# Patient Record
Sex: MALE | Race: WHITE | Employment: OTHER | URBAN - METROPOLITAN AREA
[De-identification: names, ages, dates, MRNs, and addresses within clinical notes are randomized per-mention and may not be internally consistent; named-entity substitution may affect disease eponyms.]

---

## 2017-01-12 ENCOUNTER — TRANSFERRED RECORDS (OUTPATIENT)
Dept: HEALTH INFORMATION MANAGEMENT | Facility: CLINIC | Age: 67
End: 2017-01-12

## 2017-01-18 DIAGNOSIS — Z01.818 PREOPERATIVE EXAMINATION: Primary | ICD-10-CM

## 2017-02-01 NOTE — PATIENT INSTRUCTIONS
Before Your Surgery  1. Hold ibuprofen, krill oil, aspirin until after surgery.  2. Take all your usual medications the day of surgery with a small sip of water except your metformin. Your metformin can be resumed in the evening after surgery.         Call your surgeon if there is any change in your health. This includes signs of a cold or flu (such as a sore throat, runny nose, cough, rash or fever).    Do not smoke, drink alcohol or take over the counter medicine (unless your surgeon or primary care doctor tells you to) for the 24 hours before and after surgery.    If you take prescribed drugs: Follow your doctor s orders about which medicines to take and which to stop until after surgery.    Eating and drinking prior to surgery: follow the instructions from your surgeon    Take a shower or bath the night before surgery. Use the soap your surgeon gave you to gently clean your skin. If you do not have soap from your surgeon, use your regular soap. Do not shave or scrub the surgery site.  Wear clean pajamas and have clean sheets on your bed.

## 2017-02-01 NOTE — PROGRESS NOTES
[facility]  [address]  [place] [address]  [phone]  Dept: [phone]    PRE-OP EVALUATION:  Today's date: 2017    Dino Daniels (: 1950) presents for pre-operative evaluation assessment as requested by Dr. Zimmerman.  He requires evaluation and anesthesia risk assessment prior to undergoing surgery/procedure for treatment of chronic ankle pain.  Proposed procedure:  Ankle fusion    Date of Surgery/ Procedure: 2/10/17  Time of Surgery/ Procedure: 09:30  Hospital/Surgical Facility: [facility]  Fax number for surgical facility: [phone]  Primary Physician: Abel Fernandez  Type of Anesthesia Anticipated: to be determined    Patient has a Health Care Directive or Living Will:  YES will bring it.     1. NO - Do you have a history of heart attack, stroke, stent, bypass or surgery on an artery in the head, neck, heart or legs?  2. NO - Do you ever have any pain or discomfort in your chest?  3. NO - Do you have a history of  Heart Failure?  4. NO - Are you troubled by shortness of breath when: walking on the level, up a slight hill or at night?  5. NO - Do you currently have a cold, bronchitis or other respiratory infection?  6. NO - Do you have a cough, shortness of breath or wheezing?  7. NO - Do you sometimes get pains in the calves of your legs when you walk?  8. NO - Do you or anyone in your family have previous history of blood clots?  9. NO - Do you or does anyone in your family have a serious bleeding problem such as prolonged bleeding following surgeries or cuts?  10. NO - Have you ever had problems with anemia or been told to take iron pills?  11. NO - Have you had any abnormal blood loss such as black, tarry or bloody stools, or abnormal vaginal bleeding?  12. NO - Have you ever had a blood transfusion?  13. NO - Have you or any of your relatives ever had problems with anesthesia?  14. NO - Do you have sleep apnea, excessive  snoring or daytime drowsiness?  15. NO - Do you have any prosthetic heart valves?  16. NO - Do you have prosthetic joints?  17. NO - Is there any chance that you may be pregnant?      HPI:                                                      Brief HPI related to upcoming procedure: Mr. Daniels is a 66 year old relatively healthy male who has had repeated ankle injuries throughout his life due to sports in his youth. He has been living with chronic pain in the left ankle for at least the past 10 years. It has become bad enough that he now desires left ankle fusion. He plans to undergo surgery on 2/10/17.     See problem list for active medical problems.  Problems all longstanding and stable, except as noted/documented.  See ROS for pertinent symptoms related to these conditions.                                                                                            MEDICAL HISTORY:                                                      Patient Active Problem List    Diagnosis Date Noted     Major depressive disorder, single episode in full remission (H) 12/03/2015     Priority: Medium     Type 2 diabetes, HbA1c goal < 7% (H) 07/16/2012     Priority: Medium     HYPERLIPIDEMIA LDL GOAL <100 10/31/2010     Priority: Medium     Pain in joint, ankle and foot 10/26/2007     Priority: Medium      Past Medical History   Diagnosis Date     Other and unspecified hyperlipidemia      abstracted 7/17/02.     DEPRESSIVE DISORDER NEC 10/9/2006     resolved in 2009     Type II or unspecified type diabetes mellitus without mention of complication, not stated as uncontrolled      Hypertension      Arthritis      knees, left ankle and right thumb     Bladder stone      Past Surgical History   Procedure Laterality Date     [place] teeth[       Vasectomy       Laser holmium lithotripsy bladder  6/22/2012     Procedure: LASER HOLMIUM LITHOTRIPSY BLADDER;  Cystoscopy with Removal of Bladder Stone with Holmium Laser;  Surgeon: Reza  Chaz Saunders MD;  Location: [facility] OR     Current Outpatient Prescriptions   Medication Sig Dispense Refill     citalopram (CELEXA) 20 MG tablet Take 1 tablet (20 mg) by mouth daily 90 tablet 3     citalopram (CELEXA) 10 MG tablet Take 1 tablet (10 mg) by mouth daily 90 tablet 3     blood glucose monitoring (NO BRAND SPECIFIED) test strip Pt. Is testing 3 times daily ### DO NOT FILL NOW.  Please update patient's profile to reflect additional refills.  #### 300 each 3     lisinopril (PRINIVIL/ZESTRIL) 20 MG tablet Take 1 tablet (20 mg) by mouth daily 90 tablet 3     atorvastatin (LIPITOR) 40 MG tablet Take 1 tablet (40 mg) by mouth daily 90 tablet 3     tamsulosin (FLOMAX) 0.4 MG capsule Take 1 capsule (0.4 mg) by mouth daily 90 capsule 3     finasteride (PROSCAR) 5 MG tablet Take 1 tablet (5 mg) by mouth daily 90 tablet 3     metFORMIN (GLUCOPHAGE) 1000 MG tablet one po bid 180 tablet 3     thin (NO BRAND SPECIFIED) lancets 1 each 3 times daily 3 Box 3     zolpidem (AMBIEN) 10 MG tablet Take 1 tablet (10 mg) by mouth nightly as needed for sleep 10 tablet 5     ASPIRIN PO Take 325 mg by mouth daily       Blood Glucose Monitoring Suppl (ASCENSIA BREEZE MONITOR) KIT 1 each daily. 1 kit 0     KRILL OIL OR None Entered       MULTIPLE VITAMINS CAPS   OR 1 capsule qd        OTC products: NSAIDS    Allergies   Allergen Reactions     No Known Drug Allergies       Latex Allergy: NO    Social History   Substance Use Topics     Smoking status: Never Smoker      Smokeless tobacco: Never Used     Alcohol Use: 0.0 oz/week     0 Standard drinks or equivalent per week      Comment: rarely     History   Drug Use No       REVIEW OF SYSTEMS:                                                    C: NEGATIVE for fever, chills, change in weight  I: NEGATIVE for worrisome rashes, moles or lesions  E: NEGATIVE for vision changes or irritation  E/M: NEGATIVE for ear, mouth and throat problems  R: NEGATIVE for significant cough or SOB  CV:  NEGATIVE for chest pain, palpitations or peripheral edema  GI: NEGATIVE for nausea, abdominal pain, heartburn, or change in bowel habits  : NEGATIVE for frequency, dysuria, or hematuria  M: NEGATIVE for significant arthralgias or myalgia other than left ankle.   N: NEGATIVE for weakness, dizziness or paresthesias  E: NEGATIVE for temperature intolerance, skin/hair changes  H: NEGATIVE for bleeding problems  P: NEGATIVE for changes in mood or affect    EXAM:                                                    /87 mmHg  Pulse 82  Temp(Src) 98.5  F (36.9  C)  Resp 16  Wt 231 lb (104.781 kg)  SpO2 99%    GENERAL APPEARANCE: healthy, alert and no distress     EYES: EOMI, - PERRL     HENT: ear canals and TM's normal and nose and mouth without ulcers or lesions     NECK: no adenopathy, no asymmetry, masses, or scars. No carotid bruits.      RESP: lungs clear to auscultation - no rales, rhonchi or wheezes     CV: regular rates and rhythm, normal S1 S2, no S3 or S4 and no murmur, click or rub.      ABDOMEN:  soft, nontender, no HSM or masses and bowel sounds normal     MS: extremities normal with exception of minor left ankle swelling.      SKIN: no suspicious lesions or rashes     NEURO: Normal strength and tone, sensory exam grossly normal, mentation intact and speech normal     PSYCH: mentation appears normal. and affect normal/bright     LYMPHATICS: No axillary, cervical, inguinal, or supraclavicular nodes    DIAGNOSTICS:                                                    EKG 2/6/17: appears normal, NSR, normal axis, normal intervals, no acute ST/T changes c/w ischemia, no LVH by voltage criteria, unchanged from previous tracings    Recent Labs   Lab Test  12/14/16   0831  09/21/16   0827   HGB  15.2  16.0   PLT  186  168   NA  140  136   POTASSIUM  4.5  4.4   CR  0.97  1.07   A1C  6.2*  7.2*   Labs 2/6/17:  Hgb 14.8  INR 1.00  PTT 27.0    IMPRESSION:                                                    Reason  for surgery/procedure: Chronic ankle pain, arthritis  Diagnosis/reason for consult: pre-operative clearance    The proposed surgical procedure is considered INTERMEDIATE risk.    REVISED CARDIAC RISK INDEX  The patient has the following serious cardiovascular risks for perioperative complications such as (MI, PE, VFib and 3  AV Block):  No serious cardiac risks  INTERPRETATION: 0 risks: Class I (very low risk - 0.4% complication rate)    The patient has the following additional risks for perioperative complications:  No identified additional risks    RECOMMENDATIONS:                                                      --Patient is to take all scheduled medications on the day of surgery EXCEPT for modifications listed below.    Diabetes Medication Use  -----Hold usual oral diabetic meds (e.g. Metformin, Actos, Glipizide) while NPO.     Anticoagulant or Antiplatelet Medication Use  ASPIRIN: Discontinue ASA 7-10 days prior to procedure to reduce bleeding risk.  It should be resumed post-operatively. (states he last took this around 2/1/17)  NSAIDS: Ibuprofen (Motrin): Hold starting now  Krill oil:   Hold starting now      APPROVAL GIVEN to proceed with proposed procedure, without further diagnostic evaluation       Signed Electronically by: Lawanda Tamayo PA-C    Copy of this evaluation report is provided to requesting physician.    [place] Preop Guidelines

## 2017-02-06 ENCOUNTER — HOSPITAL ENCOUNTER (OUTPATIENT)
Dept: LAB | Facility: CLINIC | Age: 67
Discharge: HOME | End: 2017-02-06
Attending: INTERNAL MEDICINE | Admitting: INTERNAL MEDICINE
Payer: MEDICARE

## 2017-02-06 ENCOUNTER — OFFICE VISIT (OUTPATIENT)
Dept: OTHER | Facility: CLINIC | Age: 67
End: 2017-02-06
Attending: INTERNAL MEDICINE
Payer: MEDICARE

## 2017-02-06 VITALS
DIASTOLIC BLOOD PRESSURE: 87 MMHG | BODY MASS INDEX: 33.86 KG/M2 | WEIGHT: 231 LBS | OXYGEN SATURATION: 99 % | TEMPERATURE: 98.5 F | SYSTOLIC BLOOD PRESSURE: 129 MMHG | HEART RATE: 82 BPM | RESPIRATION RATE: 16 BRPM

## 2017-02-06 DIAGNOSIS — Z01.818 PREOP GENERAL PHYSICAL EXAM: Primary | ICD-10-CM

## 2017-02-06 DIAGNOSIS — Z01.818 PREOPERATIVE EXAMINATION: ICD-10-CM

## 2017-02-06 DIAGNOSIS — Z01.810 PRE-OPERATIVE CARDIOVASCULAR EXAMINATION: Primary | ICD-10-CM

## 2017-02-06 LAB
APTT PPP: 27 SEC (ref 22–37)
HGB BLD-MCNC: 14.8 G/DL (ref 13.3–17.7)
INR PPP: 1 (ref 0.86–1.14)

## 2017-02-06 PROCEDURE — 36415 COLL VENOUS BLD VENIPUNCTURE: CPT | Performed by: PHYSICIAN ASSISTANT

## 2017-02-06 PROCEDURE — 99212 OFFICE O/P EST SF 10 MIN: CPT | Mod: 25

## 2017-02-06 PROCEDURE — 85018 HEMOGLOBIN: CPT | Performed by: PHYSICIAN ASSISTANT

## 2017-02-06 PROCEDURE — 99211 OFF/OP EST MAY X REQ PHY/QHP: CPT

## 2017-02-06 PROCEDURE — 99214 OFFICE O/P EST MOD 30 MIN: CPT | Mod: ZP | Performed by: PHYSICIAN ASSISTANT

## 2017-02-06 PROCEDURE — 85610 PROTHROMBIN TIME: CPT | Performed by: PHYSICIAN ASSISTANT

## 2017-02-06 PROCEDURE — 85730 THROMBOPLASTIN TIME PARTIAL: CPT | Performed by: PHYSICIAN ASSISTANT

## 2017-02-06 NOTE — NURSING NOTE
"Chief Complaint   Patient presents with     RECHECK     Pre-op for ankle fusion on 02/10/17. Pt will have labs done prior to appt.       Initial /87 mmHg  Pulse 82  Temp(Src) 98.5  F (36.9  C)  Resp 16  Wt 231 lb (104.781 kg)  SpO2 99% Estimated body mass index is 33.86 kg/(m^2) as calculated from the following:    Height as of 11/13/15: 5' 9.25\" (1.759 m).    Weight as of this encounter: 231 lb (104.781 kg).  Medication Reconciliation: complete   Blood pressure - regular cuff, right arm, sitting.     Face to face nursing time: 8 minutes    Emilee Dupont MA        "

## 2017-02-06 NOTE — MR AVS SNAPSHOT
After Visit Summary   2/6/2017    Dino Daniels    MRN: [id number]           Patient Information     Date Of Birth          1950        Visit Information        Provider Department      2/6/2017 11:15 AM Lawanda Tamayo PA-C [facility]      Today's Diagnoses     Preop general physical exam    -  1       Care Instructions      Before Your Surgery  1. Hold ibuprofen, krill oil, aspirin until after surgery.  2. Take all your usual medications the day of surgery with a small sip of water except your metformin. Your metformin can be resumed in the evening after surgery.         Call your surgeon if there is any change in your health. This includes signs of a cold or flu (such as a sore throat, runny nose, cough, rash or fever).    Do not smoke, drink alcohol or take over the counter medicine (unless your surgeon or primary care doctor tells you to) for the 24 hours before and after surgery.    If you take prescribed drugs: Follow your doctor s orders about which medicines to take and which to stop until after surgery.    Eating and drinking prior to surgery: follow the instructions from your surgeon    Take a shower or bath the night before surgery. Use the soap your surgeon gave you to gently clean your skin. If you do not have soap from your surgeon, use your regular soap. Do not shave or scrub the surgery site.  Wear clean pajamas and have clean sheets on your bed.         Follow-ups after your visit        Who to contact     If you have questions or need follow up information about today's clinic visit or your schedule please contact [facility] directly at [phone].  Normal or non-critical lab and imaging results will be communicated to you by MyChart, letter or phone within 4 business days after the clinic has received the results. If you do not hear from us within 7 days, please contact the  clinic through [employer] or phone. If you have a critical or abnormal lab result, we will notify you by phone as soon as possible.  Submit refill requests through [employer] or call your pharmacy and they will forward the refill request to us. Please allow 3 business days for your refill to be completed.          Additional Information About Your Visit        [employer] Information     [employer] gives you secure access to your electronic health record. If you see a primary care provider, you can also send messages to your care team and make appointments. If you have questions, please call your primary care clinic.  If you do not have a primary care provider, please call [phone] and they will assist you.        Care EveryWhere ID     This is your Care EveryWhere ID. This could be used by other organizations to access your [place] medical records  [id number]        Your Vitals Were     Pulse Temperature Respirations Pulse Oximetry          82 98.5  F (36.9  C) 16 99%         Blood Pressure from Last 3 Encounters:   02/06/17 129/87   12/20/16 119/78   09/28/16 122/78    Weight from Last 3 Encounters:   02/06/17 231 lb (104.781 kg)   12/20/16 225 lb 6.4 oz (102.241 kg)   09/28/16 223 lb (101.152 kg)              Today, you had the following     No orders found for display       Primary Care Provider Office Phone # Fax #    Abel Fernandez [phone]       [facility] [address]  [place] [address]        Thank you!     Thank you for choosing [facility] [place]  for your care. Our goal is always to provide you with excellent care. Hearing back from our patients is one way we can continue to improve our services. Please take a few minutes to complete the written survey that you may receive in the mail after your visit with us. Thank you!             Your Updated Medication List - Protect others around you: Learn how to safely use, store and throw away your medicines  at www.disposemymeds.org.          This list is accurate as of: 2/6/17 11:56 AM.  Always use your most recent med list.                   Brand Name Dispense Instructions for use    ASCENSIA BREEZE MONITOR Kit     1 kit    1 each daily.       ASPIRIN PO      Take 325 mg by mouth daily       atorvastatin 40 MG tablet    LIPITOR    90 tablet    Take 1 tablet (40 mg) by mouth daily       blood glucose monitoring test strip    no brand specified    300 each    Pt. Is testing 3 times daily ### DO NOT FILL NOW.  Please update patient's profile to reflect additional refills.  ####       * citalopram 20 MG tablet    celeXA    90 tablet    Take 1 tablet (20 mg) by mouth daily       * citalopram 10 MG tablet    celeXA    90 tablet    Take 1 tablet (10 mg) by mouth daily       finasteride 5 MG tablet    PROSCAR    90 tablet    Take 1 tablet (5 mg) by mouth daily       KRILL OIL PO      None Entered       lisinopril 20 MG tablet    PRINIVIL/ZESTRIL    90 tablet    Take 1 tablet (20 mg) by mouth daily       metFORMIN 1000 MG tablet    GLUCOPHAGE    180 tablet    one po bid       MULTIPLE VITAMINS CAPS   OR          1 capsule qd       tamsulosin 0.4 MG capsule    FLOMAX    90 capsule    Take 1 capsule (0.4 mg) by mouth daily       thin lancets    NO BRAND SPECIFIED    3 Box    1 each 3 times daily       zolpidem 10 MG tablet    AMBIEN    10 tablet    Take 1 tablet (10 mg) by mouth nightly as needed for sleep       * Notice:  This list has 2 medication(s) that are the same as other medications prescribed for you. Read the directions carefully, and ask your doctor or other care provider to review them with you.

## 2017-05-11 DIAGNOSIS — F51.01 PRIMARY INSOMNIA: ICD-10-CM

## 2017-05-11 RX ORDER — ZOLPIDEM TARTRATE 10 MG/1
10 TABLET ORAL
Qty: 10 TABLET | Refills: 5 | Status: CANCELLED | OUTPATIENT
Start: 2017-05-11

## 2017-05-11 RX ORDER — DOXEPIN 6 MG/1
6 TABLET, FILM COATED ORAL AT BEDTIME
Qty: 90 TABLET | Refills: 3 | Status: SHIPPED | OUTPATIENT
Start: 2017-05-11 | End: 2017-07-18

## 2017-05-15 DIAGNOSIS — F51.11 PRIMARY HYPERSOMNIA: Primary | ICD-10-CM

## 2017-05-15 RX ORDER — ZOLPIDEM TARTRATE 10 MG/1
10 TABLET ORAL
Qty: 10 TABLET | Refills: 5 | Status: SHIPPED | OUTPATIENT
Start: 2017-05-15 | End: 2017-12-21

## 2017-05-15 NOTE — TELEPHONE ENCOUNTER
How much does generic Ambien cost him??? While insurance won't pay for it, it is likely much cheaper than doxepin.

## 2017-05-15 NOTE — TELEPHONE ENCOUNTER
Medication is 560$ with insurance (doxepin).  Is there something else that the patient can try.  Please advise  Marissa Ellis RN, BSN

## 2017-05-25 ENCOUNTER — THERAPY VISIT (OUTPATIENT)
Dept: PHYSICAL THERAPY | Facility: CLINIC | Age: 67
End: 2017-05-25
Payer: MEDICARE

## 2017-05-25 DIAGNOSIS — M25.572 ACUTE LEFT ANKLE PAIN: ICD-10-CM

## 2017-05-25 DIAGNOSIS — Z98.890 STATUS POST SURGERY: ICD-10-CM

## 2017-05-25 PROCEDURE — G8979 MOBILITY GOAL STATUS: HCPCS | Mod: GP | Performed by: PHYSICAL THERAPIST

## 2017-05-25 PROCEDURE — 97161 PT EVAL LOW COMPLEX 20 MIN: CPT | Mod: GP | Performed by: PHYSICAL THERAPIST

## 2017-05-25 PROCEDURE — 97110 THERAPEUTIC EXERCISES: CPT | Mod: GP | Performed by: PHYSICAL THERAPIST

## 2017-05-25 PROCEDURE — G8978 MOBILITY CURRENT STATUS: HCPCS | Mod: GP | Performed by: PHYSICAL THERAPIST

## 2017-05-25 NOTE — LETTER
DEPARTMENT OF HEALTH AND HUMAN SERVICES  CENTERS FOR MEDICARE & MEDICAID SERVICES    PLAN/UPDATED PLAN OF PROGRESS FOR OUTPATIENT REHABILITATION    PATIENTS NAME:  Dino Daniels   : 1950  PROVIDER NUMBER:    [id number]  [id number]   PROVIDER NAME: [place] [facility]  MEDICAL RECORD NUMBER: [id number]   START OF CARE DATE:  17   TYPE:  PT  PRIMARY/TREATMENT DIAGNOSIS: Status post surgery; Acute left ankle pain  VISITS FROM START OF CARE:  Rxs Used: 1     Subjective:    Patient is a 66 year old male presenting with rehab left ankle/foot hpi. The history is provided by the patient. No  was used.   Dino Daniels is a 66 year old male with a left ankle condition.  Condition occurred with:  Degenerative joint disease.  Condition occurred: in the community.    PT reports having left ankle fusion on 2/15/17 without complications.  Patient reports pain:  Joint.  Radiates to:  Ankle and foot.  Pain is described as aching and is intermittent and reported as 1/10.  Associated symptoms:  Loss of motion/stiffness and loss of strength. Pain is worse during the day.  Symptoms are exacerbated by activity, weight bearing, standing and walking and relieved by rest.  Since onset symptoms are gradually improving.    Previous treatment includes surgery.  There was moderate improvement following previous treatment.  General health as reported by patient is good.  Pertinent medical history includes:  Osteoarthritis, overweight, diabetes and high blood pressure.  Other surgeries include:  Orthopedic surgery.    Current occupation is psychologist.  Patient is working in normal job without restrictions.  Primary job tasks include:  Prolonged standing.  Barriers include:  None as reported by patient.  Red flags:  None as reported by patient.      Objective:    Standing Alignment:    General Deviations:  Toe out L  Gait:    Gait Type:  Antalgic    Assistive Devices:  CAM    Assessment/Plan:      Patient is a 66 year old male with left side ankle complaints.    Patient has the following significant findings with corresponding treatment plan.                Diagnosis 1:  S/p L ankle fusion  Pain -  self management, education, directional preference exercise and home program  Decreased strength - therapeutic exercise and therapeutic activities  Impaired gait - gait training  Impaired muscle performance - neuro re-education  Decreased function - therapeutic activities          PATIENTS NAME:  Dino Daniels   : 1950          Therapy Evaluation Codes:   1) History comprised of:   Personal factors that impact the plan of care:      None.    Comorbidity factors that impact the plan of care are:      Diabetes, High blood pressure, Osteoarthritis and Overweight.     Medications impacting care: High blood pressure.  2) Examination of Body Systems comprised of:   Body structures and functions that impact the plan of care:      Ankle.   Activity limitations that impact the plan of care are:      Standing and Walking.  3) Clinical presentation characteristics are:   Stable/Uncomplicated.  4) Decision-Making    Low complexity using standardized patient assessment instrument and/or measureable assessment of functional outcome.  Cumulative Therapy Evaluation is: Low complexity.    Previous and current functional limitations:  (See Goal Flow Sheet for this information)    Short term and Long term goals: (See Goal Flow Sheet for this information)   Communication ability:  Patient appears to be able to clearly communicate and understand verbal and written communication and follow directions correctly.  Treatment Explanation - The following has been discussed with the patient:   RX ordered/plan of care  This patient would benefit from PT intervention to resume normal activities.   Rehab potential is good.    Frequency:  1 X week, once daily  Duration:  for 6  "weeks  Discharge Plan:  Achieve all LTG.  Independent in home treatment program.  Reach maximal therapeutic benefit.    Caregiver Signature/Credentials _____________________________ Date ________       Treating Provider:  Thomas Arreola, PT  I have reviewed and certified the need for these services and plan of treatment while under my care.  PHYSICIAN'S SIGNATURE:   _________________________________________  Date___________   Tyson Zimmerman MD    Certification period:  Beginning of Cert date period: 05/25/17 to  End of Cert period date: 08/22/17     Functional Level Progress Report: Please see attached \"Goal Flow sheet for Functional level.\"    ____X____ Continue Services or       ________ DC Services                Service dates: From  SOC Date: 05/25/17 date to present            "

## 2017-05-25 NOTE — MR AVS SNAPSHOT
After Visit Summary   5/25/2017    Dino Daniels    MRN: [id number]           Patient Information     Date Of Birth          1950        Visit Information        Provider Department      5/25/2017 1:50 PM Thomas Arreola PT [facility] Athletic [facility] Physical [facility]        Today's Diagnoses     Status post surgery        Acute left ankle pain           Follow-ups after your visit        Who to contact     If you have questions or need follow up information about today's clinic visit or your schedule please contact [facility] ATHLETIC [facility] PHYSICAL [facility] directly at [phone].  Normal or non-critical lab and imaging results will be communicated to you by TripLing[employer]art, letter or phone within 4 business days after the clinic has received the results. If you do not hear from us within 7 days, please contact the clinic through [email or web address] or phone. If you have a critical or abnormal lab result, we will notify you by phone as soon as possible.  Submit refill requests through [employer] or call your pharmacy and they will forward the refill request to us. Please allow 3 business days for your refill to be completed.          Additional Information About Your Visit        MyChart Information     Power[employer] gives you secure access to your electronic health record. If you see a primary care provider, you can also send messages to your care team and make appointments. If you have questions, please call your primary care clinic.  If you do not have a primary care provider, please call [phone] and they will assist you.        Care EveryWhere ID     This is your Care EveryWhere ID. This could be used by other organizations to access your [place] medical records  [id number]         Blood Pressure from Last 3 Encounters:   02/06/17 129/87   12/20/16 119/78   09/28/16 122/78    Weight from Last 3 Encounters:   02/06/17 104.8 kg (231 lb)   12/20/16 102.2 kg (225 lb 6.4  oz)   09/28/16 101.2 kg (223 lb)              We Performed the Following     HC PT EVAL, LOW COMPLEXITY     ZEESHAN CERT REPORT     ZEESHAN INITIAL EVAL REPORT     THERAPEUTIC EXERCISES        Primary Care Provider Office Phone # Fax #    Abel Fernandez [phone]       [facility] [address]  [place] [address]        Thank you!     Thank you for choosing [place] FOR ATHLETIC MEDICINE [facility] PHYSICAL THERAPY  for your care. Our goal is always to provide you with excellent care. Hearing back from our patients is one way we can continue to improve our services. Please take a few minutes to complete the written survey that you may receive in the mail after your visit with us. Thank you!             Your Updated Medication List - Protect others around you: Learn how to safely use, store and throw away your medicines at www.disposemymeds.org.          This list is accurate as of: 5/25/17  2:53 PM.  Always use your most recent med list.                   Brand Name Dispense Instructions for use    ASCENSIA BREEZE MONITOR Kit     1 kit    1 each daily.       ASPIRIN PO      Take 325 mg by mouth daily       atorvastatin 40 MG tablet    LIPITOR    90 tablet    Take 1 tablet (40 mg) by mouth daily       blood glucose monitoring test strip    no brand specified    300 each    Pt. Is testing 3 times daily ### DO NOT FILL NOW.  Please update patient's profile to reflect additional refills.  ####       * citalopram 20 MG tablet    celeXA    90 tablet    Take 1 tablet (20 mg) by mouth daily       * citalopram 10 MG tablet    celeXA    90 tablet    Take 1 tablet (10 mg) by mouth daily       doxepin 6 MG tablet    SILENOR    90 tablet    Take 1 tablet (6 mg) by mouth At Bedtime       finasteride 5 MG tablet    PROSCAR    90 tablet    Take 1 tablet (5 mg) by mouth daily       KRILL OIL PO      None Entered       lisinopril 20 MG tablet    PRINIVIL/ZESTRIL    90 tablet    Take 1 tablet  (20 mg) by mouth daily       metFORMIN 1000 MG tablet    GLUCOPHAGE    180 tablet    one po bid       MULTIPLE VITAMINS CAPS   OR          1 capsule qd       tamsulosin 0.4 MG capsule    FLOMAX    90 capsule    Take 1 capsule (0.4 mg) by mouth daily       thin lancets    NO BRAND SPECIFIED    3 Box    1 each 3 times daily       zolpidem 10 MG tablet    AMBIEN    10 tablet    Take 1 tablet (10 mg) by mouth nightly as needed for sleep       * Notice:  This list has 2 medication(s) that are the same as other medications prescribed for you. Read the directions carefully, and ask your doctor or other care provider to review them with you.

## 2017-05-25 NOTE — PROGRESS NOTES
Subjective:    Patient is a 66 year old male presenting with rehab left ankle/foot hpi. The history is provided by the patient. No  was used.   Dino Daniels is a 66 year old male with a left ankle condition.  Condition occurred with:  Degenerative joint disease.  Condition occurred: in the community.    PT reports having left ankle fusion on 2/15/17 without complications.  .    Patient reports pain:  Joint.  Radiates to:  Ankle and foot.  Pain is described as aching and is intermittent and reported as 1/10.  Associated symptoms:  Loss of motion/stiffness and loss of strength. Pain is worse during the day.  Symptoms are exacerbated by activity, weight bearing, standing and walking and relieved by rest.  Since onset symptoms are gradually improving.    Previous treatment includes surgery.  There was moderate improvement following previous treatment.  General health as reported by patient is good.  Pertinent medical history includes:  Osteoarthritis, overweight, diabetes and high blood pressure.    Other surgeries include:  Orthopedic surgery.    Current occupation is psychologist.  Patient is working in normal job without restrictions.  Primary job tasks include:  Prolonged standing.    Barriers include:  None as reported by patient.    Red flags:  None as reported by patient.                        Objective:    Standing Alignment:                  General Deviations:  Toe out L  Gait:    Gait Type:  Antalgic   Assistive Devices:  CAM            Physical Exam    General     ROS    Assessment/Plan:      Patient is a 66 year old male with left side ankle complaints.    Patient has the following significant findings with corresponding treatment plan.                Diagnosis 1:  S/p L ankle fusion  Pain -  self management, education, directional preference exercise and home program  Decreased strength - therapeutic exercise and therapeutic activities  Impaired gait - gait training  Impaired  muscle performance - neuro re-education  Decreased function - therapeutic activities    Therapy Evaluation Codes:   1) History comprised of:   Personal factors that impact the plan of care:      None.    Comorbidity factors that impact the plan of care are:      Diabetes, High blood pressure, Osteoarthritis and Overweight.     Medications impacting care: High blood pressure.  2) Examination of Body Systems comprised of:   Body structures and functions that impact the plan of care:      Ankle.   Activity limitations that impact the plan of care are:      Standing and Walking.  3) Clinical presentation characteristics are:   Stable/Uncomplicated.  4) Decision-Making    Low complexity using standardized patient assessment instrument and/or measureable assessment of functional outcome.  Cumulative Therapy Evaluation is: Low complexity.    Previous and current functional limitations:  (See Goal Flow Sheet for this information)    Short term and Long term goals: (See Goal Flow Sheet for this information)     Communication ability:  Patient appears to be able to clearly communicate and understand verbal and written communication and follow directions correctly.  Treatment Explanation - The following has been discussed with the patient:   RX ordered/plan of care  Anticipated outcomes  Possible risks and side effects  This patient would benefit from PT intervention to resume normal activities.   Rehab potential is good.    Frequency:  1 X week, once daily  Duration:  for 6 weeks  Discharge Plan:  Achieve all LTG.  Independent in home treatment program.  Reach maximal therapeutic benefit.    Please refer to the daily flowsheet for treatment today, total treatment time and time spent performing 1:1 timed codes.

## 2017-06-06 ENCOUNTER — THERAPY VISIT (OUTPATIENT)
Dept: PHYSICAL THERAPY | Facility: CLINIC | Age: 67
End: 2017-06-06
Payer: MEDICARE

## 2017-06-06 DIAGNOSIS — Z98.890 STATUS POST SURGERY: ICD-10-CM

## 2017-06-06 DIAGNOSIS — M25.572 ACUTE LEFT ANKLE PAIN: ICD-10-CM

## 2017-06-06 PROCEDURE — 97110 THERAPEUTIC EXERCISES: CPT | Mod: GP | Performed by: PHYSICAL THERAPIST

## 2017-06-06 PROCEDURE — 97112 NEUROMUSCULAR REEDUCATION: CPT | Mod: GP | Performed by: PHYSICAL THERAPIST

## 2017-06-22 ENCOUNTER — THERAPY VISIT (OUTPATIENT)
Dept: PHYSICAL THERAPY | Facility: CLINIC | Age: 67
End: 2017-06-22
Payer: MEDICARE

## 2017-06-22 DIAGNOSIS — M25.572 ACUTE LEFT ANKLE PAIN: ICD-10-CM

## 2017-06-22 DIAGNOSIS — Z98.890 STATUS POST SURGERY: ICD-10-CM

## 2017-06-22 PROCEDURE — 97112 NEUROMUSCULAR REEDUCATION: CPT | Mod: GP | Performed by: PHYSICAL THERAPIST

## 2017-06-22 PROCEDURE — G8978 MOBILITY CURRENT STATUS: HCPCS | Mod: GP | Performed by: PHYSICAL THERAPIST

## 2017-06-22 PROCEDURE — G8979 MOBILITY GOAL STATUS: HCPCS | Mod: GP | Performed by: PHYSICAL THERAPIST

## 2017-06-22 PROCEDURE — G8980 MOBILITY D/C STATUS: HCPCS | Mod: GP | Performed by: PHYSICAL THERAPIST

## 2017-06-22 PROCEDURE — 97110 THERAPEUTIC EXERCISES: CPT | Mod: GP | Performed by: PHYSICAL THERAPIST

## 2017-06-22 NOTE — PROGRESS NOTES
Subjective:    HPI                    Objective:    System    Physical Exam    General     ROS    Assessment/Plan:      DISCHARGE REPORT    Progress reporting period is from eval to 6/22/17.       SUBJECTIVE  Subjective changes noted by patient:  .  Subjective: Better.  Less pain and increased endurance with walking.    Current pain level is  Current Pain level: 0/10.     Previous pain level was   Initial Pain level: 1/10.   Changes in function:  Yes (See Goal flowsheet attached for changes in current functional level)  Adverse reaction to treatment or activity: None    OBJECTIVE  Changes noted in objective findings:  Yes,   Objective: Improved gait mechanics with less toe out.     ASSESSMENT/PLAN  Updated problem list and treatment plan: Diagnosis 1:  s/p Ankle fusion  Decreased strength - therapeutic exercise and therapeutic activities  Impaired balance - neuro re-education and therapeutic activities  Decreased proprioception - neuro re-education and therapeutic activities  Impaired gait - gait training  Impaired muscle performance - neuro re-education  Decreased function - therapeutic activities  STG/LTGs have been met or progress has been made towards goals:  Yes (See Goal flow sheet completed today.)  Assessment of Progress: The patient's condition is improving.  The patient's condition has potential to improve.  The patient has met all of their long term goals.  Self Management Plans:  Patient has been instructed in a home treatment program.  Patient is independent in a home treatment program.  Patient  has been instructed in self management of symptoms.  I have re-evaluated this patient and find that the nature, scope, duration and intensity of the therapy is appropriate for the medical condition of the patient.       Recommendations:  This patient is ready to be discharged from therapy and continue their home treatment program.    Please refer to the daily flowsheet for treatment today, total treatment time  and time spent performing 1:1 timed codes.

## 2017-06-22 NOTE — MR AVS SNAPSHOT
After Visit Summary   6/22/2017    Dino Daniels    MRN: [id number]           Patient Information     Date Of Birth          1950        Visit Information        Provider Department      6/22/2017 2:20 PM Thomas Arreola PT [facility] Athletic [facility] Physical [facility]        Today's Diagnoses     Acute left ankle pain        Status post surgery           Follow-ups after your visit        Who to contact     If you have questions or need follow up information about today's clinic visit or your schedule please contact [facility] ATHLETIC [facility] PHYSICAL [facility] directly at [phone].  Normal or non-critical lab and imaging results will be communicated to you by [employer], letter or phone within 4 business days after the clinic has received the results. If you do not hear from us within 7 days, please contact the clinic through [employer] or phone. If you have a critical or abnormal lab result, we will notify you by phone as soon as possible.  Submit refill requests through [employer] or call your pharmacy and they will forward the refill request to us. Please allow 3 business days for your refill to be completed.          Additional Information About Your Visit        MyChart Information     [employer] gives you secure access to your electronic health record. If you see a primary care provider, you can also send messages to your care team and make appointments. If you have questions, please call your primary care clinic.  If you do not have a primary care provider, please call [phone] and they will assist you.        Care EveryWhere ID     This is your Care EveryWhere ID. This could be used by other organizations to access your [place] medical records  [id number]         Blood Pressure from Last 3 Encounters:   02/06/17 129/87   12/20/16 119/78   09/28/16 122/78    Weight from Last 3 Encounters:   02/06/17 104.8 kg (231 lb)   12/20/16 102.2 kg (225 lb 6.4  oz)   09/28/16 101.2 kg (223 lb)              We Performed the Following     NEUROMUSCULAR RE-EDUCATION     THERAPEUTIC EXERCISES        Primary Care Provider Office Phone # Fax #    Abel Fernandez [phone]       [facility] [address]  [place] [address]        Equal Access to Services     [facility] : Hadii aad ku hadasho Soomaali, waaxda luqadaha, qaybta kaalmada adeegyada, waxay idiin hayaan adeeg kharash la'aan . So [facility] [phone].    ATENCIÓN: Si habla español, tiene a hernández disposición servicios gratuitos de asistencia lingüística. Llame al [phone].    We comply with applicable federal civil rights laws and [place] laws. We do not discriminate on the basis of race, color, national origin, age, disability sex, sexual orientation or gender identity.            Thank you!     Thank you for choosing [place] FOR ATHLETIC MEDICINE [facility] PHYSICAL THERAPY  for your care. Our goal is always to provide you with excellent care. Hearing back from our patients is one way we can continue to improve our services. Please take a few minutes to complete the written survey that you may receive in the mail after your visit with us. Thank you!             Your Updated Medication List - Protect others around you: Learn how to safely use, store and throw away your medicines at www.disposemymeds.org.          This list is accurate as of: 6/22/17  3:02 PM.  Always use your most recent med list.                   Brand Name Dispense Instructions for use Diagnosis    ASCENSIA BREEZE MONITOR Kit     1 kit    1 each daily.    Impaired fasting glucose       ASPIRIN PO      Take 325 mg by mouth daily        atorvastatin 40 MG tablet    LIPITOR    90 tablet    Take 1 tablet (40 mg) by mouth daily    Hyperlipidemia LDL goal <100       blood glucose monitoring test strip    no brand specified    300 each    Pt. Is testing 3 times daily ### DO NOT FILL NOW.  Please update  patient's profile to reflect additional refills.  ####    Type 2 diabetes mellitus with hyperosmolarity without coma, without long-term current use of insulin (H)       * citalopram 20 MG tablet    celeXA    90 tablet    Take 1 tablet (20 mg) by mouth daily    Major depressive disorder, single episode in full remission (H)       * citalopram 10 MG tablet    celeXA    90 tablet    Take 1 tablet (10 mg) by mouth daily    Major depressive disorder, single episode in full remission (H)       doxepin 6 MG tablet    SILENOR    90 tablet    Take 1 tablet (6 mg) by mouth At Bedtime    Primary insomnia       finasteride 5 MG tablet    PROSCAR    90 tablet    Take 1 tablet (5 mg) by mouth daily    Urinary retention       KRILL OIL PO      None Entered        lisinopril 20 MG tablet    PRINIVIL/ZESTRIL    90 tablet    Take 1 tablet (20 mg) by mouth daily    Essential hypertension with goal blood pressure less than 130/80       metFORMIN 1000 MG tablet    GLUCOPHAGE    180 tablet    one po bid    Type 2 diabetes mellitus with hyperosmolarity without coma, without long-term current use of insulin (H)       MULTIPLE VITAMINS CAPS   OR          1 capsule qd        tamsulosin 0.4 MG capsule    FLOMAX    90 capsule    Take 1 capsule (0.4 mg) by mouth daily    Urinary retention       thin lancets    NO BRAND SPECIFIED    3 Box    1 each 3 times daily    Type 2 diabetes mellitus with hyperosmolarity without coma, without long-term current use of insulin (H)       zolpidem 10 MG tablet    AMBIEN    10 tablet    Take 1 tablet (10 mg) by mouth nightly as needed for sleep    Primary hypersomnia       * Notice:  This list has 2 medication(s) that are the same as other medications prescribed for you. Read the directions carefully, and ask your doctor or other care provider to review them with you.

## 2017-07-13 ENCOUNTER — MYC MEDICAL ADVICE (OUTPATIENT)
Dept: OTHER | Facility: CLINIC | Age: 67
End: 2017-07-13

## 2017-07-13 DIAGNOSIS — R30.0 DYSURIA: Primary | ICD-10-CM

## 2017-07-13 DIAGNOSIS — R35.0 URINARY FREQUENCY: ICD-10-CM

## 2017-07-18 ENCOUNTER — OFFICE VISIT (OUTPATIENT)
Dept: UROLOGY | Facility: CLINIC | Age: 67
End: 2017-07-18
Payer: COMMERCIAL

## 2017-07-18 VITALS
WEIGHT: 220 LBS | SYSTOLIC BLOOD PRESSURE: 110 MMHG | DIASTOLIC BLOOD PRESSURE: 72 MMHG | HEIGHT: 71 IN | HEART RATE: 80 BPM | BODY MASS INDEX: 30.8 KG/M2

## 2017-07-18 DIAGNOSIS — R30.0 DYSURIA: Primary | ICD-10-CM

## 2017-07-18 DIAGNOSIS — R35.0 URINARY FREQUENCY: ICD-10-CM

## 2017-07-18 DIAGNOSIS — R31.29 MICROSCOPIC HEMATURIA: Primary | ICD-10-CM

## 2017-07-18 DIAGNOSIS — R30.0 DYSURIA: ICD-10-CM

## 2017-07-18 LAB
ALBUMIN UR-MCNC: NEGATIVE MG/DL
APPEARANCE UR: ABNORMAL
BACTERIA #/AREA URNS HPF: ABNORMAL /HPF
BILIRUB UR QL STRIP: NEGATIVE
COLOR UR AUTO: YELLOW
GLUCOSE UR STRIP-MCNC: NEGATIVE MG/DL
HGB UR QL STRIP: ABNORMAL
KETONES UR STRIP-MCNC: NEGATIVE MG/DL
LEUKOCYTE ESTERASE UR QL STRIP: ABNORMAL
NITRATE UR QL: NEGATIVE
PH UR STRIP: 6.5 PH (ref 5–7)
RBC #/AREA URNS AUTO: 6 /HPF (ref 0–2)
SP GR UR STRIP: 1.01 (ref 1–1.03)
URN SPEC COLLECT METH UR: ABNORMAL
UROBILINOGEN UR STRIP-ACNC: 0.2 EU/DL (ref 0.2–1)
WBC #/AREA URNS AUTO: >182 /HPF (ref 0–2)

## 2017-07-18 PROCEDURE — 87186 SC STD MICRODIL/AGAR DIL: CPT | Performed by: PHYSICIAN ASSISTANT

## 2017-07-18 PROCEDURE — 87086 URINE CULTURE/COLONY COUNT: CPT | Performed by: PHYSICIAN ASSISTANT

## 2017-07-18 PROCEDURE — 99203 OFFICE O/P NEW LOW 30 MIN: CPT | Mod: 25 | Performed by: PHYSICIAN ASSISTANT

## 2017-07-18 PROCEDURE — 87088 URINE BACTERIA CULTURE: CPT | Performed by: PHYSICIAN ASSISTANT

## 2017-07-18 PROCEDURE — 51798 US URINE CAPACITY MEASURE: CPT | Performed by: PHYSICIAN ASSISTANT

## 2017-07-18 PROCEDURE — 81001 URINALYSIS AUTO W/SCOPE: CPT | Performed by: PHYSICIAN ASSISTANT

## 2017-07-18 RX ORDER — SULFAMETHOXAZOLE/TRIMETHOPRIM 800-160 MG
1 TABLET ORAL 2 TIMES DAILY
Qty: 14 TABLET | Refills: 0 | Status: SHIPPED | OUTPATIENT
Start: 2017-07-18 | End: 2017-12-21

## 2017-07-18 ASSESSMENT — PAIN SCALES - GENERAL: PAINLEVEL: NO PAIN (0)

## 2017-07-18 NOTE — MR AVS SNAPSHOT
After Visit Summary   7/18/2017    Dino Daniels    MRN: [id number]           Patient Information     Date Of Birth          1950        Visit Information        Provider Department      7/18/2017 2:00 PM Hattie Martinez PA-C [facility] [place]        Today's Diagnoses     Microscopic hematuria    -  1    Dysuria        Urinary frequency          Care Instructions    Please call [phone] to schedule CT Scan.          Follow-ups after your visit        Future tests that were ordered for you today     Open Future Orders        Priority Expected Expires Ordered    CT Abdomen Pelvis w/o Contrast ASAP  7/18/2018 7/18/2017            Who to contact     If you have questions or need follow up information about today's clinic visit or your schedule please contact [facility]OLOGY CLINIC [place] directly at [phone].  Normal or non-critical lab and imaging results will be communicated to you by [employer], letter or phone within 4 business days after the clinic has received the results. If you do not hear from us within 7 days, please contact the clinic through [employer] or phone. If you have a critical or abnormal lab result, we will notify you by phone as soon as possible.  Submit refill requests through [employer] or call your pharmacy and they will forward the refill request to us. Please allow 3 business days for your refill to be completed.          Additional Information About Your Visit        MyChart Information     [employer] gives you secure access to your electronic health record. If you see a primary care provider, you can also send messages to your care team and make appointments. If you have questions, please call your primary care clinic.  If you do not have a primary care provider, please call [phone] and they will assist you.        Care EveryWhere ID     This is your Care EveryWhere ID. This could be used by other  "organizations to access your [place] medical records  [id number]        Your Vitals Were     Pulse Height BMI (Body Mass Index)             80 1.803 m (5' 11\") 30.68 kg/m2          Blood Pressure from Last 3 Encounters:   07/18/17 110/72   02/06/17 129/87   12/20/16 119/78    Weight from Last 3 Encounters:   07/18/17 99.8 kg (220 lb)   02/06/17 104.8 kg (231 lb)   12/20/16 102.2 kg (225 lb 6.4 oz)              We Performed the Following     MEASURE POST-VOID RESIDUAL URINE/BLADDER CAPACITY, US NON-IMAGING     UA without Microscopic     Urine Culture Aerobic Bacterial     Urine Micro Urologic Phys        Primary Care Provider Office Phone # Fax #    Abel Fernandez [phone]       [facility] [address]  [facility] [address]        Equal Access to Services     MICHELLE GOLDMAN : Hadii aad ku hadasho Soomaali, waaxda luqadaha, qaybta kaalmada adeegyada, waxay idiin haygretan marina traore . So [facility] [phone].    ATENCIÓN: Si habla español, tiene a hernández disposición servicios gratuitos de asistencia lingüística. Lucas al [phone].    We comply with applicable federal civil rights laws and [place] laws. We do not discriminate on the basis of race, color, national origin, age, disability sex, sexual orientation or gender identity.            Thank you!     Thank you for choosing [facility] UROLOGY CLINIC [place]  for your care. Our goal is always to provide you with excellent care. Hearing back from our patients is one way we can continue to improve our services. Please take a few minutes to complete the written survey that you may receive in the mail after your visit with us. Thank you!             Your Updated Medication List - Protect others around you: Learn how to safely use, store and throw away your medicines at www.disposemymeds.org.          This list is accurate as of: 7/18/17  2:35 PM.  Always use your most recent med list.                   " Brand Name Dispense Instructions for use Diagnosis    ASCENSIA BREEZE MONITOR Kit     1 kit    1 each daily.    Impaired fasting glucose       ASPIRIN PO      Take 325 mg by mouth daily        atorvastatin 40 MG tablet    LIPITOR    90 tablet    Take 1 tablet (40 mg) by mouth daily    Hyperlipidemia LDL goal <100       blood glucose monitoring test strip    no brand specified    300 each    Pt. Is testing 3 times daily ### DO NOT FILL NOW.  Please update patient's profile to reflect additional refills.  ####    Type 2 diabetes mellitus with hyperosmolarity without coma, without long-term current use of insulin (H)       citalopram 20 MG tablet    celeXA    90 tablet    Take 1 tablet (20 mg) by mouth daily    Major depressive disorder, single episode in full remission (H)       finasteride 5 MG tablet    PROSCAR    90 tablet    Take 1 tablet (5 mg) by mouth daily    Urinary retention       KRILL OIL PO      None Entered        lisinopril 20 MG tablet    PRINIVIL/ZESTRIL    90 tablet    Take 1 tablet (20 mg) by mouth daily    Essential hypertension with goal blood pressure less than 130/80       metFORMIN 1000 MG tablet    GLUCOPHAGE    180 tablet    one po bid    Type 2 diabetes mellitus with hyperosmolarity without coma, without long-term current use of insulin (H)       MULTIPLE VITAMINS CAPS   OR          1 capsule qd        tamsulosin 0.4 MG capsule    FLOMAX    90 capsule    Take 1 capsule (0.4 mg) by mouth daily    Urinary retention       thin lancets    NO BRAND SPECIFIED    3 Box    1 each 3 times daily    Type 2 diabetes mellitus with hyperosmolarity without coma, without long-term current use of insulin (H)       zolpidem 10 MG tablet    AMBIEN    10 tablet    Take 1 tablet (10 mg) by mouth nightly as needed for sleep    Primary hypersomnia

## 2017-07-18 NOTE — NURSING NOTE
Chief Complaint   Patient presents with     Urinary Frequency     Patient said that he urinates every 1/2 hour to 1 1/2 hours throughout day and night.      Dysuria     Patient said that it burns when he urinates.      May Gustafson LPN 2:01 PM July 18, 2017

## 2017-07-18 NOTE — PROGRESS NOTES
[place] 2012 July 18, 2017    CC: Frequency, burning    HPI:  Dino Daniels is a 66 year old male who presents with 10 day history of urgency, frequency and burning.  He had previously seen Dr. Cobb in 2012 and has been on finasteride and Flomax since then for BPH (last cysto was in 2012 and Dr. Cobb had discussed TURP at that time).  He also has a history of kidney and bladder stones.  He underwent cystolitholapaxy and removal of bladder stone with holmium laser in May 2012.  Proximally three weeks ago, he had right-sided flank pain and thought he was passing a stone.  This pain resolved, but he has continued to have urgency, frequency and burning.  He complains of nocturia and he often double voids.  No fevers, chills.    Past Medical History:   Diagnosis Date     Arthritis     knees, left ankle and right thumb     Bladder stone      DEPRESSIVE DISORDER NEC 10/9/2006    resolved in 2009     Hypertension      Mumps      Other and unspecified hyperlipidemia     abstracted 7/17/02.     Type II or unspecified type diabetes mellitus without mention of complication, not stated as uncontrolled        Past Surgical History:   Procedure Laterality Date     LASER HOLMIUM LITHOTRIPSY BLADDER  6/22/2012    Procedure: LASER HOLMIUM LITHOTRIPSY BLADDER;  Cystoscopy with Removal of Bladder Stone with Holmium Laser;  Surgeon: Chaz Cobb MD;  Location: [facility]     TESTICLE SURGERY       VASECTOMY       VASECTOMY       wisdom teeth[         Social History     Social History     Marital status:      Spouse name: N/A     Number of children: N/A     Years of education: N/A     Occupational History     Not on file.     Social History Main Topics     Smoking status: Never Smoker     Smokeless tobacco: Never Used     Alcohol use 0.0 oz/week     0 Standard drinks or equivalent per week      Comment: rarely     Drug use: No     Sexual activity: Not on file     Other Topics Concern     Not on file     Social  "History Narrative       Family History   Problem Relation Age of Onset      Father       age 56 leukemia     CEREBROVASCULAR DISEASE Mother       Brother      b,1952      Sister      b,1954 back problems      Brother      b,1955 back problems       ROS:14 point ROS neg other than the symptoms noted above in the HPI.    Allergies   Allergen Reactions     No Known Drug Allergies        Current Outpatient Prescriptions   Medication     zolpidem (AMBIEN) 10 MG tablet     citalopram (CELEXA) 20 MG tablet     lisinopril (PRINIVIL/ZESTRIL) 20 MG tablet     atorvastatin (LIPITOR) 40 MG tablet     tamsulosin (FLOMAX) 0.4 MG capsule     finasteride (PROSCAR) 5 MG tablet     metFORMIN (GLUCOPHAGE) 1000 MG tablet     ASPIRIN PO     KRILL OIL OR     MULTIPLE VITAMINS CAPS   OR     blood glucose monitoring (NO BRAND SPECIFIED) test strip     thin (NO BRAND SPECIFIED) lancets     [DISCONTINUED] citalopram (CELEXA) 10 MG tablet     Blood Glucose Monitoring Suppl (OobafitEZE MONITOR) KIT     No current facility-administered medications for this visit.      PEx:   Blood pressure 110/72, pulse 80, height 1.803 m (5' 11\"), weight 99.8 kg (220 lb).  5' 11\", Body mass index is 30.68 kg/(m^2)., 220 lbs 0 oz  Gen appearance:  Well groomed,: age-appropriate appearing male in NAD.   HEENT:  EOMI, AT NC, CN grossly normal  Psych:  alert , comfortable in no acute distress  Neuro:  A/O X 3  Skin:  Warm to touch, clear of rashes, ecchymoses  Resp:  No increased respiratory effort  lymph:  No LE edema  Abd:  Soft/NT, ND, no palpable masses, no CVAT  Back: bony spine is non-tender, flanks are nontender    Urine:  Small blood, large leuk esterase    A/P: Dino Daniels is a 66 year old male with , urinary frequency, burning, history of stones and BPH.     UA, micro, culture.  CT abdomen and pelvis without  Follow-up with Dr. Cobb with possible cystoscopy.    Hattie Martinez PA-C  [facility]    30 minutes were spent with the " patient today, > 50% in counseling and coordination of care

## 2017-07-18 NOTE — LETTER
7/18/2017       RE: Dino Daniels  [id number] [facility] [address]     Dear Colleague,    Thank you for referring your patient, Dino Daniels, to the [facility] [place] at [facility]. Please see a copy of my visit note below.    Stone 2012 July 18, 2017    CC: Frequency, burning    HPI:  Dino Daniels is a 66 year old male who presents with 10 day history of urgency, frequency and burning.  He had previously seen Dr. Cobb in 2012 and has been on finasteride and Flomax since then for BPH (last cysto was in 2012 and Dr. Cobb had discussed TURP at that time).  He also has a history of kidney and bladder stones.  He underwent cystolitholapaxy and removal of bladder stone with holmium laser in May 2012.  Proximally three weeks ago, he had right-sided flank pain and thought he was passing a stone.  This pain resolved, but he has continued to have urgency, frequency and burning.  He complains of nocturia and he often double voids.  No fevers, chills.    Past Medical History:   Diagnosis Date     Arthritis     knees, left ankle and right thumb     Bladder stone      DEPRESSIVE DISORDER NEC 10/9/2006    resolved in 2009     Hypertension      Mumps      Other and unspecified hyperlipidemia     abstracted 7/17/02.     Type II or unspecified type diabetes mellitus without mention of complication, not stated as uncontrolled        Past Surgical History:   Procedure Laterality Date     LASER HOLMIUM LITHOTRIPSY BLADDER  6/22/2012    Procedure: LASER HOLMIUM LITHOTRIPSY BLADDER;  Cystoscopy with Removal of Bladder Stone with Holmium Laser;  Surgeon: Chaz Cobb MD;  Location: [facility]     TESTICLE SURGERY       VASECTOMY       VASECTOMY       wisdom teeth[         Social History     Social History     Marital status:      Spouse name: N/A     Number of children: N/A     Years of education: N/A     Occupational  "History     Not on file.     Social History Main Topics     Smoking status: Never Smoker     Smokeless tobacco: Never Used     Alcohol use 0.0 oz/week     0 Standard drinks or equivalent per week      Comment: rarely     Drug use: No     Sexual activity: Not on file     Other Topics Concern     Not on file     Social History Narrative       Family History   Problem Relation Age of Onset      Father       age 56 leukemia     CEREBROVASCULAR DISEASE Mother       Brother      b,1952      Sister      b,1954 back problems      Brother      b,1955 back problems       ROS:14 point ROS neg other than the symptoms noted above in the HPI.    Allergies   Allergen Reactions     No Known Drug Allergies        Current Outpatient Prescriptions   Medication     zolpidem (AMBIEN) 10 MG tablet     citalopram (CELEXA) 20 MG tablet     lisinopril (PRINIVIL/ZESTRIL) 20 MG tablet     atorvastatin (LIPITOR) 40 MG tablet     tamsulosin (FLOMAX) 0.4 MG capsule     finasteride (PROSCAR) 5 MG tablet     metFORMIN (GLUCOPHAGE) 1000 MG tablet     ASPIRIN PO     KRILL OIL OR     MULTIPLE VITAMINS CAPS   OR     blood glucose monitoring (NO BRAND SPECIFIED) test strip     thin (NO BRAND SPECIFIED) lancets     [DISCONTINUED] citalopram (CELEXA) 10 MG tablet     Blood Glucose Monitoring Suppl (LivestationEZE MONITOR) KIT     No current facility-administered medications for this visit.      PEx:   Blood pressure 110/72, pulse 80, height 1.803 m (5' 11\"), weight 99.8 kg (220 lb).  5' 11\", Body mass index is 30.68 kg/(m^2)., 220 lbs 0 oz  Gen appearance:  Well groomed,: age-appropriate appearing male in NAD.   HEENT:  EOMI, AT NC, CN grossly normal  Psych:  alert , comfortable in no acute distress  Neuro:  A/O X 3  Skin:  Warm to touch, clear of rashes, ecchymoses  Resp:  No increased respiratory effort  lymph:  No LE edema  Abd:  Soft/NT, ND, no palpable masses, no CVAT  Back: bony spine is non-tender, flanks are nontender    Urine:  Small " blood, large leuk esterase    A/P: Dino Daniels is a 66 year old male with , urinary frequency, burning, history of stones and BPH.     UA, micro, culture.  CT abdomen and pelvis without  Follow-up with Dr. Cobb with possible cystoscopy.    Hattie Martinez PA-C  [facility]    30 minutes were spent with the patient today, > 50% in counseling and coordination of care

## 2017-07-20 ENCOUNTER — HOSPITAL ENCOUNTER (OUTPATIENT)
Dept: CT IMAGING | Facility: CLINIC | Age: 67
Discharge: HOME | End: 2017-07-20
Attending: PHYSICIAN ASSISTANT | Admitting: PHYSICIAN ASSISTANT
Payer: MEDICARE

## 2017-07-20 DIAGNOSIS — R35.0 URINARY FREQUENCY: ICD-10-CM

## 2017-07-20 PROCEDURE — 74176 CT ABD & PELVIS W/O CONTRAST: CPT

## 2017-07-21 LAB
BACTERIA SPEC CULT: ABNORMAL
Lab: ABNORMAL
MICRO REPORT STATUS: ABNORMAL
MICROORGANISM SPEC CULT: ABNORMAL
SPECIMEN SOURCE: ABNORMAL

## 2017-12-07 DIAGNOSIS — F32.5 MAJOR DEPRESSIVE DISORDER, SINGLE EPISODE IN FULL REMISSION (H): ICD-10-CM

## 2017-12-07 DIAGNOSIS — Z00.00 ROUTINE GENERAL MEDICAL EXAMINATION AT A HEALTH CARE FACILITY: ICD-10-CM

## 2017-12-07 DIAGNOSIS — R33.9 URINARY RETENTION: ICD-10-CM

## 2017-12-07 DIAGNOSIS — E78.5 HYPERLIPIDEMIA LDL GOAL <100: ICD-10-CM

## 2017-12-07 DIAGNOSIS — E11.00 TYPE 2 DIABETES MELLITUS WITH HYPEROSMOLARITY WITHOUT COMA, WITHOUT LONG-TERM CURRENT USE OF INSULIN (H): ICD-10-CM

## 2017-12-07 LAB
ALBUMIN SERPL-MCNC: 4 G/DL (ref 3.4–5)
ALP SERPL-CCNC: 42 U/L (ref 40–150)
ALT SERPL W P-5'-P-CCNC: 67 U/L (ref 0–70)
ANION GAP SERPL CALCULATED.3IONS-SCNC: 11 MMOL/L (ref 3–14)
AST SERPL W P-5'-P-CCNC: 30 U/L (ref 0–45)
BASOPHILS # BLD AUTO: 0 10E9/L (ref 0–0.2)
BASOPHILS NFR BLD AUTO: 0.5 %
BILIRUB DIRECT SERPL-MCNC: 0.2 MG/DL (ref 0–0.2)
BILIRUB SERPL-MCNC: 0.6 MG/DL (ref 0.2–1.3)
BUN SERPL-MCNC: 22 MG/DL (ref 7–30)
CALCIUM SERPL-MCNC: 9.4 MG/DL (ref 8.5–10.1)
CHLORIDE SERPL-SCNC: 104 MMOL/L (ref 94–109)
CHOLEST SERPL-MCNC: 134 MG/DL
CO2 SERPL-SCNC: 24 MMOL/L (ref 20–32)
CREAT SERPL-MCNC: 0.97 MG/DL (ref 0.66–1.25)
DEPRECATED CALCIDIOL+CALCIFEROL SERPL-MC: 52 UG/L (ref 20–75)
DIFFERENTIAL METHOD BLD: NORMAL
EOSINOPHIL # BLD AUTO: 0.1 10E9/L (ref 0–0.7)
EOSINOPHIL NFR BLD AUTO: 2.2 %
ERYTHROCYTE [DISTWIDTH] IN BLOOD BY AUTOMATED COUNT: 14 % (ref 10–15)
GFR SERPL CREATININE-BSD FRML MDRD: 78 ML/MIN/1.7M2
GLUCOSE SERPL-MCNC: 154 MG/DL (ref 70–99)
HBA1C MFR BLD: 6.6 % (ref 4.3–6)
HCT VFR BLD AUTO: 43.6 % (ref 40–53)
HDLC SERPL-MCNC: 34 MG/DL
HGB BLD-MCNC: 15.2 G/DL (ref 13.3–17.7)
LDLC SERPL CALC-MCNC: 66 MG/DL
LYMPHOCYTES # BLD AUTO: 1.9 10E9/L (ref 0.8–5.3)
LYMPHOCYTES NFR BLD AUTO: 29.9 %
MCH RBC QN AUTO: 31 PG (ref 26.5–33)
MCHC RBC AUTO-ENTMCNC: 34.9 G/DL (ref 31.5–36.5)
MCV RBC AUTO: 89 FL (ref 78–100)
MONOCYTES # BLD AUTO: 0.5 10E9/L (ref 0–1.3)
MONOCYTES NFR BLD AUTO: 8.1 %
NEUTROPHILS # BLD AUTO: 3.7 10E9/L (ref 1.6–8.3)
NEUTROPHILS NFR BLD AUTO: 59.3 %
NONHDLC SERPL-MCNC: 100 MG/DL
PLATELET # BLD AUTO: 184 10E9/L (ref 150–450)
POTASSIUM SERPL-SCNC: 4.5 MMOL/L (ref 3.4–5.3)
PROT SERPL-MCNC: 7.6 G/DL (ref 6.8–8.8)
PSA SERPL-ACNC: 2.03 UG/L (ref 0–4)
RBC # BLD AUTO: 4.91 10E12/L (ref 4.4–5.9)
SODIUM SERPL-SCNC: 139 MMOL/L (ref 133–144)
T3FREE SERPL-MCNC: 2.6 PG/ML (ref 2.3–4.2)
T4 FREE SERPL-MCNC: 0.83 NG/DL (ref 0.76–1.46)
TRIGL SERPL-MCNC: 172 MG/DL
TSH SERPL DL<=0.005 MIU/L-ACNC: 2.14 MU/L (ref 0.4–4)
WBC # BLD AUTO: 6.3 10E9/L (ref 4–11)

## 2017-12-07 PROCEDURE — 85025 COMPLETE CBC W/AUTO DIFF WBC: CPT | Performed by: FAMILY MEDICINE

## 2017-12-07 PROCEDURE — 82306 VITAMIN D 25 HYDROXY: CPT | Performed by: FAMILY MEDICINE

## 2017-12-07 PROCEDURE — 84481 FREE ASSAY (FT-3): CPT | Performed by: FAMILY MEDICINE

## 2017-12-07 PROCEDURE — G0103 PSA SCREENING: HCPCS | Performed by: FAMILY MEDICINE

## 2017-12-07 PROCEDURE — 83036 HEMOGLOBIN GLYCOSYLATED A1C: CPT | Performed by: FAMILY MEDICINE

## 2017-12-07 PROCEDURE — 36415 COLL VENOUS BLD VENIPUNCTURE: CPT | Performed by: FAMILY MEDICINE

## 2017-12-07 PROCEDURE — 84439 ASSAY OF FREE THYROXINE: CPT | Performed by: FAMILY MEDICINE

## 2017-12-07 PROCEDURE — 80048 BASIC METABOLIC PNL TOTAL CA: CPT | Performed by: FAMILY MEDICINE

## 2017-12-07 PROCEDURE — 80076 HEPATIC FUNCTION PANEL: CPT | Performed by: FAMILY MEDICINE

## 2017-12-07 PROCEDURE — 84443 ASSAY THYROID STIM HORMONE: CPT | Performed by: FAMILY MEDICINE

## 2017-12-07 PROCEDURE — 80061 LIPID PANEL: CPT | Performed by: FAMILY MEDICINE

## 2017-12-15 DIAGNOSIS — E11.00 TYPE 2 DIABETES MELLITUS WITH HYPEROSMOLARITY WITHOUT COMA, WITHOUT LONG-TERM CURRENT USE OF INSULIN (H): ICD-10-CM

## 2017-12-15 NOTE — TELEPHONE ENCOUNTER
metFORMIN  More than six moths since LOV. UA to refill per RN protocol  Med and pharm loaded  Marissa Ellis, RN, BSN

## 2017-12-21 ENCOUNTER — OFFICE VISIT (OUTPATIENT)
Dept: OTHER | Facility: CLINIC | Age: 67
End: 2017-12-21
Attending: INTERNAL MEDICINE
Payer: COMMERCIAL

## 2017-12-21 VITALS
HEART RATE: 105 BPM | OXYGEN SATURATION: 95 % | SYSTOLIC BLOOD PRESSURE: 118 MMHG | DIASTOLIC BLOOD PRESSURE: 76 MMHG | WEIGHT: 231.6 LBS | HEIGHT: 71 IN | BODY MASS INDEX: 32.42 KG/M2

## 2017-12-21 DIAGNOSIS — Z12.5 SCREENING FOR PROSTATE CANCER: ICD-10-CM

## 2017-12-21 DIAGNOSIS — D36.9 TUBULAR ADENOMA: ICD-10-CM

## 2017-12-21 DIAGNOSIS — F51.11 PRIMARY HYPERSOMNIA: ICD-10-CM

## 2017-12-21 DIAGNOSIS — I10 ESSENTIAL HYPERTENSION WITH GOAL BLOOD PRESSURE LESS THAN 130/80: ICD-10-CM

## 2017-12-21 DIAGNOSIS — F51.01 PRIMARY INSOMNIA: ICD-10-CM

## 2017-12-21 DIAGNOSIS — R33.9 URINARY RETENTION: ICD-10-CM

## 2017-12-21 DIAGNOSIS — E78.5 HYPERLIPIDEMIA LDL GOAL <100: ICD-10-CM

## 2017-12-21 DIAGNOSIS — F32.5 MAJOR DEPRESSIVE DISORDER, SINGLE EPISODE IN FULL REMISSION (H): ICD-10-CM

## 2017-12-21 DIAGNOSIS — Z00.00 MEDICARE ANNUAL WELLNESS VISIT, SUBSEQUENT: Primary | ICD-10-CM

## 2017-12-21 DIAGNOSIS — R30.0 DYSURIA: ICD-10-CM

## 2017-12-21 DIAGNOSIS — E11.00 TYPE 2 DIABETES MELLITUS WITH HYPEROSMOLARITY WITHOUT COMA, WITHOUT LONG-TERM CURRENT USE OF INSULIN (H): ICD-10-CM

## 2017-12-21 PROCEDURE — 99213 OFFICE O/P EST LOW 20 MIN: CPT | Mod: ZP | Performed by: INTERNAL MEDICINE

## 2017-12-21 PROCEDURE — 99211 OFF/OP EST MAY X REQ PHY/QHP: CPT

## 2017-12-21 PROCEDURE — G0439 PPPS, SUBSEQ VISIT: HCPCS | Mod: ZP | Performed by: INTERNAL MEDICINE

## 2017-12-21 RX ORDER — FINASTERIDE 5 MG/1
5 TABLET, FILM COATED ORAL DAILY
Qty: 90 TABLET | Refills: 3 | Status: SHIPPED | OUTPATIENT
Start: 2017-12-21 | End: 2018-12-06

## 2017-12-21 RX ORDER — LISINOPRIL 20 MG/1
20 TABLET ORAL DAILY
Qty: 90 TABLET | Refills: 3 | Status: SHIPPED | OUTPATIENT
Start: 2017-12-21 | End: 2018-12-06

## 2017-12-21 RX ORDER — ZOLPIDEM TARTRATE 10 MG/1
10 TABLET ORAL
Qty: 10 TABLET | Refills: 5 | Status: SHIPPED | OUTPATIENT
Start: 2017-12-21 | End: 2019-01-14

## 2017-12-21 RX ORDER — TAMSULOSIN HYDROCHLORIDE 0.4 MG/1
0.4 CAPSULE ORAL DAILY
Qty: 90 CAPSULE | Refills: 3 | Status: SHIPPED | OUTPATIENT
Start: 2017-12-21 | End: 2018-12-06

## 2017-12-21 RX ORDER — CITALOPRAM HYDROBROMIDE 20 MG/1
20 TABLET ORAL DAILY
Qty: 90 TABLET | Refills: 3 | Status: SHIPPED | OUTPATIENT
Start: 2017-12-21 | End: 2018-12-06

## 2017-12-21 RX ORDER — ATORVASTATIN CALCIUM 40 MG/1
40 TABLET, FILM COATED ORAL DAILY
Qty: 90 TABLET | Refills: 3 | Status: SHIPPED | OUTPATIENT
Start: 2017-12-21 | End: 2018-12-06

## 2017-12-21 NOTE — PROGRESS NOTES
Dino Daniels is a 67 year old male who presents for:       Medicare annual wellness visit, subsequent  Major depressive disorder, single episode in full remission (H)  Essential hypertension with goal blood pressure less than 130/80  Hyperlipidemia LDL goal <100  Urinary retention  Type 2 diabetes mellitus with hyperosmolarity without coma, without long-term current use of insulin (H)  Primary insomnia  Dysuria  Tubular adenoma  Screening for prostate cancer  Primary hypersomnia     Current providers caring for this patient include:  Patient Care Team:  Abel Fernandez MD as PCP - General    Complete Medical and Social history reviewed with patient, outlined below.    Patient Active Problem List   Diagnosis     Pain in joint, ankle and foot     HYPERLIPIDEMIA LDL GOAL <100     Type 2 diabetes, HbA1c goal < 7% (H)     Major depressive disorder, single episode in full remission (H)       Past Medical History:   Diagnosis Date     Arthritis     knees, left ankle and right thumb     Bladder stone      DEPRESSIVE DISORDER NEC 10/9/2006    resolved in      Hypertension      Mumps      Other and unspecified hyperlipidemia     abstracted 02.     Type II or unspecified type diabetes mellitus without mention of complication, not stated as uncontrolled        Past Surgical History:   Procedure Laterality Date     LASER HOLMIUM LITHOTRIPSY BLADDER  2012    Procedure: LASER HOLMIUM LITHOTRIPSY BLADDER;  Cystoscopy with Removal of Bladder Stone with Holmium Laser;  Surgeon: Chaz Cobb MD;  Location: [facility]     TESTICLE SURGERY       VASECTOMY       VASECTOMY       wisdom teeth[         Family History   Problem Relation Age of Onset      Father       age 56 leukemia     CEREBROVASCULAR DISEASE Mother       Brother      b,195      Sister      b,4 back problems      Brother      b, back problems       Social History   Substance Use Topics     Smoking status: Never Smoker      "Smokeless tobacco: Never Used     Alcohol use 0.0 oz/week     0 Standard drinks or equivalent per week      Comment: rarely       Diet: regular, low salt/low fat  Physical Activity: generally inactive  Depression Screen:    Over the past 2 weeks, patient has felt down, depressed, or hopeless:  No    Over the past 2 weeks, patient has felt little interest or pleasure in doing things: No    Functional ability/Safety screen:  Up and go test (able to get up and walk longer than 30 seconds): Passed  Patient needs assistance with: nothing  Patient's home has the following possible safety concerns: none identified  Patient has concerns about his hearing:  No  Cognitive Screen  Patient repeats three objects (ball, flag, tree)      Clock drawing test:   NORMAL  Recalls three objects after 3 minutes (ball,flag,tree):                                                                                               recalls 3 objects (3 points)    Review Of Systems  Skin: negative  Eyes: negative  Ears/Nose/Throat: negative  Respiratory: No shortness of breath, dyspnea on exertion, cough, or hemoptysis  Cardiovascular: negative  Gastrointestinal: negative  Genitourinary: negative  Musculoskeletal: negative  Neurologic: negative  Psychiatric: negative  Hematologic/Lymphatic/Immunologic: negative  Endocrine: negative       Physical Exam:  /76 (BP Location: Right arm, Patient Position: Chair, Cuff Size: Adult Large)  Pulse 105  Ht 5' 11\" (1.803 m)  Wt 231 lb 9.6 oz (105.1 kg)  SpO2 95%  BMI 32.3 kg/m2   Body mass index is 32.3 kg/(m^2).              GENERAL APPEARANCE: healthy, alert and no distress  PSYCH: Affect normal/bright.  Mentation within normal limits.  EYES: conjunctiva clear  HENT: ear canals and TM's normal.  Nose and mouth without ulcers, erythema or lesions  NECK: supple, nontender, no lymphadenopathy  RESP: lungs clear to auscultation - no rales, rhonchi or wheezes  CV: regular rates and rhythm, normal S1 S2, " no murmur noted  ABDOMEN:  soft, nontender, no HSM or masses and bowel sounds normal  SKIN: no suspicious lesions or rashes  NEURO: Normal strength and tone,  normal speech and mentation  Extremities: no peripheral edema or tenderness, peripheral pulses normal  MS: extremities normal- no gross deformities noted, no erythema, FROM noted in all extremities  PSYCH: mentation appears normal  LYMPHATICS: normal ant/post cervical, supraclavicular, and axillary nodes    End of Life Planning:   Patient currently has an advanced directive: Yes.  Practitioner is supportive of decision. Full Code    Education/Counseling:   Based on review of the above information, the following items were addressed:      Obesity - appropriate counseling on diet, exercise, etc.      Diabetes -  access to diabetes self-management training, medical nutrition therapy, and treatment    Appropriate preventive services were discussed with this patient, including applicable screening as appropriate for cardiovascular disease, diabetes, osteopenia/osteoporosis, and glaucoma.  As appropriate for age/gender, discussed screening for colorectal cancer, prostate cancer, breast cancer, and cervical cancer.   Checklist reviewing preventive services available has been given to the patient.    (Z00.00) Medicare annual wellness visit, subsequent  (primary encounter diagnosis)  Comment: doing well  Plan: RTC one year for annual visit    (F32.5) Major depressive disorder, single episode in full remission (H)  Comment: doing well  Plan: OFFICE/OUTPT VISIT,BALAJI MANNINGL III, Follow-Up with        Vascular Medicine, citalopram (CELEXA) 20 MG         tablet           (I10) Essential hypertension with goal blood pressure less than 130/80  Comment: at goal  Plan: OFFICE/OUTPT VISIT,ESTLEVL III, Follow-Up with        Vascular Medicine, lisinopril         (PRINIVIL/ZESTRIL) 20 MG tablet           (E78.5) Hyperlipidemia LDL goal <100  Comment: at gola  Plan: OFFICE/OUTPT  VISIT,MARCOS MANNING III, Follow-Up with        Vascular Medicine, T3 Free, T4 free, TSH, Lipid        Profile, atorvastatin (LIPITOR) 40 MG tablet            (R33.9) Urinary retention  Comment: doing well  Plan: OFFICE/OUTPT VISIT,MARCOS MANNING III, Follow-Up with        Vascular Medicine, tamsulosin (FLOMAX) 0.4 MG         capsule, finasteride (PROSCAR) 5 MG tablet           (E11.00) Type 2 diabetes mellitus with A1C goal < 7 %  Comment: at goal  Plan: OFFICE/OUTPT VISIT,MARCOS MANNING III, Follow-Up with        Vascular Medicine, Hemoglobin A1c, Albumin         Random Urine Quantitative with Creat Ratio,         metFORMIN (GLUCOPHAGE) 1000 MG tablet, blood         glucose monitoring (NO BRAND SPECIFIED) test         strip            (F51.01) Primary insomnia  Comment: needs Ambien  Plan: OFFICE/OUTPT VISIT,MARCOS MANNING III, Follow-Up with        Vascular Medicine             (D36.9) Tubular adenoma  Comment: repeat colonosocpy in 2019  Plan: OFFICE/OUTPT VISIT,MARCOS MANNING III, Follow-Up with        Vascular Medicine           (Z12.5) Screening for prostate cancer  Plan: Prostate spec antigen screen            Greater than one half the 15 minutes not spent on preventive care during this visit was spent providing aducation and counselling regarding item(s) # 2 onward as delineated above.

## 2017-12-21 NOTE — MR AVS SNAPSHOT
After Visit Summary   12/21/2017    Dino Daniels    MRN: [id number]           Patient Information     Date Of Birth          1950        Visit Information        Provider Department      12/21/2017 1:00 PM Abel Fernandez MD [facility]      Today's Diagnoses     Medicare annual wellness visit, subsequent    -  1    Major depressive disorder, single episode in full remission (H)        Essential hypertension with goal blood pressure less than 130/80        Hyperlipidemia LDL goal <100        Urinary retention        Type 2 diabetes mellitus with A1C goal < 7 %        Primary insomnia        Dysuria        Tubular adenoma        Screening for prostate cancer        Primary hypersomnia           Follow-ups after your visit        Additional Services     Follow-Up with Vascular Medicine                 Future tests that were ordered for you today     Open Future Orders        Priority Expected Expires Ordered    Prostate spec antigen screen Routine 12/21/2018 12/21/2018 12/21/2017    Follow-Up with Vascular Medicine Routine 12/21/2018 12/21/2018 12/21/2017    CBC with platelets differential Routine 12/21/2018 12/21/2018 12/21/2017    T3 Free Routine 12/21/2018 12/21/2018 12/21/2017    T4 free Routine 12/21/2018 12/21/2018 12/21/2017    TSH Routine 12/21/2018 12/21/2018 12/21/2017    Basic metabolic panel Routine 12/21/2018 12/21/2018 12/21/2017    Hepatic panel Routine 12/21/2018 12/21/2018 12/21/2017    Lipid Profile Routine 12/21/2018 12/21/2018 12/21/2017    Hemoglobin A1c Routine 12/21/2018 12/21/2018 12/21/2017    Albumin Random Urine Quantitative with Creat Ratio Routine 12/21/2018 12/21/2018 12/21/2017            Who to contact     If you have questions or need follow up information about today's clinic visit or your schedule please contact [facility] directly at [phone].  Normal or  "non-critical lab and imaging results will be communicated to you by MyChart, letter or phone within 4 business days after the clinic has received the results. If you do not hear from us within 7 days, please contact the clinic through [employer] or phone. If you have a critical or abnormal lab result, we will notify you by phone as soon as possible.  Submit refill requests through [employer] or call your pharmacy and they will forward the refill request to us. Please allow 3 business days for your refill to be completed.          Additional Information About Your Visit        [employer] Information     [employer] gives you secure access to your electronic health record. If you see a primary care provider, you can also send messages to your care team and make appointments. If you have questions, please call your primary care clinic.  If you do not have a primary care provider, please call [phone] and they will assist you.        Care EveryWhere ID     This is your Care EveryWhere ID. This could be used by other organizations to access your [place] medical records  [id number]        Your Vitals Were     Pulse Height Pulse Oximetry BMI (Body Mass Index)          105 5' 11\" (1.803 m) 95% 32.3 kg/m2         Blood Pressure from Last 3 Encounters:   12/21/17 118/76   07/18/17 110/72   02/06/17 129/87    Weight from Last 3 Encounters:   12/21/17 231 lb 9.6 oz (105.1 kg)   07/18/17 220 lb (99.8 kg)   02/06/17 231 lb (104.8 kg)              We Performed the Following     Follow-Up with Vascular Medicine     OFFICE/OUTPT VISIT,EST,LEVL III          Today's Medication Changes          These changes are accurate as of: 12/21/17  1:46 PM.  If you have any questions, ask your nurse or doctor.               These medicines have changed or have updated prescriptions.        Dose/Directions    metFORMIN 1000 MG tablet   Commonly known as:  GLUCOPHAGE   This may have changed:  See the new instructions.   Used for:  Type 2 diabetes " mellitus with hyperosmolarity without coma, without long-term current use of insulin (H)   Changed by:  Abel Fernandez MD        TAKE ONE TABLET BY MOUTH TWICE DAILY   Quantity:  180 tablet   Refills:  3            Where to get your medicines      These medications were sent to [id number] - [place], [place] - [facility] [address] [place], [facility] [address]     Phone:  [phone]     atorvastatin 40 MG tablet    blood glucose monitoring test strip    citalopram 20 MG tablet    finasteride 5 MG tablet    lisinopril 20 MG tablet    metFORMIN 1000 MG tablet    tamsulosin 0.4 MG capsule         Some of these will need a paper prescription and others can be bought over the counter.  Ask your nurse if you have questions.     Bring a paper prescription for each of these medications     zolpidem 10 MG tablet                Primary Care Provider Office Phone # Fax #    Abel Fernandez [phone]       [facility] [address]  [place] [address]        Equal Access to Services     POLO GOLDMAN AH: Hadii aad ku hadasho Soomaali, waaxda luqadaha, qaybta kaalmada adeegyada, waxay idiin haygretan marina traore . So [facility] [phone].    ATENCIÓN: Si habla español, tiene a hernández disposición servicios gratuitos de asistencia lingüística. [facility] [phone].    We comply with applicable federal civil rights laws and [place] laws. We do not discriminate on the basis of race, color, national origin, age, disability, sex, sexual orientation, or gender identity.            Thank you!     Thank you for choosing [facility] [place]  for your care. Our goal is always to provide you with excellent care. Hearing back from our patients is one way we can continue to improve our services. Please take a few minutes to complete the written survey that you may receive in the mail after your visit with us. Thank you!             Your Updated Medication List  - Protect others around you: Learn how to safely use, store and throw away your medicines at www.disposemymeds.org.          This list is accurate as of: 12/21/17  1:46 PM.  Always use your most recent med list.                   Brand Name Dispense Instructions for use Diagnosis    ASCENSIA BREEZE MONITOR Kit     1 kit    1 each daily.    Impaired fasting glucose       ASPIRIN PO      Take 325 mg by mouth daily        atorvastatin 40 MG tablet    LIPITOR    90 tablet    Take 1 tablet (40 mg) by mouth daily    Hyperlipidemia LDL goal <100       blood glucose monitoring test strip    no brand specified    300 each    Pt. Is testing 3 times daily ### DO NOT FILL NOW.  Please update patient's profile to reflect additional refills.  ####    Type 2 diabetes mellitus with hyperosmolarity without coma, without long-term current use of insulin (H)       citalopram 20 MG tablet    celeXA    90 tablet    Take 1 tablet (20 mg) by mouth daily    Major depressive disorder, single episode in full remission (H)       finasteride 5 MG tablet    PROSCAR    90 tablet    Take 1 tablet (5 mg) by mouth daily    Urinary retention       KRILL OIL PO      None Entered        lisinopril 20 MG tablet    PRINIVIL/ZESTRIL    90 tablet    Take 1 tablet (20 mg) by mouth daily    Essential hypertension with goal blood pressure less than 130/80       metFORMIN 1000 MG tablet    GLUCOPHAGE    180 tablet    TAKE ONE TABLET BY MOUTH TWICE DAILY    Type 2 diabetes mellitus with hyperosmolarity without coma, without long-term current use of insulin (H)       MULTIPLE VITAMINS CAPS   OR          1 capsule qd        tamsulosin 0.4 MG capsule    FLOMAX    90 capsule    Take 1 capsule (0.4 mg) by mouth daily    Urinary retention       thin lancets    NO BRAND SPECIFIED    3 Box    1 each 3 times daily    Type 2 diabetes mellitus with hyperosmolarity without coma, without long-term current use of insulin (H)       zolpidem 10 MG tablet    AMBIEN    10  tablet    Take 1 tablet (10 mg) by mouth nightly as needed for sleep    Primary hypersomnia

## 2017-12-21 NOTE — NURSING NOTE
"Chief Complaint   Patient presents with     RECHECK     follow up labs        Initial /76 (BP Location: Right arm, Patient Position: Chair, Cuff Size: Adult Large)  Pulse 105  Ht 5' 11\" (1.803 m)  Wt 231 lb 9.6 oz (105.1 kg)  SpO2 95%  BMI 32.3 kg/m2 Estimated body mass index is 32.3 kg/(m^2) as calculated from the following:    Height as of this encounter: 5' 11\" (1.803 m).    Weight as of this encounter: 231 lb 9.6 oz (105.1 kg).  Medication Reconciliation: complete    Face to Face time 8 minutes  Denise Reyna CMA      "

## 2018-04-23 ENCOUNTER — MYC MEDICAL ADVICE (OUTPATIENT)
Dept: OTHER | Facility: CLINIC | Age: 68
End: 2018-04-23

## 2018-04-23 DIAGNOSIS — R35.1 NOCTURIA: Primary | ICD-10-CM

## 2018-04-23 NOTE — TELEPHONE ENCOUNTER
I would send him to [facility]. Not normal to need a condom cath. He should be evaluated by a urologist rather than just doing a condom cath.

## 2018-05-31 ENCOUNTER — TRANSFERRED RECORDS (OUTPATIENT)
Dept: HEALTH INFORMATION MANAGEMENT | Facility: CLINIC | Age: 68
End: 2018-05-31

## 2018-07-10 ENCOUNTER — TRANSFERRED RECORDS (OUTPATIENT)
Dept: HEALTH INFORMATION MANAGEMENT | Facility: CLINIC | Age: 68
End: 2018-07-10

## 2018-12-04 ENCOUNTER — TELEPHONE (OUTPATIENT)
Dept: OTHER | Facility: CLINIC | Age: 68
End: 2018-12-04

## 2018-12-04 DIAGNOSIS — Z12.5 SCREENING FOR PROSTATE CANCER: ICD-10-CM

## 2018-12-04 DIAGNOSIS — E11.00 TYPE 2 DIABETES MELLITUS WITH HYPEROSMOLARITY WITHOUT COMA, WITHOUT LONG-TERM CURRENT USE OF INSULIN (H): ICD-10-CM

## 2018-12-04 DIAGNOSIS — E78.5 HYPERLIPIDEMIA LDL GOAL <100: ICD-10-CM

## 2018-12-04 LAB
ALBUMIN SERPL-MCNC: 4.2 G/DL (ref 3.4–5)
ALP SERPL-CCNC: 47 U/L (ref 40–150)
ALT SERPL W P-5'-P-CCNC: 37 U/L (ref 0–70)
ANION GAP SERPL CALCULATED.3IONS-SCNC: 10 MMOL/L (ref 3–14)
AST SERPL W P-5'-P-CCNC: 22 U/L (ref 0–45)
BASOPHILS # BLD AUTO: 0 10E9/L (ref 0–0.2)
BASOPHILS NFR BLD AUTO: 0.3 %
BILIRUB DIRECT SERPL-MCNC: 0.2 MG/DL (ref 0–0.2)
BILIRUB SERPL-MCNC: 0.7 MG/DL (ref 0.2–1.3)
BUN SERPL-MCNC: 19 MG/DL (ref 7–30)
CALCIUM SERPL-MCNC: 9.3 MG/DL (ref 8.5–10.1)
CHLORIDE SERPL-SCNC: 100 MMOL/L (ref 94–109)
CHOLEST SERPL-MCNC: 133 MG/DL
CO2 SERPL-SCNC: 23 MMOL/L (ref 20–32)
CREAT SERPL-MCNC: 0.92 MG/DL (ref 0.66–1.25)
CREAT UR-MCNC: 92 MG/DL
DIFFERENTIAL METHOD BLD: NORMAL
EOSINOPHIL # BLD AUTO: 0.1 10E9/L (ref 0–0.7)
EOSINOPHIL NFR BLD AUTO: 1.9 %
ERYTHROCYTE [DISTWIDTH] IN BLOOD BY AUTOMATED COUNT: 13.7 % (ref 10–15)
GFR SERPL CREATININE-BSD FRML MDRD: 82 ML/MIN/1.7M2
GLUCOSE SERPL-MCNC: 374 MG/DL (ref 70–99)
HBA1C MFR BLD: 11.9 % (ref 0–5.6)
HCT VFR BLD AUTO: 43.8 % (ref 40–53)
HDLC SERPL-MCNC: 32 MG/DL
HGB BLD-MCNC: 15 G/DL (ref 13.3–17.7)
LDLC SERPL CALC-MCNC: 60 MG/DL
LYMPHOCYTES # BLD AUTO: 2.8 10E9/L (ref 0.8–5.3)
LYMPHOCYTES NFR BLD AUTO: 43.8 %
MCH RBC QN AUTO: 29.7 PG (ref 26.5–33)
MCHC RBC AUTO-ENTMCNC: 34.2 G/DL (ref 31.5–36.5)
MCV RBC AUTO: 87 FL (ref 78–100)
MICROALBUMIN UR-MCNC: 6 MG/L
MICROALBUMIN/CREAT UR: 6.64 MG/G CR (ref 0–17)
MONOCYTES # BLD AUTO: 0.5 10E9/L (ref 0–1.3)
MONOCYTES NFR BLD AUTO: 7.1 %
NEUTROPHILS # BLD AUTO: 3 10E9/L (ref 1.6–8.3)
NEUTROPHILS NFR BLD AUTO: 46.9 %
NONHDLC SERPL-MCNC: 101 MG/DL
PLATELET # BLD AUTO: 166 10E9/L (ref 150–450)
POTASSIUM SERPL-SCNC: 4.5 MMOL/L (ref 3.4–5.3)
PROT SERPL-MCNC: 7.8 G/DL (ref 6.8–8.8)
PSA SERPL-ACNC: 1.55 UG/L (ref 0–4)
RBC # BLD AUTO: 5.05 10E12/L (ref 4.4–5.9)
SODIUM SERPL-SCNC: 133 MMOL/L (ref 133–144)
T3FREE SERPL-MCNC: 2.5 PG/ML (ref 2.3–4.2)
T4 FREE SERPL-MCNC: 0.97 NG/DL (ref 0.76–1.46)
TRIGL SERPL-MCNC: 207 MG/DL
TSH SERPL DL<=0.005 MIU/L-ACNC: 2.26 MU/L (ref 0.4–4)
WBC # BLD AUTO: 6.3 10E9/L (ref 4–11)

## 2018-12-04 PROCEDURE — 80076 HEPATIC FUNCTION PANEL: CPT | Performed by: INTERNAL MEDICINE

## 2018-12-04 PROCEDURE — G0103 PSA SCREENING: HCPCS | Performed by: INTERNAL MEDICINE

## 2018-12-04 PROCEDURE — 82043 UR ALBUMIN QUANTITATIVE: CPT | Performed by: INTERNAL MEDICINE

## 2018-12-04 PROCEDURE — 83036 HEMOGLOBIN GLYCOSYLATED A1C: CPT | Performed by: INTERNAL MEDICINE

## 2018-12-04 PROCEDURE — 36415 COLL VENOUS BLD VENIPUNCTURE: CPT | Performed by: INTERNAL MEDICINE

## 2018-12-04 PROCEDURE — 84481 FREE ASSAY (FT-3): CPT | Performed by: INTERNAL MEDICINE

## 2018-12-04 PROCEDURE — 80061 LIPID PANEL: CPT | Performed by: INTERNAL MEDICINE

## 2018-12-04 PROCEDURE — 85025 COMPLETE CBC W/AUTO DIFF WBC: CPT | Performed by: INTERNAL MEDICINE

## 2018-12-04 PROCEDURE — 84439 ASSAY OF FREE THYROXINE: CPT | Performed by: INTERNAL MEDICINE

## 2018-12-04 PROCEDURE — 80048 BASIC METABOLIC PNL TOTAL CA: CPT | Performed by: INTERNAL MEDICINE

## 2018-12-04 PROCEDURE — 84443 ASSAY THYROID STIM HORMONE: CPT | Performed by: INTERNAL MEDICINE

## 2018-12-04 NOTE — TELEPHONE ENCOUNTER
Per Dr. Fernandez Please call patient. A1c is unacceptably high at 11.9. Please have patient move 12-13 appt up to 12-5,6, or 7.    I called pt, left vm, requested pt to call back to move OV to a more recent date as soon as possible.     Routing to  to f/u with pt again today if no return call to move appt date.     Eunice Villagran, KAYODEN, RN

## 2018-12-06 ENCOUNTER — HOSPITAL ENCOUNTER (OUTPATIENT)
Dept: LAB | Facility: CLINIC | Age: 68
Discharge: HOME | End: 2018-12-06
Attending: INTERNAL MEDICINE | Admitting: INTERNAL MEDICINE
Payer: MEDICARE

## 2018-12-06 ENCOUNTER — OFFICE VISIT (OUTPATIENT)
Dept: OTHER | Facility: CLINIC | Age: 68
End: 2018-12-06
Attending: INTERNAL MEDICINE
Payer: COMMERCIAL

## 2018-12-06 VITALS
HEART RATE: 103 BPM | SYSTOLIC BLOOD PRESSURE: 118 MMHG | OXYGEN SATURATION: 95 % | DIASTOLIC BLOOD PRESSURE: 79 MMHG | WEIGHT: 200.4 LBS | BODY MASS INDEX: 27.95 KG/M2

## 2018-12-06 DIAGNOSIS — Z12.5 SCREENING FOR PROSTATE CANCER: ICD-10-CM

## 2018-12-06 DIAGNOSIS — E11.00 TYPE 2 DIABETES MELLITUS WITH HYPEROSMOLARITY WITHOUT COMA, WITHOUT LONG-TERM CURRENT USE OF INSULIN (H): ICD-10-CM

## 2018-12-06 DIAGNOSIS — R30.0 DYSURIA: ICD-10-CM

## 2018-12-06 DIAGNOSIS — Z00.00 MEDICARE ANNUAL WELLNESS VISIT, SUBSEQUENT: Primary | ICD-10-CM

## 2018-12-06 DIAGNOSIS — F32.5 MAJOR DEPRESSIVE DISORDER, SINGLE EPISODE IN FULL REMISSION (H): ICD-10-CM

## 2018-12-06 DIAGNOSIS — R33.9 URINARY RETENTION: ICD-10-CM

## 2018-12-06 DIAGNOSIS — E78.5 HYPERLIPIDEMIA LDL GOAL <100: ICD-10-CM

## 2018-12-06 DIAGNOSIS — I10 ESSENTIAL HYPERTENSION WITH GOAL BLOOD PRESSURE LESS THAN 130/80: ICD-10-CM

## 2018-12-06 LAB
ANION GAP SERPL CALCULATED.3IONS-SCNC: 9 MMOL/L (ref 3–14)
BUN SERPL-MCNC: 23 MG/DL (ref 7–30)
CALCIUM SERPL-MCNC: 9.7 MG/DL (ref 8.5–10.1)
CHLORIDE SERPL-SCNC: 101 MMOL/L (ref 94–109)
CO2 SERPL-SCNC: 25 MMOL/L (ref 20–32)
CREAT SERPL-MCNC: 0.99 MG/DL (ref 0.66–1.25)
GFR SERPL CREATININE-BSD FRML MDRD: 75 ML/MIN/1.7M2
GLUCOSE SERPL-MCNC: 291 MG/DL (ref 70–99)
HBA1C MFR BLD: 12.1 % (ref 0–5.6)
POTASSIUM SERPL-SCNC: 4.5 MMOL/L (ref 3.4–5.3)
SODIUM SERPL-SCNC: 135 MMOL/L (ref 133–144)

## 2018-12-06 PROCEDURE — G0439 PPPS, SUBSEQ VISIT: HCPCS | Mod: ZP | Performed by: INTERNAL MEDICINE

## 2018-12-06 PROCEDURE — 83036 HEMOGLOBIN GLYCOSYLATED A1C: CPT | Performed by: INTERNAL MEDICINE

## 2018-12-06 PROCEDURE — 80048 BASIC METABOLIC PNL TOTAL CA: CPT | Performed by: INTERNAL MEDICINE

## 2018-12-06 PROCEDURE — 99214 OFFICE O/P EST MOD 30 MIN: CPT | Mod: ZP | Performed by: INTERNAL MEDICINE

## 2018-12-06 PROCEDURE — G0463 HOSPITAL OUTPT CLINIC VISIT: HCPCS

## 2018-12-06 PROCEDURE — 36415 COLL VENOUS BLD VENIPUNCTURE: CPT | Performed by: INTERNAL MEDICINE

## 2018-12-06 RX ORDER — CITALOPRAM HYDROBROMIDE 20 MG/1
20 TABLET ORAL DAILY
Qty: 90 TABLET | Refills: 3 | Status: SHIPPED | OUTPATIENT
Start: 2018-12-06 | End: 2019-12-18

## 2018-12-06 RX ORDER — FINASTERIDE 5 MG/1
5 TABLET, FILM COATED ORAL DAILY
Qty: 90 TABLET | Refills: 3 | Status: SHIPPED | OUTPATIENT
Start: 2018-12-06 | End: 2019-11-29

## 2018-12-06 RX ORDER — TAMSULOSIN HYDROCHLORIDE 0.4 MG/1
0.4 CAPSULE ORAL DAILY
Qty: 90 CAPSULE | Refills: 3 | Status: SHIPPED | OUTPATIENT
Start: 2018-12-06 | End: 2019-11-29

## 2018-12-06 RX ORDER — LISINOPRIL 20 MG/1
20 TABLET ORAL DAILY
Qty: 90 TABLET | Refills: 3 | Status: SHIPPED | OUTPATIENT
Start: 2018-12-06 | End: 2019-11-29

## 2018-12-06 RX ORDER — LIRAGLUTIDE 6 MG/ML
INJECTION SUBCUTANEOUS
Qty: 9 ML | Refills: 3 | Status: SHIPPED | OUTPATIENT
Start: 2018-12-06 | End: 2019-01-09

## 2018-12-06 RX ORDER — ATORVASTATIN CALCIUM 40 MG/1
40 TABLET, FILM COATED ORAL DAILY
Qty: 90 TABLET | Refills: 3 | Status: SHIPPED | OUTPATIENT
Start: 2018-12-06 | End: 2019-11-29

## 2018-12-06 NOTE — NURSING NOTE
"Dino Daniels is a 68 year old male who presents for:  Chief Complaint   Patient presents with     RECHECK     Follow up fasting labs        Vitals:    Vitals:    12/06/18 1343   BP: 118/79   BP Location: Right arm   Patient Position: Chair   Cuff Size: Adult Large   Pulse: 103   SpO2: 95%   Weight: 200 lb 6.4 oz (90.9 kg)       BMI:  Estimated body mass index is 27.95 kg/(m^2) as calculated from the following:    Height as of 12/21/17: 5' 11\" (1.803 m).    Weight as of this encounter: 200 lb 6.4 oz (90.9 kg).    Pain Score:  Data Unavailable        Kerry Dooley MA      "

## 2018-12-06 NOTE — PROGRESS NOTES
Dino Daniels is a 68 year old male who presents for        Medicare annual wellness visit, subsequent  Type 2 diabetes mellitus with hyperosmolarity without coma, without long-term current use of insulin (H)  Essential hypertension with goal blood pressure less than 130/80  Hyperlipidemia LDL goal <100  Urinary retention  Screening for prostate cancer     Current providers caring for this patient include:  Patient Care Team:  Abel Fernandez MD as PCP - General    Complete Medical and Social history reviewed with patient, outlined below.    Patient Active Problem List   Diagnosis     Pain in joint, ankle and foot     HYPERLIPIDEMIA LDL GOAL <100     Type 2 diabetes, HbA1c goal < 7% (H)     Major depressive disorder, single episode in full remission (H)       Past Medical History:   Diagnosis Date     Arthritis     knees, left ankle and right thumb     Bladder stone      DEPRESSIVE DISORDER NEC 10/9/2006    resolved in      Hypertension      Mumps      Other and unspecified hyperlipidemia     abstracted 02.     Type II or unspecified type diabetes mellitus without mention of complication, not stated as uncontrolled        Past Surgical History:   Procedure Laterality Date     LASER HOLMIUM LITHOTRIPSY BLADDER  2012    Procedure: LASER HOLMIUM LITHOTRIPSY BLADDER;  Cystoscopy with Removal of Bladder Stone with Holmium Laser;  Surgeon: Chaz Cobb MD;  Location: [facility]     TESTICLE SURGERY       VASECTOMY       VASECTOMY       wisdom teeth[         Family History   Problem Relation Age of Onset      Father       age 56 leukemia     Cerebrovascular Disease Mother       Brother      b,1952      Sister      b,1954 back problems      Brother      b,1955 back problems       Social History   Substance Use Topics     Smoking status: Never Smoker     Smokeless tobacco: Never Used     Alcohol use 0.0 oz/week     0 Standard drinks or equivalent per week      Comment: rarely        Diet: regular, low salt/low fat  Physical Activity: active without specific exercise program  Depression Screen:    Over the past 2 weeks, patient has felt down, depressed, or hopeless:  No    Over the past 2 weeks, patient has felt little interest or pleasure in doing things: No    Functional ability/Safety screen:  Up and go test (able to get up and walk longer than 30 seconds): Passed  Patient needs assistance with: nothing  Patient's home has the following possible safety concerns: none identified  Patient has concerns about his hearing:  No  Cognitive Screen  Patient repeats three objects (ball, flag, tree)      Clock drawing test: click delete button to remove this line now  NORMAL  Recalls three objects after 3 minutes (ball,flag,tree):                                                                                               recalls 3 objects (3 points)      Review Of Systems  Skin: negative  Eyes: negative  Ears/Nose/Throat: negative  Respiratory: No shortness of breath, dyspnea on exertion, cough, or hemoptysis  Cardiovascular: negative  Gastrointestinal: negative  Genitourinary: polyuria  Musculoskeletal: negative  Neurologic: negative  Psychiatric: negative  Hematologic/Lymphatic/Immunologic: negative  Endocrine: polydipsia          Physical Exam:  /79 (BP Location: Right arm, Patient Position: Chair, Cuff Size: Adult Large)  Pulse 103  Wt 200 lb 6.4 oz (90.9 kg)  SpO2 95%  BMI 27.95 kg/m2   Body mass index is 27.95 kg/(m^2).           GENERAL APPEARANCE: healthy, alert and no distress  PSYCH: Affect normal/bright.  Mentation within normal limits.  EYES: conjunctiva clear  HENT: ear canals and TM's normal.  Nose and mouth without ulcers, erythema or lesions  NECK: supple, nontender, no lymphadenopathy  RESP: lungs clear to auscultation - no rales, rhonchi or wheezes  CV: regular rates and rhythm, normal S1 S2, no murmur noted  ABDOMEN:  soft, nontender, no HSM or masses and bowel  sounds normal  SKIN: no suspicious lesions or rashes  NEURO: Normal strength and tone,  normal speech and mentation  Extremities: no peripheral edema or tenderness, peripheral pulses normal  MS: extremities normal- no gross deformities noted, no erythema, FROM noted in all extremities  PSYCH: mentation appears normal  LYMPHATICS: no cervical adenopathy    End of Life Planning:   Patient currently has an advanced directive: Yes.  Practitioner is supportive of decision. Full code     Education/Counseling:   Based on review of the above information, the following items were addressed:      Diabetes -  access to diabetes self-management training, medical nutrition therapy, and treatment    Appropriate preventive services were discussed with this patient, including applicable screening as appropriate for cardiovascular disease, diabetes, osteopenia/osteoporosis, and glaucoma.  As appropriate for age/gender, discussed screening for colorectal cancer, prostate cancer, breast cancer, and cervical cancer.   Checklist reviewing preventive services available has been given to the patient.        (Z00.00) Medicare annual wellness visit, subsequent  (primary encounter diagnosis)  Comment: colonosocpy due in 2019  Plan: RTC one year for annual physical    (E11.00) Type 2 diabetes mellitus with A1C goal < 7 %  Comment: He has been taking a liver supplement called Liverite LiverAid. I instructed him to abstain form this. A1C has historically been very well controlled just with metformin. No recent steroid use. Now 11.9. While I could start insulin on him, he is a glucagon naive patient. In my experience , patients who are glucagon naive benefit from treatment with a GLP-1 analog. Since Victoza has CV outcomes reduction data associated its use, we will proceed with that, but also have the patient see diabetic education, and start for the time being testing glucose QID for self education regarding glycemic consequences of dietary  choices.   Plan: Hemoglobin A1c, Basic metabolic panel,         liraglutide (VICTOZA) 18 MG/3ML solution,         DIABETES EDUCATOR REFERRAL, Basic metabolic         panel, UA with Microscopic, Hemoglobin A1c,         Albumin Random Urine Quantitative with Creat         Ratio            (I10) Essential hypertension with goal blood pressure less than 130/80  Comment: at goal  Plan: CBC with platelets differential           (E78.5) Hyperlipidemia LDL goal <100  Comment: at goal  Plan: T3 Free, T4 free, TSH, Basic metabolic panel,         Lipid Profile           (R33.9) Urinary retention  Comment: this may in fact be due to hyperglycemia wherein he is having polyuria  Plan: hold off on  referral for now, Treat DM2     (Z12.5) Screening for prostate cancer  Plan: annual surveillance as PSA is WNL    Greater than one half the twenty five minutes total spent on the pt's visit were spent providing education and counselling to the patient regarding the above matters. Extended time beyond usual annual preventive physical due to significant jump in A1C from 6.6 to 11.9.

## 2018-12-06 NOTE — PATIENT INSTRUCTIONS
1-Regarding your diabetes:    A- continue metformin without change.  B- start Victoza, initially at a dose on 0.6 mg injected SQ daily then, 1.2 mg daily for two weeks, then 1.8 mg daily thereafter.  C-start checking your blood glucose four times daily before meals and at bedtime.  D-see diabetic education for diet instruction.  E-Repeat your A1c and BMP today.  F-Repeat all labs in three months and see Dr. Fernandez a week later.

## 2018-12-06 NOTE — MR AVS SNAPSHOT
After Visit Summary   12/6/2018    Dino Daniels    MRN: [id number]           Patient Information     Date Of Birth          1950        Visit Information        Provider Department      12/6/2018 1:40 PM Abel Fernandez MD [facility]      Today's Diagnoses     Medicare annual wellness visit, subsequent    -  1    Type 2 diabetes mellitus with A1C goal < 7 %        Essential hypertension with goal blood pressure less than 130/80        Hyperlipidemia LDL goal <100        Urinary retention        Screening for prostate cancer        Dysuria        Major depressive disorder, single episode in full remission (H)          Care Instructions    1-Regarding your diabetes:    A- continue metformin without change.  B- start Victoza, initially at a dose on 0.6 mg injected SQ daily then, 1.2 mg daily for two weeks, then 1.8 mg daily thereafter.  C-start checking your blood glucose four times daily before meals and at bedtime.  D-see diabetic education for diet instruction.  E-Repeat your A1c and BMP today.  F-Repeat all labs in three months and see Dr. Fernandez a week later.          Follow-ups after your visit        Additional Services     DIABETES EDUCATOR REFERRAL       DIABETES SELF MANAGEMENT TRAINING (DSMT)      Your provider has referred you to Diabetes Education: FMG: Diabetes Education - All [facility] [phone]   [email or web address]     If an urgent visit is needed or A1C is above 12, Care Team to call the Diabetes  Education Team at [phone] or send an In Basket message to the Diabetes Education Pool (P DIAB ED-PATIENT CARE).    A  will call you to make your appointment. If it has been more than 3 business days since your referral was placed, please call the above phone number to schedule.    Type of training and number of hours: Previous  Diagnosis: Initial group DSMT - 10 hours.      Diabetes Type: Type 2 - On Oral Medication   Medicare covers: 10 hours of initial DSMT in 12 month period from the time of first visit, plus 2 hours of follow-up DSMT annually, and additional hours as requested for insulin training.         Diabetes Co-Morbidities: none               A1C Goal:  <7.0       A1C is: Lab Results       Component                Value               Date                       A1C                      11.9                12/04/2018              MEDICAL NUTRITION THERAPY (MNT) for Diabetes    Medical Nutrition Therapy with a Registered Dietitian can be provided in coordination with Diabetes Self-Management Training to assist in achieving optimal diabetes management.    MNT Type and Hours: Previous diagnosis: Initial MNT - 3 hours                       Medicare will cover: 3 hours initial MNT in 12 month period after first visit, plus 2 hours of follow-up MNT annually        Diabetes Education Topics: Comprehensive Knowledge Assessment and Instruction, Knowledge: Healthy Eating, Being Active, Monitoring Blood Sugar and Taking Medication, Blood glucose meter instruction  and Medication Start: GLP-1: Type/Dose/Timing: Victoza, eventual dose 1.8 mg daily    Special Educational Needs Requiring Individual DSMT: None      Please be aware that coverage of these services is subject to the terms and limitations of your health insurance plan.  Call member services at your health plan to determine Diabetes Self-Management Training (Codes  and ) and Medical Nutrition Therapy (Codes 16336 and 71597) benefits and ask which blood glucose monitor brands are covered by your plan.  Please bring the following with you to your appointment:    (1)  List of current medications   (2)  List of Blood Glucose Monitor brands that are covered by your insurance plan  (3)  Blood Glucose Monitor and log book  (4)   Food records for the 3 days prior to your  visit    The Certified Diabetes Educator may make diabetes medication adjustments per the CDE Protocol and Collaborative Practice Agreement.                  Future tests that were ordered for you today     Open Future Orders        Priority Expected Expires Ordered    CBC with platelets differential Routine 3/6/2019 3/6/2019 12/6/2018    T3 Free Routine 3/6/2019 3/6/2019 12/6/2018    T4 free Routine 3/6/2019 3/6/2019 12/6/2018    TSH Routine 3/6/2019 3/6/2019 12/6/2018    Basic metabolic panel Routine 3/6/2019 3/6/2019 12/6/2018    UA with Microscopic Routine 3/6/2019 3/6/2019 12/6/2018    Hemoglobin A1c Routine 3/6/2019 3/6/2019 12/6/2018    Albumin Random Urine Quantitative with Creat Ratio Routine 3/6/2019 3/6/2019 12/6/2018    Lipid Profile Routine 3/6/2019 3/6/2019 12/6/2018    DIABETES EDUCATOR REFERRAL Routine 12/6/2018 12/6/2018 12/6/2018    Hemoglobin A1c Routine 12/6/2018 12/6/2018 12/6/2018    Basic metabolic panel Routine 12/6/2018 12/6/2018 12/6/2018            Who to contact     If you have questions or need follow up information about today's clinic visit or your schedule please contact [facility] directly at [phone].  Normal or non-critical lab and imaging results will be communicated to you by Ardelyxhart, letter or phone within 4 business days after the clinic has received the results. If you do not hear from us within 7 days, please contact the clinic through [employer] or phone. If you have a critical or abnormal lab result, we will notify you by phone as soon as possible.  Submit refill requests through cloudswave or call your pharmacy and they will forward the refill request to us. Please allow 3 business days for your refill to be completed.          Additional Information About Your Visit        Ardely[employer]PRTUNITY Information     cloudswave gives you secure access to your electronic health record. If you see a primary care provider, you can also send messages to your care team and make  appointments. If you have questions, please call your primary care clinic.  If you do not have a primary care provider, please call [phone] and they will assist you.        Care EveryWhere ID     This is your Care EveryWhere ID. This could be used by other organizations to access your [place] medical records  [id number]        Your Vitals Were     Pulse Pulse Oximetry BMI (Body Mass Index)             103 95% 27.95 kg/m2          Blood Pressure from Last 3 Encounters:   12/06/18 118/79   12/21/17 118/76   07/18/17 110/72    Weight from Last 3 Encounters:   12/06/18 200 lb 6.4 oz (90.9 kg)   12/21/17 231 lb 9.6 oz (105.1 kg)   07/18/17 220 lb (99.8 kg)              We Performed the Following     Follow-Up with Vascular Medicine     Follow-Up with Vascular Medicine     OFFICE/OUTPT VISIT,MARCOS MANNING IV          Today's Medication Changes          These changes are accurate as of 12/6/18  2:55 PM.  If you have any questions, ask your nurse or doctor.               Start taking these medicines.        Dose/Directions    liraglutide 18 MG/3ML solution   Commonly known as:  VICTOZA   Used for:  Type 2 diabetes mellitus with hyperosmolarity without coma, without long-term current use of insulin (H)   Started by:  Abel Fernandez MD        0.6 mg SQ daily times two weeks then 1.2 mg SQ daily times two weeks then 1.8 mg SQ daily times thereafter indefinitely   Quantity:  9 mL   Refills:  3         These medicines have changed or have updated prescriptions.        Dose/Directions    * ASCENSIA BREEZE MONITOR Kit   This may have changed:  Another medication with the same name was added. Make sure you understand how and when to take each.   Used for:  Impaired fasting glucose   Changed by:  Abel Fernandez MD        Dose:  1 each   1 each daily.   Quantity:  1 kit   Refills:  0       * blood glucose monitoring meter device kit   Commonly known as:  NO BRAND SPECIFIED   This may have changed:  You were  already taking a medication with the same name, and this prescription was added. Make sure you understand how and when to take each.   Used for:  Type 2 diabetes mellitus with hyperosmolarity without coma, without long-term current use of insulin (H)   Changed by:  Abel Fernandez MD        Use to test blood sugar 4 times daily or as directed. Increase frequency of monitoring due to A1C of 11.9.   Quantity:  1 kit   Refills:  0       * blood glucose monitoring test strip   Commonly known as:  NO BRAND SPECIFIED   This may have changed:  Another medication with the same name was added. Make sure you understand how and when to take each.   Used for:  Type 2 diabetes mellitus with hyperosmolarity without coma, without long-term current use of insulin (H)   Changed by:  Abel Fernandez MD        Pt. Is testing 3 times daily ### DO NOT FILL NOW.  Please update patient's profile to reflect additional refills.  ####   Quantity:  300 each   Refills:  3       * blood glucose monitoring test strip   Commonly known as:  NO BRAND SPECIFIED   This may have changed:  You were already taking a medication with the same name, and this prescription was added. Make sure you understand how and when to take each.   Used for:  Type 2 diabetes mellitus with hyperosmolarity without coma, without long-term current use of insulin (H)   Changed by:  Abel Fernandez MD        Use to test blood sugars 4 times daily or as directed   Quantity:  400 strip   Refills:  3       * Notice:  This list has 4 medication(s) that are the same as other medications prescribed for you. Read the directions carefully, and ask your doctor or other care provider to review them with you.         Where to get your medicines      These medications were sent to [id number] [place] [address] [address]     Phone:  [phone]     atorvastatin 40 MG tablet    blood glucose  monitoring meter device kit    blood glucose monitoring test strip    citalopram 20 MG tablet    finasteride 5 MG tablet    liraglutide 18 MG/3ML solution    lisinopril 20 MG tablet    metFORMIN 1000 MG tablet    tamsulosin 0.4 MG capsule                Primary Care Provider Office Phone # Fax #    Abel Gene Fernandez [phone] [address]  [place] [address]        Equal Access to Services     POLO GOLDMAN : Hadii aad ku hadasho Soomaali, waaxda luqadaha, qaybta kaalmada adeegyada, waxay idiin hayaan adeeg kharash la'gretan . So [facility] [phone].    ATENCIÓN: Si habla español, tiene a hernández disposición servicios gratuitos de asistencia lingüística. Llame al [phone].    We comply with applicable federal civil rights laws and [place] laws. We do not discriminate on the basis of race, color, national origin, age, disability, sex, sexual orientation, or gender identity.            Thank you!     Thank you for choosing [facility]  for your care. Our goal is always to provide you with excellent care. Hearing back from our patients is one way we can continue to improve our services. Please take a few minutes to complete the written survey that you may receive in the mail after your visit with us. Thank you!             Your Updated Medication List - Protect others around you: Learn how to safely use, store and throw away your medicines at www.disposemymeds.org.          This list is accurate as of 12/6/18  2:55 PM.  Always use your most recent med list.                   Brand Name Dispense Instructions for use Diagnosis    * ASCENSIA BREEZE MONITOR Kit     1 kit    1 each daily.    Impaired fasting glucose       * blood glucose monitoring meter device kit    NO BRAND SPECIFIED    1 kit    Use to test blood sugar 4 times daily or as directed. Increase frequency of monitoring due to A1C of 11.9.    Type 2 diabetes mellitus with hyperosmolarity without coma,  without long-term current use of insulin (H)       ASPIRIN PO      Take 325 mg by mouth daily        atorvastatin 40 MG tablet    LIPITOR    90 tablet    Take 1 tablet (40 mg) by mouth daily    Hyperlipidemia LDL goal <100       * blood glucose monitoring test strip    NO BRAND SPECIFIED    300 each    Pt. Is testing 3 times daily ### DO NOT FILL NOW.  Please update patient's profile to reflect additional refills.  ####    Type 2 diabetes mellitus with hyperosmolarity without coma, without long-term current use of insulin (H)       * blood glucose monitoring test strip    NO BRAND SPECIFIED    400 strip    Use to test blood sugars 4 times daily or as directed    Type 2 diabetes mellitus with hyperosmolarity without coma, without long-term current use of insulin (H)       citalopram 20 MG tablet    celeXA    90 tablet    Take 1 tablet (20 mg) by mouth daily    Major depressive disorder, single episode in full remission (H)       finasteride 5 MG tablet    PROSCAR    90 tablet    Take 1 tablet (5 mg) by mouth daily    Urinary retention       KRILL OIL PO      None Entered        liraglutide 18 MG/3ML solution    VICTOZA    9 mL    0.6 mg SQ daily times two weeks then 1.2 mg SQ daily times two weeks then 1.8 mg SQ daily times thereafter indefinitely    Type 2 diabetes mellitus with hyperosmolarity without coma, without long-term current use of insulin (H)       lisinopril 20 MG tablet    PRINIVIL/ZESTRIL    90 tablet    Take 1 tablet (20 mg) by mouth daily    Essential hypertension with goal blood pressure less than 130/80       metFORMIN 1000 MG tablet    GLUCOPHAGE    180 tablet    TAKE ONE TABLET BY MOUTH TWICE DAILY    Type 2 diabetes mellitus with hyperosmolarity without coma, without long-term current use of insulin (H)       MULTIPLE VITAMINS CAPS   OR          1 capsule qd        tamsulosin 0.4 MG capsule    FLOMAX    90 capsule    Take 1 capsule (0.4 mg) by mouth daily    Urinary retention       thin lancets     NO BRAND SPECIFIED    3 Box    1 each 3 times daily    Type 2 diabetes mellitus with hyperosmolarity without coma, without long-term current use of insulin (H)       zolpidem 10 MG tablet    AMBIEN    10 tablet    Take 1 tablet (10 mg) by mouth nightly as needed for sleep    Primary hypersomnia       * Notice:  This list has 4 medication(s) that are the same as other medications prescribed for you. Read the directions carefully, and ask your doctor or other care provider to review them with you.

## 2018-12-08 ENCOUNTER — MYC MEDICAL ADVICE (OUTPATIENT)
Dept: OTHER | Facility: CLINIC | Age: 68
End: 2018-12-08

## 2018-12-08 DIAGNOSIS — E11.9 DIABETES MELLITUS WITHOUT COMPLICATION (H): Primary | ICD-10-CM

## 2018-12-10 NOTE — TELEPHONE ENCOUNTER
Prior Authorization Retail Medication Request    Medication/Dose:   ICD code (if different than what is on RX):    Previously Tried and Failed:  metFORMIN (GLUCOPHAGE) 1000 MG tablet twice daily., once daily,  Rationale:  A1c not controlled is at 12.1    Insurance Name:  [address] [place] [address] [employer]  Insurance ID:  [id number]       Pharmacy Information (if different than what is on RX)  Name:    Phone:

## 2018-12-10 NOTE — TELEPHONE ENCOUNTER
This PA request was submitted to the insurance by the Central Prior Authorization Team.  If you have any questions in regards to this request, we can be reached at [phone].     Thanks    PA Initiation - Tier Exception Request     Medication: liraglutide (VICTOZA) 18 MG/3ML solution  Insurance Company: [employer] [place] - Phone [phone] Fax [phone]  Pharmacy Filling the Rx: [id number] - [facility], [place] - [address] [place] [address]  Filling Pharmacy Phone: [phone]  Filling Pharmacy Fax:    Start Date: 12/10/2018

## 2018-12-13 NOTE — TELEPHONE ENCOUNTER
PRIOR AUTHORIZATION DENIED - Tier Exception Denied     Medication: liraglutide (VICTOZA) 18 MG/3ML solution - DENIED     Denial Date: 12/11/2018    Denial Rational:         Appeal Information: Appeal not applicable at this time. You may submit another request in 2019

## 2018-12-21 NOTE — TELEPHONE ENCOUNTER
Medication Appeal Initiation    We have initiated an appeal for the requested medication:  Medication: liraglutide (VICTOZA) 18 MG/3ML solution - DENIED   Appeal Start Date:  12/21/2018  Insurance Company: [employer] [place] - Phone [phone] Fax [phone]  Comments:  Appeal initiated on 12/21/2018; letter of medical necessity faxed to insurance.  *Please note that appeals can take up to 30 days*

## 2019-01-02 DIAGNOSIS — E11.00 TYPE 2 DIABETES MELLITUS WITH HYPEROSMOLARITY WITHOUT COMA, WITHOUT LONG-TERM CURRENT USE OF INSULIN (H): ICD-10-CM

## 2019-01-02 RX ORDER — BLOOD-GLUCOSE METER
EACH MISCELLANEOUS
Qty: 1 KIT | Refills: 0 | Status: SHIPPED | OUTPATIENT
Start: 2019-01-02 | End: 2019-05-01

## 2019-01-03 ENCOUNTER — ALLIED HEALTH/NURSE VISIT (OUTPATIENT)
Dept: EDUCATION SERVICES | Facility: CLINIC | Age: 69
End: 2019-01-03
Payer: COMMERCIAL

## 2019-01-03 VITALS — WEIGHT: 205.2 LBS | BODY MASS INDEX: 28.62 KG/M2

## 2019-01-03 DIAGNOSIS — E11.9 TYPE 2 DIABETES, HBA1C GOAL < 7% (H): Primary | ICD-10-CM

## 2019-01-03 PROCEDURE — G0108 DIAB MANAGE TRN  PER INDIV: HCPCS

## 2019-01-03 NOTE — PATIENT INSTRUCTIONS
Care Plan:  Meal Plan Recommendation: eat 3 meals a day, have small snacks between meals, if needed, use portion control, use plate planning method, Aim for 30-60 grams of carb at meals and 15-30 grams carb at snacks and choose heart healthy foods  Exercise / activity plan: continue with your activity  Check blood sugars 4 times a day as you have been testing, once a week add a 2 hour after the start of meals (rotate between breakfast, lunch and supper)    Follow up:  Follow-up diabetes education appointment scheduled on 1/31/19 at 10:30am.      Bring blood glucose meter and logbook with you to all doctor and follow-up appointments.     [facility] for the [facility]:  For Your Diabetes or Nutrition Education Appointments Call:  [phone]   For Diabetes or Nutrition Related Questions Call or Email:   [phone]  [email or web address]  Fax: [phone]   If you need a medication refill please contact your pharmacy. Please allow 3 business days for your refills to be completed.     Instructions for emailing the Diabetes Educators    If you need to communicate a non-urgent message to a Diabetes Educator via email, please send to [email or web address].    Please follow the following email guidelines:    Subject line: Secure: your clinic name (example: Secure: [facility])  In the email please include: First name, middle initial, last name and date of birth.    We will be in touch with you within one (1) business day.

## 2019-01-03 NOTE — PROGRESS NOTES
"Diabetes Self-Management Education & Support    Diabetes Education Self Management & Training    SUBJECTIVE/OBJECTIVE:  Presents for: Individual review  Accompanied by: Self, Spouse  Diabetes education in the past 24mo: No  Focus of Visit: Monitoring, Taking Medication, Healthy Eating, Assistance w/ making life changes  Diabetes type: Type 2  Disease course: Worsening  How confident are you filling out medical forms by yourself:: Extremely  Transportation concerns: No  Other concerns:: unable to start Victoza due to cost, $500 a month, wants to make healthy changes to improve diabetes, having surgery in february for ankle fusion.  Cultural Influences/Ethnic Background:  American    Diabetes Symptoms & Complications  Blurred vision: Yes  Fatigue: Yes  Neuropathy: Yes(a little tingling sometimes in the feet)  Foot ulcerations: No  Polydipsia: Yes  Polyphagia: No  Polyuria: Yes  Visual change: No  Weakness: No  Weight loss: No  Slow healing wounds: No  Recent Infection(s): No  Other: No  Symptom course: Stable  Weight trend: Stable  Autonomic neuropathy: No  CVA: No  Heart disease: No  Nephropathy: No  Peripheral neuropathy: No  Peripheral Vascular Disease: No  Retinopathy: No  Sexual dysfunction: No    Patient Problem List and Family Medical History reviewed for relevant medical history, current medical status, and diabetes risk factors.    Vitals:  Wt 93.1 kg (205 lb 3.2 oz)   BMI 28.62 kg/m    Estimated body mass index is 28.62 kg/m  as calculated from the following:    Height as of 12/21/17: 1.803 m (5' 11\").    Weight as of this encounter: 93.1 kg (205 lb 3.2 oz).   Last 3 BP:   BP Readings from Last 3 Encounters:   12/06/18 118/79   12/21/17 118/76   07/18/17 110/72       History   Smoking Status     Never Smoker   Smokeless Tobacco     Never Used       Labs:  Lab Results   Component Value Date    A1C 12.1 12/06/2018     Lab Results   Component Value Date     12/06/2018     Lab Results   Component Value " Date    LDL 60 12/04/2018     HDL Cholesterol   Date Value Ref Range Status   12/04/2018 32 (L) >39 mg/dL Final   ]  GFR Estimate   Date Value Ref Range Status   12/06/2018 75 >60 mL/min/1.7m2 Final     Comment:     Non  GFR Calc     GFR Estimate If Black   Date Value Ref Range Status   12/06/2018 >90 >60 mL/min/1.7m2 Final     Comment:      GFR Calc     Lab Results   Component Value Date    CR 0.99 12/06/2018     No results found for: MICROALBUMIN    Healthy Eating  Cultural/Yarsani diet restrictions?: No  Meal planning: Avoiding sweets, Carbohydrate counting, Heart healthy, Smaller portions  Meals include: Breakfast, Lunch, Dinner, Snacks  Breakfast: shake- milk, 1/2 banana, greens, seeds, protein powder  Lunch: salad  Dinner: variety  Snacks: popcorn, nuts  Beverages: Water, Coffee, Milk, Diet soda  Has patient met with a dietitian in the past?: No    Being Active  Being Active Assessed Today: Yes  Exercise:: Yes  Days per week of moderate to strenuous exercise (like a brisk walk): 1  On average, minutes per day of exercise at this level: 60  How intense was your typical exercise? : Moderate (like brisk walking)(yoga once a week, sometimes twice a week)  Exercise Minutes per Week: 60  Barrier to exercise: Physical limitation(failed ankle fusion)    Monitoring  Monitoring Assessed Today: Yes  Did patient bring glucose meter to appointment? : Yes  Blood Glucose Meter: One Touch  Home Glucose (Sugar) Monitoring: 3-4 times per day- most are before meals but some are after meals  Blood glucose trend: Decreasing steadily  Low Glucose Range (mg/dL): 70-90  High Glucose Range (mg/dL): >200  Overall Range (mg/dL): 140-180        Taking Medications  Diabetes Medication(s)     Biguanides Sig    metFORMIN (GLUCOPHAGE) 1000 MG tablet TAKE ONE TABLET BY MOUTH TWICE DAILY    Incretin Mimetic Agents (GLP-1 Receptor Agonists) Sig    liraglutide (VICTOZA) 18 MG/3ML solution 0.6 mg SQ daily times  two weeks then 1.2 mg SQ daily times two weeks then 1.8 mg SQ daily times thereafter indefinitely did not start due to cost          Taking Medication Assessed Today: Yes  Current Treatments: Diet, Oral Agent (monotherapy)  Problems taking diabetes medications regularly?: No  Diabetes medication side effects?: No  Treatment Compliance: All of the time    Problem Solving  Problem Solving Assessed Today: Yes  Hypoglycemia Frequency: Rarely  Hypoglycemia Treatment: Other food  Patient carries a carbohydrate source: No  Medical alert: No  Severe weather/disaster plan for diabetes management?: No  DKA prevention plan?: No  Sick day plan for diabetes management?: (P) No    Hypoglycemia symptoms  Confusion: No  Dizziness or Light-Headedness: No  Headaches: No  Hunger: No  Mood changes: No  Nervousness/Anxiety: No  Sleepiness: No  Speech difficulty: No  Sweats: No  Tremors: No    Hypoglycemia Complications  Blackouts: No  Hospitalization: No  Nocturnal hypoglycemia: No  Required assistance: No  Required glucagon injection: No  Seizures: No    Reducing Risks  Reducing Risks Assessed Today: Yes  Diabetes Risks: Age over 45 years, Sedentary Lifestyle, Family History, Hyperlipidemia  CAD Risks: Family history, Hypertension, Male sex, Obesity, Diabetes Mellitus, Sedentary lifestyle, Dyslipidemia  Has dilated eye exam at least once a year?: No  Sees dentist every 6 months?: Yes  Sees podiatrist (foot doctor)?: No    Healthy Coping  Healthy Coping Assessed Today: Yes  Emotional response to diabetes: Ready to learn  Informal Support system:: Children, Family, Friends, Spouse  Stage of change: PREPARATION (Decided to change - considering how)  Difficulty affording diabetes management supplies?: Yes  Support resources: In-person Offerings  Patient Activation Measure Survey Score:  No flowsheet data found.    ASSESSMENT:  Patient is not able to afford the Victoza at this time, he would like to see if additional diet and lifestyle  changes will help instead of alternative medication. Discussed the pros and cons of waiting to add medication especially with planned surgery in mid February. Patient is aware and prefers to wait on additional medication at this time. This is a reasonable request considering his glucose is improving with an overall average since 12/10/18 of 168mg/dl and continues to improve with the last 2 weeks.    Goals Addressed as of 1/3/2019 at 2:41 PM       Healthy Eating (pt-stated)     Added 1/3/19 by Ara Montano RN     My Goal: I will watch portions at meals    What I need to meet my goal: use plate planner and meal plan ideas    I plan to meet my goal by this date: 1/31/19            Patient's most recent   Lab Results   Component Value Date    A1C 12.1 12/06/2018    is not meeting goal of <7.0    INTERVENTION:   Diabetes knowledge and skills assessment:     Patient is knowledgeable in diabetes management concepts related to: Monitoring and Healthy Coping    Patient needs further education on the following diabetes management concepts: Healthy Eating, Being Active, Taking Medication and Problem Solving    Based on learning assessment above, most appropriate setting for further diabetes education would be: Group class or Individual setting.    Education provided today on:  AADE Self-Care Behaviors:  Diabetes Pathophysiology  Healthy Eating: carbohydrate counting, consistency in amount, composition, and timing of food intake, weight reduction, portion control and plate planning method  Being Active: relationship to blood glucose and precautions to take  Monitoring: individual blood glucose targets and frequency of monitoring  Taking Medication: action of prescribed medication, side effects of prescribed medications and when to take medications  Problem Solving: when to call health care provider  Reducing Risks: A1C - goals, relating to blood glucose levels, how often to check    Opportunities for ongoing education and  support in diabetes-self management were discussed.    Pt verbalized understanding of concepts discussed and recommendations provided today.       Education Materials Provided:  BG Log Sheet, Carbohydrate Counting, My Plate Planner and meal and snack ideas    PLAN:  See Patient Instructions for co-developed, patient-stated behavior change goals.  AVS printed and provided to patient today. See Follow-Up section for recommended follow-up.    Ara Montano RN,CDE   Time Spent: 60 minutes  Encounter Type: Individual    Any diabetes medication dose changes were made via the CDE Protocol and Collaborative Practice Agreement with the patient's referring provider. A copy of this encounter was shared with the provider.

## 2019-01-03 NOTE — LETTER
"    1/3/2019         RE: Dino Daniels  [address]  [facility] [address]        Dear Colleague,    Thank you for referring your patient, Dino Daniels, to the [facility]. Please see a copy of my visit note below.    Diabetes Self-Management Education & Support    Diabetes Education Self Management & Training    SUBJECTIVE/OBJECTIVE:  Presents for: Individual review  Accompanied by: Self, Spouse  Diabetes education in the past 24mo: No  Focus of Visit: Monitoring, Taking Medication, Healthy Eating, Assistance w/ making life changes  Diabetes type: Type 2  Disease course: Worsening  How confident are you filling out medical forms by yourself:: Extremely  Transportation concerns: No  Other concerns:: unable to start Victoza due to cost, $500 a month, wants to make healthy changes to improve diabetes, having surgery in february for ankle fusion.  Cultural Influences/Ethnic Background:  American    Diabetes Symptoms & Complications  Blurred vision: Yes  Fatigue: Yes  Neuropathy: Yes(a little tingling sometimes in the feet)  Foot ulcerations: No  Polydipsia: Yes  Polyphagia: No  Polyuria: Yes  Visual change: No  Weakness: No  Weight loss: No  Slow healing wounds: No  Recent Infection(s): No  Other: No  Symptom course: Stable  Weight trend: Stable  Autonomic neuropathy: No  CVA: No  Heart disease: No  Nephropathy: No  Peripheral neuropathy: No  Peripheral Vascular Disease: No  Retinopathy: No  Sexual dysfunction: No    Patient Problem List and Family Medical History reviewed for relevant medical history, current medical status, and diabetes risk factors.    Vitals:  Wt 93.1 kg (205 lb 3.2 oz)   BMI 28.62 kg/m     Estimated body mass index is 28.62 kg/m  as calculated from the following:    Height as of 12/21/17: 1.803 m (5' 11\").    Weight as of this encounter: 93.1 kg (205 lb 3.2 oz).   Last 3 BP:   BP Readings from Last 3 Encounters:   12/06/18 118/79   12/21/17 118/76 "   07/18/17 110/72       History   Smoking Status     Never Smoker   Smokeless Tobacco     Never Used       Labs:  Lab Results   Component Value Date    A1C 12.1 12/06/2018     Lab Results   Component Value Date     12/06/2018     Lab Results   Component Value Date    LDL 60 12/04/2018     HDL Cholesterol   Date Value Ref Range Status   12/04/2018 32 (L) >39 mg/dL Final   ]  GFR Estimate   Date Value Ref Range Status   12/06/2018 75 >60 mL/min/1.7m2 Final     Comment:     Non  GFR Calc     GFR Estimate If Black   Date Value Ref Range Status   12/06/2018 >90 >60 mL/min/1.7m2 Final     Comment:      GFR Calc     Lab Results   Component Value Date    CR 0.99 12/06/2018     No results found for: MICROALBUMIN    Healthy Eating  Cultural/[place]ondoism diet restrictions?: No  Meal planning: Avoiding sweets, Carbohydrate counting, Heart healthy, Smaller portions  Meals include: Breakfast, Lunch, Dinner, Snacks  Breakfast: shake- milk, 1/2 banana, greens, seeds, protein powder  Lunch: salad  Dinner: variety  Snacks: popcorn, nuts  Beverages: Water, Coffee, Milk, Diet soda  Has patient met with a dietitian in the past?: No    Being Active  Being Active Assessed Today: Yes  Exercise:: Yes  Days per week of moderate to strenuous exercise (like a brisk walk): 1  On average, minutes per day of exercise at this level: 60  How intense was your typical exercise? : Moderate (like brisk walking)(yoga once a week, sometimes twice a week)  Exercise Minutes per Week: 60  Barrier to exercise: Physical limitation(failed ankle fusion)    Monitoring  Monitoring Assessed Today: Yes  Did patient bring glucose meter to appointment? : Yes  Blood Glucose Meter: One Touch  Home Glucose (Sugar) Monitoring: 3-4 times per day- most are before meals but some are after meals  Blood glucose trend: Decreasing steadily  Low Glucose Range (mg/dL): 70-90  High Glucose Range (mg/dL): >200  Overall Range (mg/dL):  140-180        Taking Medications  Diabetes Medication(s)     Biguanides Sig    metFORMIN (GLUCOPHAGE) 1000 MG tablet TAKE ONE TABLET BY MOUTH TWICE DAILY    Incretin Mimetic Agents (GLP-1 Receptor Agonists) Sig    liraglutide (VICTOZA) 18 MG/3ML solution 0.6 mg SQ daily times two weeks then 1.2 mg SQ daily times two weeks then 1.8 mg SQ daily times thereafter indefinitely did not start due to cost          Taking Medication Assessed Today: Yes  Current Treatments: Diet, Oral Agent (monotherapy)  Problems taking diabetes medications regularly?: No  Diabetes medication side effects?: No  Treatment Compliance: All of the time    Problem Solving  Problem Solving Assessed Today: Yes  Hypoglycemia Frequency: Rarely  Hypoglycemia Treatment: Other food  Patient carries a carbohydrate source: No  Medical alert: No  Severe weather/disaster plan for diabetes management?: No  DKA prevention plan?: No  Sick day plan for diabetes management?: (P) No    Hypoglycemia symptoms  Confusion: No  Dizziness or Light-Headedness: No  Headaches: No  Hunger: No  Mood changes: No  Nervousness/Anxiety: No  Sleepiness: No  Speech difficulty: No  Sweats: No  Tremors: No    Hypoglycemia Complications  Blackouts: No  Hospitalization: No  Nocturnal hypoglycemia: No  Required assistance: No  Required glucagon injection: No  Seizures: No    Reducing Risks  Reducing Risks Assessed Today: Yes  Diabetes Risks: Age over 45 years, Sedentary Lifestyle, Family History, Hyperlipidemia  CAD Risks: Family history, Hypertension, Male sex, Obesity, Diabetes Mellitus, Sedentary lifestyle, Dyslipidemia  Has dilated eye exam at least once a year?: No  Sees dentist every 6 months?: Yes  Sees podiatrist (foot doctor)?: No    Healthy Coping  Healthy Coping Assessed Today: Yes  Emotional response to diabetes: Ready to learn  Informal Support system:: Children, Family, Friends, Spouse  Stage of change: PREPARATION (Decided to change - considering how)  Difficulty  affording diabetes management supplies?: Yes  Support resources: In-person Offerings  Patient Activation Measure Survey Score:  No flowsheet data found.    ASSESSMENT:  Patient is not able to afford the Victoza at this time, he would like to see if additional diet and lifestyle changes will help instead of alternative medication. Discussed the pros and cons of waiting to add medication especially with planned surgery in mid February. Patient is aware and prefers to wait on additional medication at this time. This is a reasonable request considering his glucose is improving with an overall average since 12/10/18 of 168mg/dl and continues to improve with the last 2 weeks.    Goals Addressed as of 1/3/2019 at 2:41 PM       Healthy Eating (pt-stated)     Added 1/3/19 by Ara Montano RN     My Goal: I will watch portions at meals    What I need to meet my goal: use plate planner and meal plan ideas    I plan to meet my goal by this date: 1/31/19            Patient's most recent   Lab Results   Component Value Date    A1C 12.1 12/06/2018    is not meeting goal of <7.0    INTERVENTION:   Diabetes knowledge and skills assessment:     Patient is knowledgeable in diabetes management concepts related to: Monitoring and Healthy Coping    Patient needs further education on the following diabetes management concepts: Healthy Eating, Being Active, Taking Medication and Problem Solving    Based on learning assessment above, most appropriate setting for further diabetes education would be: Group class or Individual setting.    Education provided today on:  AADE Self-Care Behaviors:  Diabetes Pathophysiology  Healthy Eating: carbohydrate counting, consistency in amount, composition, and timing of food intake, weight reduction, portion control and plate planning method  Being Active: relationship to blood glucose and precautions to take  Monitoring: individual blood glucose targets and frequency of monitoring  Taking Medication:  action of prescribed medication, side effects of prescribed medications and when to take medications  Problem Solving: when to call health care provider  Reducing Risks: A1C - goals, relating to blood glucose levels, how often to check    Opportunities for ongoing education and support in diabetes-self management were discussed.    Pt verbalized understanding of concepts discussed and recommendations provided today.       Education Materials Provided:  BG Log Sheet, Carbohydrate Counting, My Plate Planner and meal and snack ideas    PLAN:  See Patient Instructions for co-developed, patient-stated behavior change goals.  AVS printed and provided to patient today. See Follow-Up section for recommended follow-up.    Ara Montano RN,CDE   Time Spent: 60 minutes  Encounter Type: Individual    Any diabetes medication dose changes were made via the CDE Protocol and Collaborative Practice Agreement with the patient's referring provider. A copy of this encounter was shared with the provider.

## 2019-01-04 NOTE — TELEPHONE ENCOUNTER
Called the insurance to check on the status of the appeal. However, it seems they dismissed the case without reviewing it. I told them that we are going to re-fax the request to them today for an urgent review.

## 2019-01-07 NOTE — TELEPHONE ENCOUNTER
PA Denied  Please schedule patient to see Dr Fernandez to discuss other medication options.  Marissa Ellis, RN, BSN

## 2019-01-08 NOTE — TELEPHONE ENCOUNTER
Called and left the patient a voicemail to call back and schedule an appointment with Dr. Fernandez to discuss medication options, PA was denied.

## 2019-01-08 NOTE — TELEPHONE ENCOUNTER
Patient called back and stated he is scheduled to see Dr. Fernandez both in February and in March for appointments and is hoping that this could just be addressed at either of those. Stated he has started the diabetic education appointments that Dr. Fernandez referred him for and that those have been going very well and his labs that have been taken at those appointments have improved greatly. Patient also stated that if Dr. Fernandez didn't want him waiting until either of his upcoming appointments that he remembers the information from the last visit where Dr. Fernandez talked about the other medication options that were available to him. Stated that if Dr. Fernandez didn't want him waiting he was wondering if a prescription could just be sent to his pharmacy for the other medication that was talked about. Routing to Dr. Fernandez's nurse for assistance.

## 2019-01-10 ENCOUNTER — TELEPHONE (OUTPATIENT)
Dept: EDUCATION SERVICES | Facility: CLINIC | Age: 69
End: 2019-01-10

## 2019-01-10 NOTE — TELEPHONE ENCOUNTER
Patient wants to speak with Ara in regards to the medication you had mentioned at last appointment.  Please call to discuss.

## 2019-01-10 NOTE — TELEPHONE ENCOUNTER
Patient states Victoza appeal was denied and his PCP prescribed another medication, not sure if he wants to take it since he is working on his diet and  BG's continue to improve.  Offered to download meter early next week to evaluate glucose. Patient agrees , appointment scheduled.  Ara Montano RN,CDE

## 2019-01-14 DIAGNOSIS — F51.11 PRIMARY HYPERSOMNIA: ICD-10-CM

## 2019-01-15 ENCOUNTER — ALLIED HEALTH/NURSE VISIT (OUTPATIENT)
Dept: EDUCATION SERVICES | Facility: CLINIC | Age: 69
End: 2019-01-15
Payer: COMMERCIAL

## 2019-01-15 DIAGNOSIS — E11.9 TYPE 2 DIABETES, HBA1C GOAL < 7% (H): Primary | ICD-10-CM

## 2019-01-15 RX ORDER — ZOLPIDEM TARTRATE 10 MG/1
TABLET ORAL
Qty: 10 TABLET | Refills: 4 | Status: SHIPPED | OUTPATIENT
Start: 2019-01-15 | End: 2019-12-18

## 2019-01-15 NOTE — Clinical Note
,Please see my note.  Glucose is in goal with metformin and lifestyle changes.  He prefers not to start the Januvia which I think is reasonable at this time.  Let me know if have other thoughts.  He has follow up with you early February. Ara Montano RN,CDE

## 2019-01-15 NOTE — PROGRESS NOTES
Diabetes Follow-up    Subjective/Objective:    Dino Daniels stopped in for a meter download for review.     Comments/concerns:  Working on diet and lifestyle changes, BG's have been improving, wondering if the new medication ( Januvia) is needed or can he continue with Metformin and lifestyle changes. Does not want to take medication if it's not needed and has concerns of paying for additional medication.     Diabetes is being managed with   Lifestyle (diet/activity), Diabetes Medications   Diabetes Medication(s)     Biguanides Sig    metFORMIN (GLUCOPHAGE) 1000 MG tablet TAKE ONE TABLET BY MOUTH TWICE DAILY    Dipeptidyl Peptidase-4 (DPP-4) Inhibitors Sig    sitagliptin (JANUVIA) 100 MG tablet Take 1 tablet (100 mg) by mouth daily-Has not yet started taking this medication          BG/Food Log:       Assessment:  Glucose continues to improve with Metformin and lifestyle changes.  Overall average for the last 2 weeks is 139mg/dl, premeal testing and some 2 hours post meal testing.  Previous month glucose average was 168mg/dl.  Anticipate improved a1c next month.       Plan/Response:  Recommend patient continue with the metformin, diet and lifestyle changes. Hold off on the Januvia at this time. He has follow up in 2 weeks before his surgery. Will reevaluate at that time.     Ara Montano RN,CDE     Any diabetes medication dose changes were made via the CDE Protocol and Collaborative Practice Agreement with the patient's referring provider. A copy of this encounter was shared with the provider.

## 2019-01-15 NOTE — LETTER
1/15/2019         RE: Dino Daniels  [address]  [facility] [address]        Dear Colleague,    Thank you for referring your patient, Dino Daniels, to the [facility]. Please see a copy of my visit note below.    Diabetes Follow-up    Subjective/Objective:    Dino Daniels stopped in for a meter download for review.     Comments/concerns:  Working on diet and lifestyle changes, BG's have been improving, wondering if the new medication ( Januvia) is needed or can he continue with Metformin and lifestyle changes. Does not want to take medication if it's not needed and has concerns of paying for additional medication.     Diabetes is being managed with   Lifestyle (diet/activity), Diabetes Medications   Diabetes Medication(s)     Biguanides Sig    metFORMIN (GLUCOPHAGE) 1000 MG tablet TAKE ONE TABLET BY MOUTH TWICE DAILY    Dipeptidyl Peptidase-4 (DPP-4) Inhibitors Sig    sitagliptin (JANUVIA) 100 MG tablet Take 1 tablet (100 mg) by mouth daily-Has not yet started taking this medication          BG/Food Log:       Assessment:  Glucose continues to improve with Metformin and lifestyle changes.  Overall average for the last 2 weeks is 139mg/dl, premeal testing and some 2 hours post meal testing.  Previous month glucose average was 168mg/dl.  Anticipate improved a1c next month.       Plan/Response:  Recommend patient continue with the metformin, diet and lifestyle changes. Hold off on the Januvia at this time. He has follow up in 2 weeks before his surgery. Will reevaluate at that time.     Ara Montano RN,CDE     Any diabetes medication dose changes were made via the CDE Protocol and Collaborative Practice Agreement with the patient's referring provider. A copy of this encounter was shared with the provider.

## 2019-01-29 ENCOUNTER — ALLIED HEALTH/NURSE VISIT (OUTPATIENT)
Dept: EDUCATION SERVICES | Facility: CLINIC | Age: 69
End: 2019-01-29
Payer: COMMERCIAL

## 2019-01-29 VITALS — WEIGHT: 210.4 LBS | BODY MASS INDEX: 29.34 KG/M2

## 2019-01-29 DIAGNOSIS — E11.9 TYPE 2 DIABETES, HBA1C GOAL < 7% (H): Primary | ICD-10-CM

## 2019-01-29 PROCEDURE — G0108 DIAB MANAGE TRN  PER INDIV: HCPCS

## 2019-01-29 NOTE — LETTER
"    1/29/2019         RE: Dino Daniels  [address]  [facility] [facility] [address]        Dear Colleague,    Thank you for referring your patient, Dino Daniels, to the [facility]. Please see a copy of my visit note below.    Diabetes Self-Management Education & Support    Diabetes Education Self Management & Training    SUBJECTIVE/OBJECTIVE:  Presents for: Individual review  Accompanied by: Self  Diabetes education in the past 24mo: Yes  Focus of Visit: Monitoring, Taking Medication  Diabetes type: Type 2  Disease course: Improving  How confident are you filling out medical forms by yourself:: Extremely  Diabetes management related comments/concerns: having surgyery in a couple weeks, wants to know if glucose is good,   Transportation concerns: No  Other concerns:: None  Cultural Influences/Ethnic Background:  American    Diabetes Symptoms & Complications  Blurred vision: Yes  Fatigue: Yes  Neuropathy: Yes(a little tingling sometimes in the feet)  Foot ulcerations: No  Polydipsia: No  Polyphagia: No  Polyuria: No  Visual change: No  Weakness: No  Weight loss: No  Slow healing wounds: No  Recent Infection(s): No  Other: No  Symptom course: Stable  Weight trend: Stable  Autonomic neuropathy: No  CVA: No  Heart disease: No  Nephropathy: No  Peripheral neuropathy: No  Peripheral Vascular Disease: No  Retinopathy: No  Sexual dysfunction: No    Patient Problem List and Family Medical History reviewed for relevant medical history, current medical status, and diabetes risk factors.    Vitals:  Wt 95.4 kg (210 lb 6.4 oz)   BMI 29.34 kg/m     Estimated body mass index is 29.34 kg/m  as calculated from the following:    Height as of 12/21/17: 1.803 m (5' 11\").    Weight as of this encounter: 95.4 kg (210 lb 6.4 oz).   Last 3 BP:   BP Readings from Last 3 Encounters:   12/06/18 118/79   12/21/17 118/76   07/18/17 110/72       History   Smoking Status     Never Smoker   Smokeless Tobacco "     Never Used       Labs:  Lab Results   Component Value Date    A1C 12.1 12/06/2018     Lab Results   Component Value Date     12/06/2018     Lab Results   Component Value Date    LDL 60 12/04/2018     HDL Cholesterol   Date Value Ref Range Status   12/04/2018 32 (L) >39 mg/dL Final   ]  GFR Estimate   Date Value Ref Range Status   12/06/2018 75 >60 mL/min/1.7m2 Final     Comment:     Non  GFR Calc     GFR Estimate If Black   Date Value Ref Range Status   12/06/2018 >90 >60 mL/min/1.7m2 Final     Comment:      GFR Calc     Lab Results   Component Value Date    CR 0.99 12/06/2018     No results found for: MICROALBUMIN    Healthy Eating  Healthy Eating Assessed Today: Yes  Cultural/Denominational diet restrictions?: No  Patient on a regular basis: Eats 3 meals a day  Meal planning: Smaller portions, Carbohydrate counting  Meals include: Breakfast, Lunch, Dinner, Snacks    Being Active  Being Active Assessed Today: No    Monitoring  Monitoring Assessed Today: Yes  Did patient bring glucose meter to appointment? : Yes  Blood Glucose Meter: One Touch  Home Glucose (Sugar) Monitoring: 3-4 times per day  Blood glucose trend: Decreasing steadily  Low Glucose Range (mg/dL): 70-90  High Glucose Range (mg/dL): 180-200  Overall Range (mg/dL): 130-140        Taking Medications  Diabetes Medication(s)     Biguanides Sig    metFORMIN (GLUCOPHAGE) 1000 MG tablet TAKE ONE TABLET BY MOUTH TWICE DAILY          Taking Medication Assessed Today: Yes  Current Treatments: Diet, Oral Agent (monotherapy)  Problems taking diabetes medications regularly?: No  Diabetes medication side effects?: No  Treatment Compliance: All of the time    Problem Solving  Problem Solving Assessed Today: Yes  Hypoglycemia Frequency: Never      Reducing Risks  Reducing Risks Assessed Today: No    Healthy Coping  Healthy Coping Assessed Today: No  Patient Activation Measure Survey Score:  No flowsheet data  found.    ASSESSMENT:  Glucose continues to improve with diet,lifestyle changes and the metformin. Surgery is scheduled on 2/15/19,  Preoperative glucose in goal with on overall average of 141mg/dl for the last 30 days.      Patient's most recent   Lab Results   Component Value Date    A1C 12.1 12/06/2018    is not meeting goal of <7.0    INTERVENTION:   Diabetes knowledge and skills assessment:     Patient is knowledgeable in diabetes management concepts related to: Healthy Eating, Being Active, Monitoring, Taking Medication, Problem Solving, Reducing Risks and Healthy Coping    Patient needs further education on the following diabetes management concepts: Monitoring and Taking Medication    Based on learning assessment above, most appropriate setting for further diabetes education would be: Group class or Individual setting.    Education provided today on:  AADE Self-Care Behaviors:  Healthy Eating: consistency in amount, composition, and timing of food intake  Monitoring: individual blood glucose targets and frequency of monitoring  Taking Medication: action of prescribed medication    Opportunities for ongoing education and support in diabetes-self management were discussed.    Pt verbalized understanding of concepts discussed and recommendations provided today.       Education Materials Provided:  No new materials provided today    PLAN:  See Patient Instructions for co-developed, patient-stated behavior change goals.  AVS printed and provided to patient today. See Follow-Up section for recommended follow-up.    Ara Montano RN,CDE   Time Spent: 30 minutes  Encounter Type: Individual    Any diabetes medication dose changes were made via the CDE Protocol and Collaborative Practice Agreement with the patient's referring provider. A copy of this encounter was shared with the provider.

## 2019-01-29 NOTE — Clinical Note
Rebecca Chaudhry Craig will be seeing you next week for a preop. His daily blood sugars have been in goal for the last 30 days.Ara Montano RN,CDE

## 2019-01-29 NOTE — PROGRESS NOTES
"Diabetes Self-Management Education & Support    Diabetes Education Self Management & Training    SUBJECTIVE/OBJECTIVE:  Presents for: Individual review  Accompanied by: Self  Diabetes education in the past 24mo: Yes  Focus of Visit: Monitoring, Taking Medication  Diabetes type: Type 2  Disease course: Improving  How confident are you filling out medical forms by yourself:: Extremely  Diabetes management related comments/concerns: having surgyery in a couple weeks, wants to know if glucose is good,   Transportation concerns: No  Other concerns:: None  Cultural Influences/Ethnic Background:  American    Diabetes Symptoms & Complications  Blurred vision: Yes  Fatigue: Yes  Neuropathy: Yes(a little tingling sometimes in the feet)  Foot ulcerations: No  Polydipsia: No  Polyphagia: No  Polyuria: No  Visual change: No  Weakness: No  Weight loss: No  Slow healing wounds: No  Recent Infection(s): No  Other: No  Symptom course: Stable  Weight trend: Stable  Autonomic neuropathy: No  CVA: No  Heart disease: No  Nephropathy: No  Peripheral neuropathy: No  Peripheral Vascular Disease: No  Retinopathy: No  Sexual dysfunction: No    Patient Problem List and Family Medical History reviewed for relevant medical history, current medical status, and diabetes risk factors.    Vitals:  Wt 95.4 kg (210 lb 6.4 oz)   BMI 29.34 kg/m    Estimated body mass index is 29.34 kg/m  as calculated from the following:    Height as of 12/21/17: 1.803 m (5' 11\").    Weight as of this encounter: 95.4 kg (210 lb 6.4 oz).   Last 3 BP:   BP Readings from Last 3 Encounters:   12/06/18 118/79   12/21/17 118/76   07/18/17 110/72       History   Smoking Status     Never Smoker   Smokeless Tobacco     Never Used       Labs:  Lab Results   Component Value Date    A1C 12.1 12/06/2018     Lab Results   Component Value Date     12/06/2018     Lab Results   Component Value Date    LDL 60 12/04/2018     HDL Cholesterol   Date Value Ref Range Status "   12/04/2018 32 (L) >39 mg/dL Final   ]  GFR Estimate   Date Value Ref Range Status   12/06/2018 75 >60 mL/min/1.7m2 Final     Comment:     Non  GFR Calc     GFR Estimate If Black   Date Value Ref Range Status   12/06/2018 >90 >60 mL/min/1.7m2 Final     Comment:      GFR Calc     Lab Results   Component Value Date    CR 0.99 12/06/2018     No results found for: MICROALBUMIN    Healthy Eating  Healthy Eating Assessed Today: Yes  Cultural/Catholic diet restrictions?: No  Patient on a regular basis: Eats 3 meals a day  Meal planning: Smaller portions, Carbohydrate counting  Meals include: Breakfast, Lunch, Dinner, Snacks    Being Active  Being Active Assessed Today: No    Monitoring  Monitoring Assessed Today: Yes  Did patient bring glucose meter to appointment? : Yes  Blood Glucose Meter: One Touch  Home Glucose (Sugar) Monitoring: 3-4 times per day  Blood glucose trend: Decreasing steadily  Low Glucose Range (mg/dL): 70-90  High Glucose Range (mg/dL): 180-200  Overall Range (mg/dL): 130-140        Taking Medications  Diabetes Medication(s)     Biguanides Sig    metFORMIN (GLUCOPHAGE) 1000 MG tablet TAKE ONE TABLET BY MOUTH TWICE DAILY          Taking Medication Assessed Today: Yes  Current Treatments: Diet, Oral Agent (monotherapy)  Problems taking diabetes medications regularly?: No  Diabetes medication side effects?: No  Treatment Compliance: All of the time    Problem Solving  Problem Solving Assessed Today: Yes  Hypoglycemia Frequency: Never      Reducing Risks  Reducing Risks Assessed Today: No    Healthy Coping  Healthy Coping Assessed Today: No  Patient Activation Measure Survey Score:  No flowsheet data found.    ASSESSMENT:  Glucose continues to improve with diet,lifestyle changes and the metformin. Surgery is scheduled on 2/15/19,  Preoperative glucose in goal with on overall average of 141mg/dl for the last 30 days.      Patient's most recent   Lab Results   Component Value  Date    A1C 12.1 12/06/2018    is not meeting goal of <7.0    INTERVENTION:   Diabetes knowledge and skills assessment:     Patient is knowledgeable in diabetes management concepts related to: Healthy Eating, Being Active, Monitoring, Taking Medication, Problem Solving, Reducing Risks and Healthy Coping    Patient needs further education on the following diabetes management concepts: Monitoring and Taking Medication    Based on learning assessment above, most appropriate setting for further diabetes education would be: Group class or Individual setting.    Education provided today on:  AADE Self-Care Behaviors:  Healthy Eating: consistency in amount, composition, and timing of food intake  Monitoring: individual blood glucose targets and frequency of monitoring  Taking Medication: action of prescribed medication    Opportunities for ongoing education and support in diabetes-self management were discussed.    Pt verbalized understanding of concepts discussed and recommendations provided today.       Education Materials Provided:  No new materials provided today    PLAN:  See Patient Instructions for co-developed, patient-stated behavior change goals.  AVS printed and provided to patient today. See Follow-Up section for recommended follow-up.    Ara Montano RN,CDE   Time Spent: 30 minutes  Encounter Type: Individual    Any diabetes medication dose changes were made via the CDE Protocol and Collaborative Practice Agreement with the patient's referring provider. A copy of this encounter was shared with the provider.

## 2019-02-07 ENCOUNTER — HOSPITAL ENCOUNTER (OUTPATIENT)
Dept: LAB | Facility: CLINIC | Age: 69
Discharge: HOME | End: 2019-02-07
Attending: INTERNAL MEDICINE | Admitting: INTERNAL MEDICINE
Payer: COMMERCIAL

## 2019-02-07 ENCOUNTER — OFFICE VISIT (OUTPATIENT)
Dept: OTHER | Facility: CLINIC | Age: 69
End: 2019-02-07
Attending: INTERNAL MEDICINE
Payer: COMMERCIAL

## 2019-02-07 VITALS
WEIGHT: 207.6 LBS | OXYGEN SATURATION: 96 % | BODY MASS INDEX: 29.06 KG/M2 | RESPIRATION RATE: 16 BRPM | DIASTOLIC BLOOD PRESSURE: 89 MMHG | HEART RATE: 72 BPM | SYSTOLIC BLOOD PRESSURE: 137 MMHG | HEIGHT: 71 IN

## 2019-02-07 DIAGNOSIS — Z01.818 PREOP GENERAL PHYSICAL EXAM: Primary | ICD-10-CM

## 2019-02-07 DIAGNOSIS — K92.2 GASTROINTESTINAL HEMORRHAGE, UNSPECIFIED GASTROINTESTINAL HEMORRHAGE TYPE: ICD-10-CM

## 2019-02-07 DIAGNOSIS — E11.00 TYPE 2 DIABETES MELLITUS WITH HYPEROSMOLARITY WITHOUT COMA, WITHOUT LONG-TERM CURRENT USE OF INSULIN (H): ICD-10-CM

## 2019-02-07 DIAGNOSIS — Z01.818 PREOP GENERAL PHYSICAL EXAM: ICD-10-CM

## 2019-02-07 LAB
APTT PPP: 31 SEC (ref 22–37)
HBA1C MFR BLD: 8.2 % (ref 0–5.6)
HGB BLD-MCNC: 14 G/DL (ref 13.3–17.7)
INR PPP: 0.97 (ref 0.86–1.14)

## 2019-02-07 PROCEDURE — 85018 HEMOGLOBIN: CPT | Performed by: INTERNAL MEDICINE

## 2019-02-07 PROCEDURE — 93010 ELECTROCARDIOGRAM REPORT: CPT | Mod: ZP | Performed by: INTERNAL MEDICINE

## 2019-02-07 PROCEDURE — 83036 HEMOGLOBIN GLYCOSYLATED A1C: CPT | Performed by: INTERNAL MEDICINE

## 2019-02-07 PROCEDURE — 36415 COLL VENOUS BLD VENIPUNCTURE: CPT | Performed by: INTERNAL MEDICINE

## 2019-02-07 PROCEDURE — 99214 OFFICE O/P EST MOD 30 MIN: CPT | Mod: ZP | Performed by: INTERNAL MEDICINE

## 2019-02-07 PROCEDURE — 85730 THROMBOPLASTIN TIME PARTIAL: CPT | Performed by: INTERNAL MEDICINE

## 2019-02-07 PROCEDURE — G0463 HOSPITAL OUTPT CLINIC VISIT: HCPCS

## 2019-02-07 PROCEDURE — 85610 PROTHROMBIN TIME: CPT | Performed by: INTERNAL MEDICINE

## 2019-02-07 ASSESSMENT — MIFFLIN-ST. JEOR: SCORE: 1733.8

## 2019-02-07 NOTE — PROGRESS NOTES
[facility]  [address]  [place] [address]  [phone]  Dept: [phone]    PRE-OP EVALUATION:  Today's date: 2019    Dino Daniels (: 1950) presents for pre-operative evaluation assessment as requested by Tyson Cespedes.  He requires evaluation and anesthesia risk assessment prior to undergoing surgery/procedure for treatment of left ankle revision arthrodesis .    Proposed Surgery/ Procedure: left ankle revision arthrodesis  Date of Surgery/ Procedure: 2/15/19  Time of Surgery/ Procedure: 7:00am  Hospital/Surgical Facility: [facility]   Primary Physician: Abel Fernandez  Type of Anesthesia Anticipated: to be determined    Patient has a Health Care Directive or Living Will:  NO    1. NO - Do you have a history of heart attack, stroke, stent, bypass or surgery on an artery in the head, neck, heart or legs?  2. NO - Do you ever have any pain or discomfort in your chest?  3. NO - Do you have a history of  Heart Failure?  4. NO - Are you troubled by shortness of breath when: walking on the level, up a slight hill or at night?  5. NO - Do you currently have a cold, bronchitis or other respiratory infection?  6. NO - Do you have a cough, shortness of breath or wheezing?  7. NO - Do you sometimes get pains in the calves of your legs when you walk?  8. NO - Do you or anyone in your family have previous history of blood clots?  9. NO - Do you or does anyone in your family have a serious bleeding problem such as prolonged bleeding following surgeries or cuts?  10. NO - Have you ever had problems with anemia or been told to take iron pills?  11. NO - Have you had any abnormal blood loss such as black, tarry or bloody stools, or abnormal vaginal bleeding?  12. NO - Have you ever had a blood transfusion?  13. NO - Have you or any of your relatives ever had problems with anesthesia?  14. NO - Do you have sleep apnea, excessive snoring  or daytime drowsiness?  15. NO - Do you have any prosthetic heart valves?  16. NO - Do you have prosthetic joints?  17. NO - Is there any chance that you may be pregnant?      HPI:     HPI related to upcoming procedure: Two years ago the patient underwent left ankle fusion due to ongoing osteoarthritis. Subsequently, the patient noted he was having continuing pain. Further investigation revealed a fractured screw. The patietn wishes to proceed with elective revision so as to minimize pain      See problem list for active medical problems.  Problems all longstanding and stable, except as noted/documented.  See ROS for pertinent symptoms related to these conditions.                                                                                                                                                          .    MEDICAL HISTORY:     Patient Active Problem List    Diagnosis Date Noted     Major depressive disorder, single episode in full remission (H) 12/03/2015     Priority: Medium     Type 2 diabetes, HbA1c goal < 7% (H) 07/16/2012     Priority: Medium     HYPERLIPIDEMIA LDL GOAL <100 10/31/2010     Priority: Medium     Pain in joint, ankle and foot 10/26/2007     Priority: Medium      Past Medical History:   Diagnosis Date     Arthritis     knees, left ankle and right thumb     Bladder stone      DEPRESSIVE DISORDER NEC 10/9/2006    resolved in 2009     Hypertension      Mumps      Other and unspecified hyperlipidemia     abstracted 7/17/02.     Type II or unspecified type diabetes mellitus without mention of complication, not stated as uncontrolled      Past Surgical History:   Procedure Laterality Date     LASER HOLMIUM LITHOTRIPSY BLADDER  6/22/2012    Procedure: LASER HOLMIUM LITHOTRIPSY BLADDER;  Cystoscopy with Removal of Bladder Stone with Holmium Laser;  Surgeon: Chaz Cobb MD;  Location: [facility] OR     TESTICLE SURGERY       VASECTOMY       VASECTOMY       wisdom teeth[       Current  Outpatient Medications   Medication Sig Dispense Refill     ASPIRIN PO Take 325 mg by mouth daily       atorvastatin (LIPITOR) 40 MG tablet Take 1 tablet (40 mg) by mouth daily 90 tablet 3     blood glucose monitoring (NO BRAND SPECIFIED) test strip Use to test blood sugars 4 times daily or as directed 400 strip 3     blood glucose monitoring (NO BRAND SPECIFIED) test strip Pt. Is testing 3 times daily ### DO NOT FILL NOW.  Please update patient's profile to reflect additional refills.  #### 300 each 3     blood glucose monitoring (ONETOUCH VERIO) meter device kit Use to test blood sugar 4 times daily or as directed 1 kit 0     Blood Glucose Monitoring Suppl (HTPIA BREEZE MONITOR) KIT 1 each daily. 1 kit 0     citalopram (CELEXA) 20 MG tablet Take 1 tablet (20 mg) by mouth daily 90 tablet 3     finasteride (PROSCAR) 5 MG tablet Take 1 tablet (5 mg) by mouth daily 90 tablet 3     KRILL OIL OR None Entered       lisinopril (PRINIVIL/ZESTRIL) 20 MG tablet Take 1 tablet (20 mg) by mouth daily 90 tablet 3     metFORMIN (GLUCOPHAGE) 1000 MG tablet TAKE ONE TABLET BY MOUTH TWICE DAILY 180 tablet 3     MULTIPLE VITAMINS CAPS   OR 1 capsule qd        tamsulosin (FLOMAX) 0.4 MG capsule Take 1 capsule (0.4 mg) by mouth daily 90 capsule 3     thin (NO BRAND SPECIFIED) lancets 1 each 3 times daily 3 Box 3     zolpidem (AMBIEN) 10 MG tablet TAKE ONE TABLET BY MOUTH AT BEDTIME AS NEEDED FOR SLEEP  10 tablet 4     OTC products: Aspirin    Allergies   Allergen Reactions     No Known Drug Allergies       Latex Allergy: NO    Social History     Tobacco Use     Smoking status: Never Smoker     Smokeless tobacco: Never Used   Substance Use Topics     Alcohol use: Yes     Alcohol/week: 0.0 oz     Comment: rarely     History   Drug Use No       REVIEW OF SYSTEMS:   CONSTITUTIONAL: NEGATIVE for fever, chills, change in weight  INTEGUMENTARY/SKIN: NEGATIVE for worrisome rashes, moles or lesions  EYES: NEGATIVE for vision changes or  "irritation  ENT/MOUTH: NEGATIVE for ear, mouth and throat problems  RESP: NEGATIVE for significant cough or SOB  BREAST: NEGATIVE for masses, tenderness or discharge  CV: NEGATIVE for chest pain, palpitations or peripheral edema  GI: NEGATIVE for nausea, abdominal pain, heartburn, or change in bowel habits  : NEGATIVE for frequency, dysuria, or hematuria  MUSCULOSKELETAL:POSITIVE  for arthralgias intermittnetly in the left ankle limting ability to exercise or evne walk in some instances  NEURO: NEGATIVE for weakness, dizziness or paresthesias  ENDOCRINE: NEGATIVE for temperature intolerance, skin/hair changes  HEME: NEGATIVE for bleeding problems  PSYCHIATRIC: NEGATIVE for changes in mood or affect    EXAM:   /89 (BP Location: Left arm, Patient Position: Chair, Cuff Size: Adult Regular)   Pulse 72   Resp 16   Ht 5' 11\" (1.803 m)   Wt 207 lb 9.6 oz (94.2 kg)   SpO2 96%   BMI 28.95 kg/m      GENERAL APPEARANCE: healthy, alert and no distress     EYES: EOMI,  PERRL     HENT: ear canals and TM's normal and nose and mouth without ulcers or lesions     NECK: no adenopathy, no asymmetry, masses, or scars and thyroid normal to palpation     RESP: lungs clear to auscultation - no rales, rhonchi or wheezes     CV: regular rates and rhythm, normal S1 S2, no S3 or S4 and no murmur, click or rub     ABDOMEN:  soft, nontender, no HSM or masses and bowel sounds normal     MS: extremities normal- no gross deformities noted; well healed medial and lateral left ankle incisions     SKIN: no suspicious lesions or rashes     NEURO: Normal strength and tone, sensory exam grossly normal, mentation intact and speech normal     PSYCH: mentation appears normal. and affect normal/bright     LYMPHATICS: No cervical adenopathy    DIAGNOSTICS:   EKG: appears normal, NSR, normal axis, normal intervals, no acute ST/T changes c/w ischemia, no LVH by voltage criteria    Recent Labs   Lab Test 12/06/18  1530 12/04/18  0752 " 12/07/17  0755 02/06/17  1100   HGB  --  15.0 15.2 14.8   PLT  --  166 184  --    INR  --   --   --  1.00    133 139  --    POTASSIUM 4.5 4.5 4.5  --    CR 0.99 0.92 0.97  --    A1C 12.1* 11.9* 6.6*  --         IMPRESSION:   Reason for surgery/procedure: fractured screw, prior left ankle fusion  Diagnosis/reason for consult: diabetes    The proposed surgical procedure is considered LOW risk.    REVISED CARDIAC RISK INDEX  The patient has the following serious cardiovascular risks for perioperative complications such as (MI, PE, VFib and 3  AV Block):  No serious cardiac risks  INTERPRETATION: 0 risks: Class I (very low risk - 0.4% complication rate)    The patient has the following additional risks for perioperative complications:  No identified additional risks      ICD-10-CM    1. Preop general physical exam Z01.818        RECOMMENDATIONS:     --Consult hospital rounder / IM to assist post-op medical management    --Patient is to take all scheduled medications on the day of surgery EXCEPT for modifications listed below.    Diabetes Medication Use    -----Hold usual oral and non-insulin diabetic meds (e.g. Metformin, Actos, Glipizide) while NPO.       Anticoagulant or Antiplatelet Medication Use  ASPIRIN: Discontinue ASA 7-10 days prior to procedure to reduce bleeding risk.  It should be resumed post-operatively.        APPROVAL GIVEN to proceed with proposed procedure, provided current A1C has improved from above level.       Signed Electronically by: Abel Fernandez MD    Copy of this evaluation report is provided to requesting physician.    [facility] Preop Guidelines    Revised Cardiac Risk Index

## 2019-02-08 ENCOUNTER — TELEPHONE (OUTPATIENT)
Dept: OTHER | Facility: CLINIC | Age: 69
End: 2019-02-08

## 2019-02-08 NOTE — TELEPHONE ENCOUNTER
Faxed Pre-op, lab results, EKG February 8, 2019 to fax number [phone] [facility]     Right Fax confirmed at 147 PM    Nancy Villagran

## 2019-03-05 DIAGNOSIS — I10 ESSENTIAL HYPERTENSION WITH GOAL BLOOD PRESSURE LESS THAN 130/80: ICD-10-CM

## 2019-03-05 DIAGNOSIS — E78.5 HYPERLIPIDEMIA LDL GOAL <100: ICD-10-CM

## 2019-03-05 DIAGNOSIS — E11.00 TYPE 2 DIABETES MELLITUS WITH HYPEROSMOLARITY WITHOUT COMA, WITHOUT LONG-TERM CURRENT USE OF INSULIN (H): ICD-10-CM

## 2019-03-05 LAB
ALBUMIN UR-MCNC: NEGATIVE MG/DL
ANION GAP SERPL CALCULATED.3IONS-SCNC: 8 MMOL/L (ref 3–14)
APPEARANCE UR: CLEAR
BACTERIA #/AREA URNS HPF: ABNORMAL /HPF
BASOPHILS # BLD AUTO: 0 10E9/L (ref 0–0.2)
BASOPHILS NFR BLD AUTO: 0.5 %
BILIRUB UR QL STRIP: NEGATIVE
BUN SERPL-MCNC: 23 MG/DL (ref 7–30)
CALCIUM SERPL-MCNC: 9.6 MG/DL (ref 8.5–10.1)
CHLORIDE SERPL-SCNC: 107 MMOL/L (ref 94–109)
CHOLEST SERPL-MCNC: 134 MG/DL
CO2 SERPL-SCNC: 25 MMOL/L (ref 20–32)
COLOR UR AUTO: YELLOW
CREAT SERPL-MCNC: 0.86 MG/DL (ref 0.66–1.25)
CREAT UR-MCNC: 107 MG/DL
DIFFERENTIAL METHOD BLD: NORMAL
EOSINOPHIL # BLD AUTO: 0.1 10E9/L (ref 0–0.7)
EOSINOPHIL NFR BLD AUTO: 1.5 %
ERYTHROCYTE [DISTWIDTH] IN BLOOD BY AUTOMATED COUNT: 14 % (ref 10–15)
GFR SERPL CREATININE-BSD FRML MDRD: 89 ML/MIN/{1.73_M2}
GLUCOSE SERPL-MCNC: 138 MG/DL (ref 70–99)
GLUCOSE UR STRIP-MCNC: NEGATIVE MG/DL
HBA1C MFR BLD: 6.9 % (ref 0–5.6)
HCT VFR BLD AUTO: 40.6 % (ref 40–53)
HDLC SERPL-MCNC: 32 MG/DL
HGB BLD-MCNC: 13.5 G/DL (ref 13.3–17.7)
HGB UR QL STRIP: NEGATIVE
KETONES UR STRIP-MCNC: NEGATIVE MG/DL
LDLC SERPL CALC-MCNC: 72 MG/DL
LEUKOCYTE ESTERASE UR QL STRIP: NEGATIVE
LYMPHOCYTES # BLD AUTO: 3.2 10E9/L (ref 0.8–5.3)
LYMPHOCYTES NFR BLD AUTO: 41.4 %
MCH RBC QN AUTO: 30 PG (ref 26.5–33)
MCHC RBC AUTO-ENTMCNC: 33.3 G/DL (ref 31.5–36.5)
MCV RBC AUTO: 90 FL (ref 78–100)
MICROALBUMIN UR-MCNC: 6 MG/L
MICROALBUMIN/CREAT UR: 5.82 MG/G CR (ref 0–17)
MONOCYTES # BLD AUTO: 0.5 10E9/L (ref 0–1.3)
MONOCYTES NFR BLD AUTO: 6.3 %
MUCOUS THREADS #/AREA URNS LPF: PRESENT /LPF
NEUTROPHILS # BLD AUTO: 3.9 10E9/L (ref 1.6–8.3)
NEUTROPHILS NFR BLD AUTO: 50.3 %
NITRATE UR QL: NEGATIVE
NON-SQ EPI CELLS #/AREA URNS LPF: ABNORMAL /LPF
NONHDLC SERPL-MCNC: 102 MG/DL
PH UR STRIP: 5.5 PH (ref 5–7)
PLATELET # BLD AUTO: 341 10E9/L (ref 150–450)
POTASSIUM SERPL-SCNC: 4.5 MMOL/L (ref 3.4–5.3)
RBC # BLD AUTO: 4.5 10E12/L (ref 4.4–5.9)
RBC #/AREA URNS AUTO: ABNORMAL /HPF
SODIUM SERPL-SCNC: 140 MMOL/L (ref 133–144)
SOURCE: ABNORMAL
SP GR UR STRIP: 1.02 (ref 1–1.03)
T3FREE SERPL-MCNC: 2.3 PG/ML (ref 2.3–4.2)
T4 FREE SERPL-MCNC: 0.89 NG/DL (ref 0.76–1.46)
TRIGL SERPL-MCNC: 151 MG/DL
TSH SERPL DL<=0.005 MIU/L-ACNC: 1.29 MU/L (ref 0.4–4)
UROBILINOGEN UR STRIP-ACNC: 0.2 EU/DL (ref 0.2–1)
WBC # BLD AUTO: 7.8 10E9/L (ref 4–11)
WBC #/AREA URNS AUTO: ABNORMAL /HPF

## 2019-03-05 PROCEDURE — 84439 ASSAY OF FREE THYROXINE: CPT | Performed by: INTERNAL MEDICINE

## 2019-03-05 PROCEDURE — 80061 LIPID PANEL: CPT | Performed by: INTERNAL MEDICINE

## 2019-03-05 PROCEDURE — 84443 ASSAY THYROID STIM HORMONE: CPT | Performed by: INTERNAL MEDICINE

## 2019-03-05 PROCEDURE — 36415 COLL VENOUS BLD VENIPUNCTURE: CPT | Performed by: INTERNAL MEDICINE

## 2019-03-05 PROCEDURE — 83036 HEMOGLOBIN GLYCOSYLATED A1C: CPT | Performed by: INTERNAL MEDICINE

## 2019-03-05 PROCEDURE — 84481 FREE ASSAY (FT-3): CPT | Performed by: INTERNAL MEDICINE

## 2019-03-05 PROCEDURE — 82043 UR ALBUMIN QUANTITATIVE: CPT | Performed by: INTERNAL MEDICINE

## 2019-03-05 PROCEDURE — 81001 URINALYSIS AUTO W/SCOPE: CPT | Performed by: INTERNAL MEDICINE

## 2019-03-05 PROCEDURE — 80048 BASIC METABOLIC PNL TOTAL CA: CPT | Performed by: INTERNAL MEDICINE

## 2019-03-05 PROCEDURE — 85025 COMPLETE CBC W/AUTO DIFF WBC: CPT | Performed by: INTERNAL MEDICINE

## 2019-03-14 ENCOUNTER — OFFICE VISIT (OUTPATIENT)
Dept: OTHER | Facility: CLINIC | Age: 69
End: 2019-03-14
Attending: INTERNAL MEDICINE
Payer: COMMERCIAL

## 2019-03-14 VITALS
HEIGHT: 71 IN | BODY MASS INDEX: 28.98 KG/M2 | RESPIRATION RATE: 16 BRPM | SYSTOLIC BLOOD PRESSURE: 118 MMHG | HEART RATE: 71 BPM | OXYGEN SATURATION: 95 % | WEIGHT: 207 LBS | DIASTOLIC BLOOD PRESSURE: 76 MMHG

## 2019-03-14 DIAGNOSIS — I10 ESSENTIAL HYPERTENSION WITH GOAL BLOOD PRESSURE LESS THAN 130/80: ICD-10-CM

## 2019-03-14 DIAGNOSIS — E11.00 TYPE 2 DIABETES MELLITUS WITH HYPEROSMOLARITY WITHOUT COMA, WITHOUT LONG-TERM CURRENT USE OF INSULIN (H): Primary | ICD-10-CM

## 2019-03-14 DIAGNOSIS — E78.5 HYPERLIPIDEMIA LDL GOAL <100: ICD-10-CM

## 2019-03-14 PROCEDURE — 99214 OFFICE O/P EST MOD 30 MIN: CPT | Mod: ZP | Performed by: INTERNAL MEDICINE

## 2019-03-14 PROCEDURE — G0463 HOSPITAL OUTPT CLINIC VISIT: HCPCS

## 2019-03-14 ASSESSMENT — MIFFLIN-ST. JEOR: SCORE: 1731.08

## 2019-03-14 NOTE — PROGRESS NOTES
Dino Daniels is a 68 year old male who is presenting at the current time to discuss his diagnosi(es) of        Type 2 diabetes mellitus with hyperosmolarity without coma, without long-term current use of insulin (H)  Essential hypertension with goal blood pressure less than 130/80  Hyperlipidemia LDL goal <100 .          Review Of Systems  Skin: negative  Eyes: negative  Ears/Nose/Throat: negative  Respiratory: No shortness of breath, dyspnea on exertion, cough, or hemoptysis  Cardiovascular: negative  Gastrointestinal: negative  Genitourinary: negative  Musculoskeletal: negative  Neurologic: negative  Psychiatric: negative  Hematologic/Lymphatic/Immunologic: negative  Endocrine: negative     PAST MEDICAL HISTORY:                  Past Medical History:   Diagnosis Date     Arthritis     knees, left ankle and right thumb     Bladder stone      DEPRESSIVE DISORDER NEC 10/9/2006    resolved in 2009     Hypertension      Mumps      Other and unspecified hyperlipidemia     abstracted 7/17/02.     Type II or unspecified type diabetes mellitus without mention of complication, not stated as uncontrolled        PAST SURGICAL HISTORY:                  Past Surgical History:   Procedure Laterality Date     LASER HOLMIUM LITHOTRIPSY BLADDER  6/22/2012    Procedure: LASER HOLMIUM LITHOTRIPSY BLADDER;  Cystoscopy with Removal of Bladder Stone with Holmium Laser;  Surgeon: Chaz Cobb MD;  Location: [facility] OR     TESTICLE SURGERY       VASECTOMY       VASECTOMY       wisdom teeth[         CURRENT MEDICATIONS:                  Current Outpatient Medications   Medication Sig Dispense Refill     ASPIRIN PO Take 325 mg by mouth daily       atorvastatin (LIPITOR) 40 MG tablet Take 1 tablet (40 mg) by mouth daily 90 tablet 3     blood glucose monitoring (NO BRAND SPECIFIED) test strip Use to test blood sugars 4 times daily or as directed 400 strip 3     blood glucose monitoring (NO BRAND SPECIFIED) test strip Pt. Is testing  3 times daily ### DO NOT FILL NOW.  Please update patient's profile to reflect additional refills.  #### 300 each 3     blood glucose monitoring (ONETOUCH VERIO) meter device kit Use to test blood sugar 4 times daily or as directed 1 kit 0     Blood Glucose Monitoring Suppl (ASCENSIA BREEZE MONITOR) KIT 1 each daily. 1 kit 0     citalopram (CELEXA) 20 MG tablet Take 1 tablet (20 mg) by mouth daily 90 tablet 3     finasteride (PROSCAR) 5 MG tablet Take 1 tablet (5 mg) by mouth daily 90 tablet 3     KRILL OIL OR None Entered       lisinopril (PRINIVIL/ZESTRIL) 20 MG tablet Take 1 tablet (20 mg) by mouth daily 90 tablet 3     metFORMIN (GLUCOPHAGE) 1000 MG tablet TAKE ONE TABLET BY MOUTH TWICE DAILY 180 tablet 3     MULTIPLE VITAMINS CAPS   OR 1 capsule qd        tamsulosin (FLOMAX) 0.4 MG capsule Take 1 capsule (0.4 mg) by mouth daily 90 capsule 3     thin (NO BRAND SPECIFIED) lancets 1 each 3 times daily 3 Box 3     zolpidem (AMBIEN) 10 MG tablet TAKE ONE TABLET BY MOUTH AT BEDTIME AS NEEDED FOR SLEEP  10 tablet 4       ALLERGIES:                  Allergies   Allergen Reactions     No Known Drug Allergies        SOCIAL HISTORY:                  Social History     Socioeconomic History     Marital status:      Spouse name: Not on file     Number of children: Not on file     Years of education: Not on file     Highest education level: Not on file   Occupational History     Not on file   Social Needs     Financial resource strain: Not on file     Food insecurity:     Worry: Not on file     Inability: Not on file     Transportation needs:     Medical: Not on file     Non-medical: Not on file   Tobacco Use     Smoking status: Never Smoker     Smokeless tobacco: Never Used   Substance and Sexual Activity     Alcohol use: Yes     Alcohol/week: 0.0 oz     Comment: rarely     Drug use: No     Sexual activity: Not on file   Lifestyle     Physical activity:     Days per week: Not on file     Minutes per session: Not on  file     Stress: Not on file   Relationships     Social connections:     Talks on phone: Not on file     Gets together: Not on file     Attends Caodaism service: Not on file     Active member of club or organization: Not on file     Attends meetings of clubs or organizations: Not on file     Relationship status: Not on file     Intimate partner violence:     Fear of current or ex partner: Not on file     Emotionally abused: Not on file     Physically abused: Not on file     Forced sexual activity: Not on file   Other Topics Concern     Parent/sibling w/ CABG, MI or angioplasty before 65F 55M? Not Asked   Social History Narrative     Not on file       FAMILY HISTORY:                   Family History   Problem Relation Age of Onset      () Father          age 56 leukemia     Cerebrovascular Disease Mother       () Brother         b,      () Sister         b, back problems      () Brother         b, back problems       Physical exam Reveals:    O/P: WNL  HEENT: WNL  NECK: No JVD, thyromegaly, or lymphadenopathy  HEART: RRR, no murmurs, gallops, or rubs  LUNGS: CTA bilaterally without rales, wheezes, or rhonchi  GI: NABS, nondistended, nontender, soft  EXT:without cyanosis, clubbing, or edema  NEURO: nonfocal  : no flank tenderness            Component      Latest Ref Rng & Units 2018   WBC      4.0 - 11.0 10e9/L 6.3     RBC Count      4.4 - 5.9 10e12/L 5.05     Hemoglobin      13.3 - 17.7 g/dL 15.0     Hematocrit      40.0 - 53.0 % 43.8     MCV      78 - 100 fl 87     MCH      26.5 - 33.0 pg 29.7     MCHC      31.5 - 36.5 g/dL 34.2     RDW      10.0 - 15.0 % 13.7     Platelet Count      150 - 450 10e9/L 166     % Neutrophils      % 46.9     % Lymphocytes      % 43.8     % Monocytes      % 7.1     % Eosinophils      % 1.9     % Basophils      % 0.3     Absolute Neutrophil      1.6 - 8.3 10e9/L 3.0     Absolute Lymphocytes      0.8 - 5.3 10e9/L 2.8     Absolute Monocytes       0.0 - 1.3 10e9/L 0.5     Absolute Eosinophils      0.0 - 0.7 10e9/L 0.1     Absolute Basophils      0.0 - 0.2 10e9/L 0.0     Diff Method       Automated Method     Color Urine            Appearance Urine            Glucose Urine      NEG:Negative mg/dL      Bilirubin Urine      NEG:Negative      Ketones Urine      NEG:Negative mg/dL      Specific Gravity Urine      1.003 - 1.035      pH Urine      5.0 - 7.0 pH      Protein Albumin Urine      NEG:Negative mg/dL      Urobilinogen Urine      0.2 - 1.0 EU/dL      Nitrite Urine      NEG:Negative      Blood Urine      NEG:Negative      Leukocyte Esterase Urine      NEG:Negative      Source            WBC Urine      OTO5:0 - 5 /HPF      RBC Urine      OTO2:O - 2 /HPF      Squamous Epithelial /LPF Urine      FEW:Few /LPF      Bacteria Urine      NEG:Negative /HPF      Mucous Urine      NEG:Negative /LPF      Sodium      133 - 144 mmol/L 133 135    Potassium      3.4 - 5.3 mmol/L 4.5 4.5    Chloride      94 - 109 mmol/L 100 101    Carbon Dioxide      20 - 32 mmol/L 23 25    Anion Gap      3 - 14 mmol/L 10 9    Glucose      70 - 99 mg/dL 374 (H) 291 (H)    Urea Nitrogen      7 - 30 mg/dL 19 23    Creatinine      0.66 - 1.25 mg/dL 0.92 0.99    GFR Estimate      >60 mL/min/1.73:m2 82 75    GFR Estimate If Black      >60 mL/min/1.73:m2 >90 >90    Calcium      8.5 - 10.1 mg/dL 9.3 9.7    Bilirubin Direct      0.0 - 0.2 mg/dL 0.2     Bilirubin Total      0.2 - 1.3 mg/dL 0.7     Albumin      3.4 - 5.0 g/dL 4.2     Protein Total      6.8 - 8.8 g/dL 7.8     Alkaline Phosphatase      40 - 150 U/L 47     ALT      0 - 70 U/L 37     AST      0 - 45 U/L 22     Cholesterol      <200 mg/dL 133     Triglycerides      <150 mg/dL 207 (H)     HDL Cholesterol      >39 mg/dL 32 (L)     LDL Cholesterol Calculated      <100 mg/dL 60     Non HDL Cholesterol      <130 mg/dL 101     Creatinine Urine      mg/dL 92     Albumin Urine mg/L      mg/L 6     Albumin Urine mg/g Cr      0 - 17 mg/g Cr 6.64      Free T3      2.3 - 4.2 pg/mL 2.5     T4 Free      0.76 - 1.46 ng/dL 0.97     TSH      0.40 - 4.00 mU/L 2.26     Hemoglobin A1C      0 - 5.6 % 11.9 (H) 12.1 (H) 8.2 (H)   PSA      0 - 4 ug/L 1.55       Component      Latest Ref Rng & Units 3/5/2019   WBC      4.0 - 11.0 10e9/L 7.8   RBC Count      4.4 - 5.9 10e12/L 4.50   Hemoglobin      13.3 - 17.7 g/dL 13.5   Hematocrit      40.0 - 53.0 % 40.6   MCV      78 - 100 fl 90   MCH      26.5 - 33.0 pg 30.0   MCHC      31.5 - 36.5 g/dL 33.3   RDW      10.0 - 15.0 % 14.0   Platelet Count      150 - 450 10e9/L 341   % Neutrophils      % 50.3   % Lymphocytes      % 41.4   % Monocytes      % 6.3   % Eosinophils      % 1.5   % Basophils      % 0.5   Absolute Neutrophil      1.6 - 8.3 10e9/L 3.9   Absolute Lymphocytes      0.8 - 5.3 10e9/L 3.2   Absolute Monocytes      0.0 - 1.3 10e9/L 0.5   Absolute Eosinophils      0.0 - 0.7 10e9/L 0.1   Absolute Basophils      0.0 - 0.2 10e9/L 0.0   Diff Method       Automated Method   Color Urine       Yellow   Appearance Urine       Clear   Glucose Urine      NEG:Negative mg/dL Negative   Bilirubin Urine      NEG:Negative Negative   Ketones Urine      NEG:Negative mg/dL Negative   Specific Gravity Urine      1.003 - 1.035 1.020   pH Urine      5.0 - 7.0 pH 5.5   Protein Albumin Urine      NEG:Negative mg/dL Negative   Urobilinogen Urine      0.2 - 1.0 EU/dL 0.2   Nitrite Urine      NEG:Negative Negative   Blood Urine      NEG:Negative Negative   Leukocyte Esterase Urine      NEG:Negative Negative   Source       Midstream Urine   WBC Urine      OTO5:0 - 5 /HPF 0 - 5   RBC Urine      OTO2:O - 2 /HPF O - 2   Squamous Epithelial /LPF Urine      FEW:Few /LPF Few   Bacteria Urine      NEG:Negative /HPF Few (A)   Mucous Urine      NEG:Negative /LPF Present (A)   Sodium      133 - 144 mmol/L 140   Potassium      3.4 - 5.3 mmol/L 4.5   Chloride      94 - 109 mmol/L 107   Carbon Dioxide      20 - 32 mmol/L 25   Anion Gap      3 - 14 mmol/L 8    Glucose      70 - 99 mg/dL 138 (H)   Urea Nitrogen      7 - 30 mg/dL 23   Creatinine      0.66 - 1.25 mg/dL 0.86   GFR Estimate      >60 mL/min/1.73:m2 89   GFR Estimate If Black      >60 mL/min/1.73:m2 >90   Calcium      8.5 - 10.1 mg/dL 9.6   Bilirubin Direct      0.0 - 0.2 mg/dL    Bilirubin Total      0.2 - 1.3 mg/dL    Albumin      3.4 - 5.0 g/dL    Protein Total      6.8 - 8.8 g/dL    Alkaline Phosphatase      40 - 150 U/L    ALT      0 - 70 U/L    AST      0 - 45 U/L    Cholesterol      <200 mg/dL 134   Triglycerides      <150 mg/dL 151 (H)   HDL Cholesterol      >39 mg/dL 32 (L)   LDL Cholesterol Calculated      <100 mg/dL 72   Non HDL Cholesterol      <130 mg/dL 102   Creatinine Urine      mg/dL 107   Albumin Urine mg/L      mg/L 6   Albumin Urine mg/g Cr      0 - 17 mg/g Cr 5.82   Free T3      2.3 - 4.2 pg/mL 2.3   T4 Free      0.76 - 1.46 ng/dL 0.89   TSH      0.40 - 4.00 mU/L 1.29   Hemoglobin A1C      0 - 5.6 % 6.9 (H)   PSA      0 - 4 ug/L          A/P:    (E11.00) Type 2 diabetes mellitus with A1C goal < 7 %  (primary encounter diagnosis)  Comment: at goal off of liver supplement  Plan: CBC with platelets differential, Basic         metabolic panel, Hepatic panel, LipoFit by NMR,        Hemoglobin A1c, CRP cardiac risk, Lipoprotein A        RTC three months for labs    (I10) Essential hypertension with goal blood pressure less than 130/80  Comment: at goal  Plan: Basic metabolic panel            (E78.5) Hyperlipidemia LDL goal <100  Comment: at goal  Plan: T3 Free, T4 free, TSH, LipoFit by NMR,         Hemoglobin A1c, CRP cardiac risk, Lipoprotein A

## 2019-03-14 NOTE — PROGRESS NOTES
"Dino Daniels is a 68 year old male who presents for:  Chief Complaint   Patient presents with     RECHECK     follow up fasting labs        Vitals:    Vitals:    03/14/19 1038 03/14/19 1040   BP: (!) 137/91 135/87   BP Location: Right arm Left arm   Patient Position: Chair Chair   Cuff Size: Adult Regular Adult Regular   Pulse: 71    Resp: 16    SpO2: 95%    Weight: 207 lb (93.9 kg)    Height: 5' 11\" (1.803 m)        BMI:  Estimated body mass index is 28.87 kg/m  as calculated from the following:    Height as of this encounter: 5' 11\" (1.803 m).    Weight as of this encounter: 207 lb (93.9 kg).    Pain Score:  Data Unavailable        Hakan Diamond    "

## 2019-04-27 DIAGNOSIS — E11.00 TYPE 2 DIABETES MELLITUS WITH HYPEROSMOLARITY WITHOUT COMA, WITHOUT LONG-TERM CURRENT USE OF INSULIN (H): ICD-10-CM

## 2019-04-29 ENCOUNTER — MYC MEDICAL ADVICE (OUTPATIENT)
Dept: OTHER | Facility: CLINIC | Age: 69
End: 2019-04-29

## 2019-04-29 RX ORDER — BLOOD SUGAR DIAGNOSTIC
STRIP MISCELLANEOUS
Qty: 300 EACH | Refills: 1 | Status: SHIPPED | OUTPATIENT
Start: 2019-04-29 | End: 2019-05-01

## 2019-04-29 RX ORDER — LANCETS 33 GAUGE
EACH MISCELLANEOUS
Qty: 300 EACH | Refills: 1 | Status: SHIPPED | OUTPATIENT
Start: 2019-04-29

## 2019-04-29 NOTE — TELEPHONE ENCOUNTER
"Last Written Prescription Date:  12/6/18 (strips), 12/20/16 lancets  Last Fill Quantity: 400,300  # refills: 3   Last office visit: No previous visit found with prescribing provider:  3/14/19   Future Office Visit:    Requested Prescriptions   Pending Prescriptions Disp Refills     ONETOUCH VERIO IQ test strip [Pharmacy Med Name: OneTouch Verio In Vitro Strip] 300 each 0     Sig: TEST 4 TIMES DAILY       Diabetic Supplies Protocol Passed - 4/27/2019  9:40 AM        Passed - Medication is active on med list        Passed - Patient is 18 years of age or older        Passed - Recent (6 mo) or future (30 days) visit within the authorizing provider's specialty     Patient had office visit in the last 6 months or has a visit in the next 30 days with authorizing provider.  See \"Patient Info\" tab in inbasket, or \"Choose Columns\" in Meds & Orders section of the refill encounter.            ONETOUCH DELICA LANCETS 33G MISC [Pharmacy Med Name: OneTouch Delica Lancets 33G Miscellaneous] 300 each 0     Sig: TEST 4 TIMES DAILY       Diabetic Supplies Protocol Passed - 4/27/2019  9:40 AM        Passed - Medication is active on med list        Passed - Patient is 18 years of age or older        Passed - Recent (6 mo) or future (30 days) visit within the authorizing provider's specialty     Patient had office visit in the last 6 months or has a visit in the next 30 days with authorizing provider.  See \"Patient Info\" tab in inbasket, or \"Choose Columns\" in Meds & Orders section of the refill encounter.            Prescription approved per [facility] Refill Protocol.  Marissa Ellis, RN, BSN    "

## 2019-05-01 DIAGNOSIS — E11.9 TYPE 2 DIABETES MELLITUS WITH HEMOGLOBIN A1C GOAL OF 7.0%-8.0% (H): Primary | ICD-10-CM

## 2019-05-01 RX ORDER — BLOOD-GLUCOSE CONTROL, LOW
EACH MISCELLANEOUS
Qty: 1 BOTTLE | Refills: 1 | Status: SHIPPED | OUTPATIENT
Start: 2019-05-01

## 2019-05-01 RX ORDER — BLOOD-GLUCOSE CONTROL, NORMAL
EACH MISCELLANEOUS
Qty: 1 BOTTLE | Refills: 1 | Status: SHIPPED | OUTPATIENT
Start: 2019-05-01

## 2019-05-01 RX ORDER — BLOOD-GLUCOSE METER
EACH MISCELLANEOUS
Qty: 1 KIT | Refills: 1 | Status: SHIPPED | OUTPATIENT
Start: 2019-05-01

## 2019-05-01 NOTE — TELEPHONE ENCOUNTER
Insurance requires [employer],   new RX sent for requested brand, meter, test strips and lancets.  Done per scope  Marissa Ellis, RN, BSN

## 2019-05-02 ENCOUNTER — THERAPY VISIT (OUTPATIENT)
Dept: PHYSICAL THERAPY | Facility: CLINIC | Age: 69
End: 2019-05-02
Payer: COMMERCIAL

## 2019-05-02 DIAGNOSIS — M25.572 ACUTE LEFT ANKLE PAIN: ICD-10-CM

## 2019-05-02 DIAGNOSIS — Z98.890 STATUS POST SURGERY: ICD-10-CM

## 2019-05-02 PROCEDURE — 97161 PT EVAL LOW COMPLEX 20 MIN: CPT | Mod: GP | Performed by: PHYSICAL THERAPIST

## 2019-05-02 PROCEDURE — 97110 THERAPEUTIC EXERCISES: CPT | Mod: GP | Performed by: PHYSICAL THERAPIST

## 2019-05-02 NOTE — PROGRESS NOTES
[facility] Initial Evaluation  Subjective:  The history is provided by the patient. No  was used.   Dino Daniels is a 68 year old male with a left ankle condition.  Condition occurred with:  Insidious onset.  Condition occurred: for unknown reasons.  This is a new and chronic condition  Pt reports having left ankle fusion revision on 2/15/19.  Original fusion performed in 2017 failed.  .    Patient reports pain:  Anterior.  Radiates to:  Ankle.  Pain is described as aching and is intermittent and reported as 3/10.  Associated symptoms:  Loss of motion/stiffness and loss of strength. Pain is worse during the day.  Symptoms are exacerbated by walking and standing and relieved by rest.  Since onset symptoms are gradually improving.    Previous treatment includes physical therapy and surgery.  There was moderate improvement following previous treatment.  General health as reported by patient is good.  Pertinent medical history includes:  Depression, diabetes, high blood pressure and overweight.  Medical allergies: no.  Other surgeries include:  Orthopedic surgery (L ankle fusion 2017).  Current medications:  Anti-depressants and high blood pressure medication.  Current occupation is Psychologist  .  Patient is working in normal job without restrictions.  Primary job tasks include:  Prolonged standing and prolonged sitting.    Barriers include:  None as reported by patient.    Red flags:  None as reported by patient.                        Objective:    Gait:  L to out     Gait Type:  Antalgic   Assistive Devices:  None            Ankle/Foot Evaluation  ROM:      PROM:    Dorsiflexion:  Left:    -2     Right:   21   Plantarflexion:  Left:    28     Right:  61                    PALPATION: normal    EDEMA: normal                                                              General     ROS    Assessment/Plan:    Patient is a 68 year old male with left side ankle complaints.     Patient has the following significant findings with corresponding treatment plan.                Diagnosis 1:  S/p L ankle fusion  Pain -  hot/cold therapy, self management, education, directional preference exercise and home program  Decreased ROM/flexibility - manual therapy and therapeutic exercise  Decreased joint mobility - manual therapy and therapeutic exercise  Decreased strength - therapeutic exercise and therapeutic activities  Impaired balance - neuro re-education and therapeutic activities  Decreased proprioception - neuro re-education and therapeutic activities  Impaired gait - gait training  Impaired muscle performance - neuro re-education  Decreased function - therapeutic activities    Therapy Evaluation Codes:     Previous and current functional limitations:  (See Goal Flow Sheet for this information)    Short term and Long term goals: (See Goal Flow Sheet for this information)     Communication ability:  Patient appears to be able to clearly communicate and understand verbal and written communication and follow directions correctly.  Treatment Explanation - The following has been discussed with the patient:   RX ordered/plan of care  Anticipated outcomes  Possible risks and side effects  This patient would benefit from PT intervention to resume normal activities.   Rehab potential is good.    Frequency:  1 X week, once daily  Duration:  for 6 weeks  Discharge Plan:  Achieve all LTG.  Independent in home treatment program.  Reach maximal therapeutic benefit.    Please refer to the daily flowsheet for treatment today, total treatment time and time spent performing 1:1 timed codes.

## 2019-05-02 NOTE — LETTER
[facility] PHYSICAL THERAPY  [address]  [facility] [address]  [phone]    May 2, 2019    Re: Dino Daniels   :   1950  MRN:  [id number]   REFERRING PHYSICIAN:   Tyson Zimmerman    [facility] PHYSICAL [facility]  Date of Initial Evaluation:  19  Visits:  Rxs Used: 1  Reason for Referral: Acute left ankle pain; Status post surgery    EVALUATION SUMMARY    [facility] Initial Evaluation  Subjective:  The history is provided by the patient. No  was used.   Dino Daniels is a 68 year old male with a left ankle condition.  Condition occurred with:  Insidious onset.  Condition occurred: for unknown reasons.  This is a new and chronic condition  Pt reports having left ankle fusion revision on 2/15/19.  Original fusion performed in 2017 failed.      Patient reports pain:  Anterior.  Radiates to:  Ankle.  Pain is described as aching and is intermittent and reported as 3/10.  Associated symptoms:  Loss of motion/stiffness and loss of strength. Pain is worse during the day.  Symptoms are exacerbated by walking and standing and relieved by rest.  Since onset symptoms are gradually improving.    Previous treatment includes physical therapy and surgery.  There was moderate improvement following previous treatment.  General health as reported by patient is good.  Pertinent medical history includes:  Depression, diabetes, high blood pressure and overweight.  Medical allergies: no.  Other surgeries include:  Orthopedic surgery (L ankle fusion 2017).  Current medications:  Anti-depressants and high blood pressure medication.  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  Primary job tasks include:  Prolonged standing and prolonged sitting.  Barriers include:  None as reported by patient.  Red flags:  None as reported by patient.                 Objective:  Gait:  L to out   Gait Type:  Antalgic   Assistive Devices:  None    Ankle/Foot Evaluation  ROM:    PROM:    Dorsiflexion:  Left:    -2     Right:   21   Plantarflexion:  Left:    28     Right:  61  PALPATION: normal  EDEMA: normal    Assessment/Plan:    Patient is a 68 year old male with left side ankle complaints.    Patient has the following significant findings with corresponding treatment plan.                Diagnosis 1:  S/p L ankle fusion  Pain -  hot/cold therapy, self management, education, directional preference exercise and home program  Decreased ROM/flexibility - manual therapy and therapeutic exercise  Decreased joint mobility - manual therapy and therapeutic exercise  Re: Dino Daniels   :   1950          Decreased strength - therapeutic exercise and therapeutic activities  Impaired balance - neuro re-education and therapeutic activities  Decreased proprioception - neuro re-education and therapeutic activities  Impaired gait - gait training  Impaired muscle performance - neuro re-education  Decreased function - therapeutic activities    Previous and current functional limitations:  (See Goal Flow Sheet for this information)    Short term and Long term goals: (See Goal Flow Sheet for this information)     Communication ability:  Patient appears to be able to clearly communicate and understand verbal and written communication and follow directions correctly.  Treatment Explanation - The following has been discussed with the patient:   RX ordered/plan of care  Anticipated outcomes  Possible risks and side effects  This patient would benefit from PT intervention to resume normal activities.   Rehab potential is good.    Frequency:  1 X week, once daily  Duration:  for 6 weeks  Discharge Plan:  Achieve all LTG.  Independent in home treatment program.  Reach maximal therapeutic benefit.    Thank you for your referral.    INQUIRIES  Therapist: Thomas Arreola,    [facility]  [address] [facility] [address]  [facility] [address]  Phone: [phone]  Fax: [phone]

## 2019-05-23 ENCOUNTER — THERAPY VISIT (OUTPATIENT)
Dept: PHYSICAL THERAPY | Facility: CLINIC | Age: 69
End: 2019-05-23
Payer: COMMERCIAL

## 2019-05-23 DIAGNOSIS — M25.572 ACUTE LEFT ANKLE PAIN: ICD-10-CM

## 2019-05-23 DIAGNOSIS — Z98.890 STATUS POST SURGERY: ICD-10-CM

## 2019-05-23 PROCEDURE — 97110 THERAPEUTIC EXERCISES: CPT | Mod: GP | Performed by: PHYSICAL THERAPIST

## 2019-05-23 NOTE — PROGRESS NOTES
DISCHARGE REPORT    Progress reporting period is from eval to 5/23/19.       SUBJECTIVE  Subjective changes noted by patient:  .  Subjective: Better.  Not using boot anymore.  Tolerating 20+ minutes of walking.  He feels comfortable continueing independently with HEP at this time.    Current pain level is  Current Pain level: 2/10.     Previous pain level was   Initial Pain level: 3/10.   Changes in function:  Yes (See Goal flowsheet attached for changes in current functional level)  Adverse reaction to treatment or activity: None    OBJECTIVE  Changes noted in objective findings:  Yes,   Objective: Single leg stance on left for 30 seconds with UE support.     ASSESSMENT/PLAN  Updated problem list and treatment plan: Diagnosis 1:  S/p L ankle T/C and S/T fusion  Decreased strength - therapeutic exercise and therapeutic activities  Decreased proprioception - neuro re-education and therapeutic activities  Impaired muscle performance - neuro re-education  Decreased function - therapeutic activities  STG/LTGs have been met or progress has been made towards goals:  Yes (See Goal flow sheet completed today.)  Assessment of Progress: The patient's condition is improving.  The patient's condition has potential to improve.  Self Management Plans:  Patient has been instructed in a home treatment program.  Patient is independent in a home treatment program.  I have re-evaluated this patient and find that the nature, scope, duration and intensity of the therapy is appropriate for the medical condition of the patient.      Recommendations:  This patient is ready to be discharged from therapy and continue their home treatment program.    Please refer to the daily flowsheet for treatment today, total treatment time and time spent performing 1:1 timed codes.

## 2019-06-11 DIAGNOSIS — I10 ESSENTIAL HYPERTENSION WITH GOAL BLOOD PRESSURE LESS THAN 130/80: ICD-10-CM

## 2019-06-11 DIAGNOSIS — E11.00 TYPE 2 DIABETES MELLITUS WITH HYPEROSMOLARITY WITHOUT COMA, WITHOUT LONG-TERM CURRENT USE OF INSULIN (H): ICD-10-CM

## 2019-06-11 DIAGNOSIS — E78.5 HYPERLIPIDEMIA LDL GOAL <100: ICD-10-CM

## 2019-06-11 LAB
ALBUMIN SERPL-MCNC: 4.2 G/DL (ref 3.4–5)
ALP SERPL-CCNC: 39 U/L (ref 40–150)
ALT SERPL W P-5'-P-CCNC: 32 U/L (ref 0–70)
ANION GAP SERPL CALCULATED.3IONS-SCNC: 9 MMOL/L (ref 3–14)
AST SERPL W P-5'-P-CCNC: 17 U/L (ref 0–45)
BASOPHILS # BLD AUTO: 0 10E9/L (ref 0–0.2)
BASOPHILS NFR BLD AUTO: 0.6 %
BILIRUB DIRECT SERPL-MCNC: 0.2 MG/DL (ref 0–0.2)
BILIRUB SERPL-MCNC: 0.5 MG/DL (ref 0.2–1.3)
BUN SERPL-MCNC: 20 MG/DL (ref 7–30)
CALCIUM SERPL-MCNC: 9 MG/DL (ref 8.5–10.1)
CHLORIDE SERPL-SCNC: 107 MMOL/L (ref 94–109)
CO2 SERPL-SCNC: 22 MMOL/L (ref 20–32)
CREAT SERPL-MCNC: 0.87 MG/DL (ref 0.66–1.25)
DIFFERENTIAL METHOD BLD: NORMAL
EOSINOPHIL # BLD AUTO: 0.1 10E9/L (ref 0–0.7)
EOSINOPHIL NFR BLD AUTO: 1.7 %
ERYTHROCYTE [DISTWIDTH] IN BLOOD BY AUTOMATED COUNT: 14.3 % (ref 10–15)
GFR SERPL CREATININE-BSD FRML MDRD: 88 ML/MIN/{1.73_M2}
GLUCOSE SERPL-MCNC: 144 MG/DL (ref 70–99)
HBA1C MFR BLD: 6.7 % (ref 0–5.6)
HCT VFR BLD AUTO: 41.2 % (ref 40–53)
HGB BLD-MCNC: 14.3 G/DL (ref 13.3–17.7)
LYMPHOCYTES # BLD AUTO: 2.3 10E9/L (ref 0.8–5.3)
LYMPHOCYTES NFR BLD AUTO: 42.4 %
MCH RBC QN AUTO: 29.8 PG (ref 26.5–33)
MCHC RBC AUTO-ENTMCNC: 34.7 G/DL (ref 31.5–36.5)
MCV RBC AUTO: 86 FL (ref 78–100)
MONOCYTES # BLD AUTO: 0.4 10E9/L (ref 0–1.3)
MONOCYTES NFR BLD AUTO: 7.4 %
NEUTROPHILS # BLD AUTO: 2.6 10E9/L (ref 1.6–8.3)
NEUTROPHILS NFR BLD AUTO: 47.9 %
PLATELET # BLD AUTO: 160 10E9/L (ref 150–450)
POTASSIUM SERPL-SCNC: 4.6 MMOL/L (ref 3.4–5.3)
PROT SERPL-MCNC: 7.7 G/DL (ref 6.8–8.8)
RBC # BLD AUTO: 4.8 10E12/L (ref 4.4–5.9)
SODIUM SERPL-SCNC: 138 MMOL/L (ref 133–144)
T3FREE SERPL-MCNC: 2.3 PG/ML (ref 2.3–4.2)
T4 FREE SERPL-MCNC: 0.81 NG/DL (ref 0.76–1.46)
TSH SERPL DL<=0.005 MIU/L-ACNC: 1.11 MU/L (ref 0.4–4)
WBC # BLD AUTO: 5.4 10E9/L (ref 4–11)

## 2019-06-11 PROCEDURE — 83704 LIPOPROTEIN BLD QUAN PART: CPT | Mod: 90 | Performed by: INTERNAL MEDICINE

## 2019-06-11 PROCEDURE — 80076 HEPATIC FUNCTION PANEL: CPT | Performed by: INTERNAL MEDICINE

## 2019-06-11 PROCEDURE — 84481 FREE ASSAY (FT-3): CPT | Performed by: INTERNAL MEDICINE

## 2019-06-11 PROCEDURE — 83036 HEMOGLOBIN GLYCOSYLATED A1C: CPT | Performed by: INTERNAL MEDICINE

## 2019-06-11 PROCEDURE — 99000 SPECIMEN HANDLING OFFICE-LAB: CPT | Performed by: INTERNAL MEDICINE

## 2019-06-11 PROCEDURE — 83695 ASSAY OF LIPOPROTEIN(A): CPT | Performed by: INTERNAL MEDICINE

## 2019-06-11 PROCEDURE — 85025 COMPLETE CBC W/AUTO DIFF WBC: CPT | Performed by: INTERNAL MEDICINE

## 2019-06-11 PROCEDURE — 84443 ASSAY THYROID STIM HORMONE: CPT | Performed by: INTERNAL MEDICINE

## 2019-06-11 PROCEDURE — 86141 C-REACTIVE PROTEIN HS: CPT | Performed by: INTERNAL MEDICINE

## 2019-06-11 PROCEDURE — 84439 ASSAY OF FREE THYROXINE: CPT | Performed by: INTERNAL MEDICINE

## 2019-06-11 PROCEDURE — 80048 BASIC METABOLIC PNL TOTAL CA: CPT | Performed by: INTERNAL MEDICINE

## 2019-06-11 PROCEDURE — 36415 COLL VENOUS BLD VENIPUNCTURE: CPT | Performed by: INTERNAL MEDICINE

## 2019-06-12 LAB — CRP SERPL HS-MCNC: 0.3 MG/L

## 2019-06-13 LAB
CHOLEST SERPL-MCNC: 116 MG/DL
HDL SERPL QN: 8.2 NM
HDL SERPL-SCNC: 27.9 UMOL/L
HDLC SERPL-MCNC: 33 MG/DL (ref 40–59)
HLD.LARGE SERPL-SCNC: <2.8 UMOL/L
LDL SERPL QN: 20.5 NM
LDL SERPL-SCNC: 1061 NMOL/L
LDL SMALL SERPL-SCNC: 648 NMOL/L
LDLC SERPL CALC-MCNC: 60 MG/DL
LPA SERPL-MCNC: 15 MG/DL (ref 0–30)
PATHOLOGY STUDY: ABNORMAL
TRIGL SERPL-MCNC: 114 MG/DL (ref 30–149)
VLDL LARGE SERPL-SCNC: <1.5 NMOL/L
VLDL SERPL QN: 47.6 NM

## 2019-06-27 ENCOUNTER — OFFICE VISIT (OUTPATIENT)
Dept: OTHER | Facility: CLINIC | Age: 69
End: 2019-06-27
Attending: INTERNAL MEDICINE
Payer: COMMERCIAL

## 2019-06-27 VITALS
BODY MASS INDEX: 30.38 KG/M2 | HEIGHT: 71 IN | DIASTOLIC BLOOD PRESSURE: 68 MMHG | WEIGHT: 217 LBS | OXYGEN SATURATION: 96 % | SYSTOLIC BLOOD PRESSURE: 122 MMHG | HEART RATE: 81 BPM | RESPIRATION RATE: 16 BRPM

## 2019-06-27 DIAGNOSIS — E78.5 HYPERLIPIDEMIA LDL GOAL <100: ICD-10-CM

## 2019-06-27 DIAGNOSIS — Z12.5 SCREENING FOR PROSTATE CANCER: Primary | ICD-10-CM

## 2019-06-27 DIAGNOSIS — I10 ESSENTIAL HYPERTENSION WITH GOAL BLOOD PRESSURE LESS THAN 130/80: ICD-10-CM

## 2019-06-27 DIAGNOSIS — E11.00 TYPE 2 DIABETES MELLITUS WITH HYPEROSMOLARITY WITHOUT COMA, WITHOUT LONG-TERM CURRENT USE OF INSULIN (H): ICD-10-CM

## 2019-06-27 PROCEDURE — 99214 OFFICE O/P EST MOD 30 MIN: CPT | Mod: ZP | Performed by: INTERNAL MEDICINE

## 2019-06-27 PROCEDURE — G0463 HOSPITAL OUTPT CLINIC VISIT: HCPCS

## 2019-06-27 ASSESSMENT — MIFFLIN-ST. JEOR: SCORE: 1776.44

## 2019-06-27 NOTE — PROGRESS NOTES
iDno Daniels is a 68 year old male who is presenting at the current time to discuss his diagnosi(es) of        Type 2 diabetes mellitus with hyperosmolarity without coma, without long-term current use of insulin (H)  Essential hypertension with goal blood pressure less than 130/80  Hyperlipidemia LDL goal <100 .      PAST MEDICAL HISTORY:                  Past Medical History:   Diagnosis Date     Arthritis     knees, left ankle and right thumb     Bladder stone      DEPRESSIVE DISORDER NEC 10/9/2006    resolved in 2009     Hypertension      Mumps      Other and unspecified hyperlipidemia     abstracted 7/17/02.     Type II or unspecified type diabetes mellitus without mention of complication, not stated as uncontrolled        PAST SURGICAL HISTORY:                  Past Surgical History:   Procedure Laterality Date     LASER HOLMIUM LITHOTRIPSY BLADDER  6/22/2012    Procedure: LASER HOLMIUM LITHOTRIPSY BLADDER;  Cystoscopy with Removal of Bladder Stone with Holmium Laser;  Surgeon: Chaz Cobb MD;  Location: [facility] OR     TESTICLE SURGERY       VASECTOMY       VASECTOMY       wisdom teeth[         CURRENT MEDICATIONS:                  Current Outpatient Medications   Medication Sig Dispense Refill     ASPIRIN PO Take 325 mg by mouth daily       atorvastatin (LIPITOR) 40 MG tablet Take 1 tablet (40 mg) by mouth daily 90 tablet 3     blood glucose (CONTOUR NEXT TEST) test strip Use to test blood sugar 4 times daily. 300 strip 1     blood glucose calibration (CONTOUR NEXT CONTROL LOW VI SOLN) Low solution Use to calibrate blood glucose monitor as directed. 1 Bottle 1     blood glucose calibration (CONTOUR NEXT CONTROL NORMAL VI SOLN) Normal solution Use to calibrate blood glucose monitor as needed as directed. 1 Bottle 1     blood glucose monitoring (LYNDON MICROLET) lancets Use to test blood sugar 4 times daily. 300 each 1     blood glucose monitoring (CONTOUR NEXT MONITOR W/DEVICE KIT) meter device kit  Use to test blood sugar 4 times daily or as directed. 1 kit 1     citalopram (CELEXA) 20 MG tablet Take 1 tablet (20 mg) by mouth daily 90 tablet 3     finasteride (PROSCAR) 5 MG tablet Take 1 tablet (5 mg) by mouth daily 90 tablet 3     KRILL OIL OR None Entered       lisinopril (PRINIVIL/ZESTRIL) 20 MG tablet Take 1 tablet (20 mg) by mouth daily 90 tablet 3     metFORMIN (GLUCOPHAGE) 1000 MG tablet TAKE ONE TABLET BY MOUTH TWICE DAILY 180 tablet 3     MULTIPLE VITAMINS CAPS   OR 1 capsule qd        ONETOUCH DELICA LANCETS 33G MISC TEST 4 TIMES DAILY 300 each 1     tamsulosin (FLOMAX) 0.4 MG capsule Take 1 capsule (0.4 mg) by mouth daily 90 capsule 3     zolpidem (AMBIEN) 10 MG tablet TAKE ONE TABLET BY MOUTH AT BEDTIME AS NEEDED FOR SLEEP  10 tablet 4       ALLERGIES:                  Allergies   Allergen Reactions     No Known Drug Allergies        SOCIAL HISTORY:                  Social History     Socioeconomic History     Marital status:      Spouse name: Not on file     Number of children: Not on file     Years of education: Not on file     Highest education level: Not on file   Occupational History     Not on file   Social Needs     Financial resource strain: Not on file     Food insecurity:     Worry: Not on file     Inability: Not on file     Transportation needs:     Medical: Not on file     Non-medical: Not on file   Tobacco Use     Smoking status: Never Smoker     Smokeless tobacco: Never Used   Substance and Sexual Activity     Alcohol use: Yes     Alcohol/week: 0.0 oz     Comment: rarely     Drug use: No     Sexual activity: Not on file   Lifestyle     Physical activity:     Days per week: Not on file     Minutes per session: Not on file     Stress: Not on file   Relationships     Social connections:     Talks on phone: Not on file     Gets together: Not on file     Attends Scientologist service: Not on file     Active member of club or organization: Not on file     Attends meetings of clubs or  organizations: Not on file     Relationship status: Not on file     Intimate partner violence:     Fear of current or ex partner: Not on file     Emotionally abused: Not on file     Physically abused: Not on file     Forced sexual activity: Not on file   Other Topics Concern     Parent/sibling w/ CABG, MI or angioplasty before 65F 55M? Not Asked   Social History Narrative     Not on file       FAMILY HISTORY:                   Family History   Problem Relation Age of Onset      () Father          age 56 leukemia     Cerebrovascular Disease Mother       () Brother         b,      () Sister         b, back problems      () Brother         b, back problems           Physical exam Reveals:    O/P: WNL  HEENT: WNL  NECK: No JVD, thyromegaly, or lymphadenopathy  HEART: RRR, no murmurs, gallops, or rubs  LUNGS: CTA bilaterally without rales, wheezes, or rhonchi  GI: NABS, nondistended, nontender, soft  EXT:without cyanosis, clubbing, or edema  NEURO: nonfocal  : no flank tenderness      Component      Latest Ref Rng & Units 3/5/2019 2019   WBC      4.0 - 11.0 10e9/L 7.8 5.4   RBC Count      4.4 - 5.9 10e12/L 4.50 4.80   Hemoglobin      13.3 - 17.7 g/dL 13.5 14.3   Hematocrit      40.0 - 53.0 % 40.6 41.2   MCV      78 - 100 fl 90 86   MCH      26.5 - 33.0 pg 30.0 29.8   MCHC      31.5 - 36.5 g/dL 33.3 34.7   RDW      10.0 - 15.0 % 14.0 14.3   Platelet Count      150 - 450 10e9/L 341 160   % Neutrophils      % 50.3 47.9   % Lymphocytes      % 41.4 42.4   % Monocytes      % 6.3 7.4   % Eosinophils      % 1.5 1.7   % Basophils      % 0.5 0.6   Absolute Neutrophil      1.6 - 8.3 10e9/L 3.9 2.6   Absolute Lymphocytes      0.8 - 5.3 10e9/L 3.2 2.3   Absolute Monocytes      0.0 - 1.3 10e9/L 0.5 0.4   Absolute Eosinophils      0.0 - 0.7 10e9/L 0.1 0.1   Absolute Basophils      0.0 - 0.2 10e9/L 0.0 0.0   Diff Method       Automated Method Automated Method   Color Urine       Yellow    Appearance Urine        Clear    Glucose Urine      NEG:Negative mg/dL Negative    Bilirubin Urine      NEG:Negative Negative    Ketones Urine      NEG:Negative mg/dL Negative    Specific Gravity Urine      1.003 - 1.035 1.020    pH Urine      5.0 - 7.0 pH 5.5    Protein Albumin Urine      NEG:Negative mg/dL Negative    Urobilinogen Urine      0.2 - 1.0 EU/dL 0.2    Nitrite Urine      NEG:Negative Negative    Blood Urine      NEG:Negative Negative    Leukocyte Esterase Urine      NEG:Negative Negative    Source       Midstream Urine    WBC Urine      OTO5:0 - 5 /HPF 0 - 5    RBC Urine      OTO2:O - 2 /HPF O - 2    Squamous Epithelial /LPF Urine      FEW:Few /LPF Few    Bacteria Urine      NEG:Negative /HPF Few (A)    Mucous Urine      NEG:Negative /LPF Present (A)    Total Cholesterol      <=199 mg/dL  116   Triglycerides      30 - 149 mg/dL 151 (H) 114   HDL Cholesterol      40 - 59 mg/dL  33 (L)   LDL Cholesterol      <=129 mg/dL  60   HDL Size      >=8.9 nm  8.2 (L)   VLDL Size      <=46.7 nm  47.6 (H)   LDL Particle Size      >=20.7 nm  20.5 (L)   Lge HDL Particle number      >=4.2 umol/L  <2.8 (L)   HDL Particle Number NMR      >=33.0 umol/L  27.9 (L)   Lge VLDL Part number      <=2.7 nmol/L  <1.5   Small LDL Particle number      <=634 nmol/L  648 (H)   LDL Particle Number      <=1,135 nmol/L  1,061   EER LipoFit by NMR        SEE NOTE   Sodium      133 - 144 mmol/L 140 138   Potassium      3.4 - 5.3 mmol/L 4.5 4.6   Chloride      94 - 109 mmol/L 107 107   Carbon Dioxide      20 - 32 mmol/L 25 22   Anion Gap      3 - 14 mmol/L 8 9   Glucose      70 - 99 mg/dL 138 (H) 144 (H)   Urea Nitrogen      7 - 30 mg/dL 23 20   Creatinine      0.66 - 1.25 mg/dL 0.86 0.87   GFR Estimate      >60 mL/min/1.73:m2 89 88   GFR Estimate If Black      >60 mL/min/1.73:m2 >90 >90   Calcium      8.5 - 10.1 mg/dL 9.6 9.0   Bilirubin Direct      0.0 - 0.2 mg/dL  0.2   Bilirubin Total      0.2 - 1.3 mg/dL  0.5   Albumin      3.4 - 5.0 g/dL  4.2   Protein  Total      6.8 - 8.8 g/dL  7.7   Alkaline Phosphatase      40 - 150 U/L  39 (L)   ALT      0 - 70 U/L  32   AST      0 - 45 U/L  17   Cholesterol      <200 mg/dL 134    HDL Cholesterol      >39 mg/dL 32 (L)    LDL Cholesterol Calculated      <100 mg/dL 72    Non HDL Cholesterol      <130 mg/dL 102    Creatinine Urine      mg/dL 107    Albumin Urine mg/L      mg/L 6    Albumin Urine mg/g Cr      0 - 17 mg/g Cr 5.82    Hemoglobin A1C      0 - 5.6 % 6.9 (H) 6.7 (H)   TSH      0.40 - 4.00 mU/L 1.29 1.11   T4 Free      0.76 - 1.46 ng/dL 0.89 0.81   Free T3      2.3 - 4.2 pg/mL 2.3 2.3   Lipoprotein(a)      0 - 30 mg/dL  15   CRP Cardiac Risk      mg/L  0.3       A/P:

## 2019-06-27 NOTE — PROGRESS NOTES
"Dino Daniels is a 68 year old male who presents for:  Chief Complaint   Patient presents with     RECHECK     3 mo f/u to 3/14/19 Dr Fernandez visit , labs at  on 6/11/19 *        Vitals:    Vitals:    06/27/19 1135   BP: 135/84   BP Location: Right arm   Patient Position: Chair   Cuff Size: Adult Regular   Pulse: 81   Resp: 16   SpO2: 96%   Weight: 217 lb (98.4 kg)   Height: 5' 11\" (1.803 m)       BMI:  Estimated body mass index is 30.27 kg/m  as calculated from the following:    Height as of this encounter: 5' 11\" (1.803 m).    Weight as of this encounter: 217 lb (98.4 kg).    Pain Score:  Data Unavailable        Hakan Diamond    "

## 2019-07-17 ENCOUNTER — TRANSFERRED RECORDS (OUTPATIENT)
Dept: HEALTH INFORMATION MANAGEMENT | Facility: CLINIC | Age: 69
End: 2019-07-17

## 2019-09-16 DIAGNOSIS — E11.9 TYPE 2 DIABETES MELLITUS WITH HEMOGLOBIN A1C GOAL OF 7.0%-8.0% (H): ICD-10-CM

## 2019-09-16 RX ORDER — LANCETS
EACH MISCELLANEOUS
Qty: 300 EACH | Refills: 3 | Status: SHIPPED | OUTPATIENT
Start: 2019-09-16 | End: 2021-07-07

## 2019-09-16 NOTE — TELEPHONE ENCOUNTER
"Last Written Prescription Date:  5/1/19, 4/29/19  Last Fill Quantity: 300,  # refills: 1   Last office visit: No previous visit found with prescribing provider:  6/27/19   Future Office Visit:    Requested Prescriptions   Pending Prescriptions Disp Refills     CONTOUR NEXT TEST test strip [Pharmacy Med Name: Contour Next Test In Vitro Strip] 300 each 0     Sig: Use to test blood sugar 4 times daily.       Diabetic Supplies Protocol Passed - 9/16/2019 11:06 AM        Passed - Medication is active on med list        Passed - Patient is 18 years of age or older        Passed - Recent (6 mo) or future (30 days) visit within the authorizing provider's specialty     Patient had office visit in the last 6 months or has a visit in the next 30 days with authorizing provider.  See \"Patient Info\" tab in inbasket, or \"Choose Columns\" in Meds & Orders section of the refill encounter.            MICROLET LANCETS MISC [Pharmacy Med Name: Microlet Lancets Miscellaneous] 300 each 0     Sig: Use to test blood sugar 4 times daily.       Diabetic Supplies Protocol Passed - 9/16/2019 11:06 AM        Passed - Medication is active on med list        Passed - Patient is 18 years of age or older        Passed - Recent (6 mo) or future (30 days) visit within the authorizing provider's specialty     Patient had office visit in the last 6 months or has a visit in the next 30 days with authorizing provider.  See \"Patient Info\" tab in inbasket, or \"Choose Columns\" in Meds & Orders section of the refill encounter.            Prescription approved per [facility] Refill Protocol.    Nancy HEADLEYN, RN      "

## 2019-10-01 ENCOUNTER — HEALTH MAINTENANCE LETTER (OUTPATIENT)
Age: 69
End: 2019-10-01

## 2019-11-29 DIAGNOSIS — I10 ESSENTIAL HYPERTENSION WITH GOAL BLOOD PRESSURE LESS THAN 130/80: ICD-10-CM

## 2019-11-29 DIAGNOSIS — E78.5 HYPERLIPIDEMIA LDL GOAL <100: ICD-10-CM

## 2019-11-29 DIAGNOSIS — R33.9 URINARY RETENTION: ICD-10-CM

## 2019-11-29 RX ORDER — FINASTERIDE 5 MG/1
5 TABLET, FILM COATED ORAL DAILY
Qty: 90 TABLET | Refills: 0 | Status: SHIPPED | OUTPATIENT
Start: 2019-11-29 | End: 2019-12-18

## 2019-11-29 RX ORDER — ATORVASTATIN CALCIUM 40 MG/1
40 TABLET, FILM COATED ORAL DAILY
Qty: 90 TABLET | Refills: 0 | Status: SHIPPED | OUTPATIENT
Start: 2019-11-29 | End: 2019-12-18

## 2019-11-29 RX ORDER — LISINOPRIL 20 MG/1
20 TABLET ORAL DAILY
Qty: 90 TABLET | Refills: 0 | Status: SHIPPED | OUTPATIENT
Start: 2019-11-29 | End: 2019-12-18

## 2019-11-29 RX ORDER — TAMSULOSIN HYDROCHLORIDE 0.4 MG/1
0.4 CAPSULE ORAL DAILY
Qty: 90 CAPSULE | Refills: 3 | Status: SHIPPED | OUTPATIENT
Start: 2019-11-29 | End: 2019-12-18

## 2019-11-29 NOTE — TELEPHONE ENCOUNTER
"tamsulosin (FLOMAX) 0.4 MG capsule  Last Written Prescription Date:  12/6/18  Last Fill Quantity: 90,  # refills: 3   Last office visit: 6/27/19    Future Office Visit:   Next 5 appointments (look out 90 days)    Dec 18, 2019 10:40 AM CST  Return Visit with Abel Fernandez MD  [facility] ([facility] [address]  [place] [id number] [phone]         Requested Prescriptions   Pending Prescriptions Disp Refills     tamsulosin (FLOMAX) 0.4 MG capsule 90 capsule 3     Sig: Take 1 capsule (0.4 mg) by mouth daily       Alpha Blockers Passed - 11/29/2019  2:36 PM        Passed - Blood pressure under 140/90 in past 12 months     BP Readings from Last 3 Encounters:   06/27/19 122/68   03/14/19 118/76   02/07/19 137/89                 Passed - Recent (12 mo) or future (30 days) visit within the authorizing provider's specialty     Patient has had an office visit with the authorizing provider or a provider within the authorizing providers department within the previous 12 mos or has a future within next 30 days. See \"Patient Info\" tab in inbasket, or \"Choose Columns\" in Meds & Orders section of the refill encounter.              Passed - Patient does not have Tadalafil, Vardenafil, or Sildenafil on their medication list        Passed - Medication is active on med list        Passed - Patient is 18 years of age or older        Prescription approved per [facility] Refill Protocol.  Lorna Huffman RN BSN  [facility]      "

## 2019-11-29 NOTE — TELEPHONE ENCOUNTER
"atorvastatin (LIPITOR) 40 MG tablet  Last Written Prescription Date:  12/6/18  Last Fill Quantity: 90,  # refills: 3    finasteride (PROSCAR) 5 MG tablet  Last Written Prescription Date:  12/6/18  Last Fill Quantity: 90,  # refills: 3     lisinopril (PRINIVIL/ZESTRIL) 20 MG tablet  Last Written Prescription Date:  12/6/18  Last Fill Quantity: 90,  # refills: 3     Last office visit: 6/27/19    Future Office Visit:   Next 5 appointments (look out 90 days)    Dec 18, 2019 10:40 AM CST  Return Visit with Abel Fernandez MD  [facility] ([facility]) [address] . [address]  [facility] [address] [phone]         Requested Prescriptions   Pending Prescriptions Disp Refills     atorvastatin (LIPITOR) 40 MG tablet 90 tablet 0     Sig: Take 1 tablet (40 mg) by mouth daily       Statins Protocol Passed - 11/29/2019  2:40 PM        Passed - LDL on file in past 12 months     Recent Labs   Lab Test 03/05/19  0901   LDL 72             Passed - No abnormal creatine kinase in past 12 months     No lab results found.             Passed - Recent (12 mo) or future (30 days) visit within the authorizing provider's specialty     Patient has had an office visit with the authorizing provider or a provider within the authorizing providers department within the previous 12 mos or has a future within next 30 days. See \"Patient Info\" tab in inbasket, or \"Choose Columns\" in Meds & Orders section of the refill encounter.              Passed - Medication is active on med list        Passed - Patient is age 18 or older        finasteride (PROSCAR) 5 MG tablet 90 tablet 0     Sig: Take 1 tablet (5 mg) by mouth daily       Alpha Blockers Passed - 11/29/2019  2:40 PM        Passed - Blood pressure under 140/90 in past 12 months     BP Readings from Last 3 Encounters:   06/27/19 122/68   03/14/19 118/76   02/07/19 137/89                 Passed - Recent (12 mo) or " "future (30 days) visit within the authorizing provider's specialty     Patient has had an office visit with the authorizing provider or a provider within the authorizing providers department within the previous 12 mos or has a future within next 30 days. See \"Patient Info\" tab in inbasket, or \"Choose Columns\" in Meds & Orders section of the refill encounter.              Passed - Patient does not have Tadalafil, Vardenafil, or Sildenafil on their medication list        Passed - Medication is active on med list        Passed - Patient is 18 years of age or older        lisinopril (PRINIVIL/ZESTRIL) 20 MG tablet 90 tablet 0     Sig: Take 1 tablet (20 mg) by mouth daily       ACE Inhibitors (Including Combos) Protocol Passed - 11/29/2019  2:40 PM        Passed - Blood pressure under 140/90 in past 12 months     BP Readings from Last 3 Encounters:   06/27/19 122/68   03/14/19 118/76   02/07/19 137/89                 Passed - Recent (12 mo) or future (30 days) visit within the authorizing provider's specialty     Patient has had an office visit with the authorizing provider or a provider within the authorizing providers department within the previous 12 mos or has a future within next 30 days. See \"Patient Info\" tab in inbasket, or \"Choose Columns\" in Meds & Orders section of the refill encounter.              Passed - Medication is active on med list        Passed - Patient is age 18 or older        Passed - Normal serum creatinine on file in past 12 months     Recent Labs   Lab Test 06/11/19  0915   CR 0.87             Passed - Normal serum potassium on file in past 12 months     Recent Labs   Lab Test 06/11/19  0915   POTASSIUM 4.6             Prescription approved per [facility] Refill Protocol.      "

## 2019-12-12 DIAGNOSIS — E11.00 TYPE 2 DIABETES MELLITUS WITH HYPEROSMOLARITY WITHOUT COMA, WITHOUT LONG-TERM CURRENT USE OF INSULIN (H): ICD-10-CM

## 2019-12-12 DIAGNOSIS — I10 ESSENTIAL HYPERTENSION WITH GOAL BLOOD PRESSURE LESS THAN 130/80: ICD-10-CM

## 2019-12-12 DIAGNOSIS — E78.5 HYPERLIPIDEMIA LDL GOAL <100: ICD-10-CM

## 2019-12-12 DIAGNOSIS — Z12.5 SCREENING FOR PROSTATE CANCER: ICD-10-CM

## 2019-12-12 LAB
ALBUMIN SERPL-MCNC: 4.2 G/DL (ref 3.4–5)
ALP SERPL-CCNC: 41 U/L (ref 40–150)
ALT SERPL W P-5'-P-CCNC: 45 U/L (ref 0–70)
ANION GAP SERPL CALCULATED.3IONS-SCNC: 8 MMOL/L (ref 3–14)
AST SERPL W P-5'-P-CCNC: 22 U/L (ref 0–45)
BILIRUB DIRECT SERPL-MCNC: 0.1 MG/DL (ref 0–0.2)
BILIRUB SERPL-MCNC: 0.5 MG/DL (ref 0.2–1.3)
BUN SERPL-MCNC: 24 MG/DL (ref 7–30)
CALCIUM SERPL-MCNC: 9.3 MG/DL (ref 8.5–10.1)
CHLORIDE SERPL-SCNC: 109 MMOL/L (ref 94–109)
CHOLEST SERPL-MCNC: 123 MG/DL
CO2 SERPL-SCNC: 20 MMOL/L (ref 20–32)
CREAT SERPL-MCNC: 0.88 MG/DL (ref 0.66–1.25)
GFR SERPL CREATININE-BSD FRML MDRD: 87 ML/MIN/{1.73_M2}
GLUCOSE SERPL-MCNC: 219 MG/DL (ref 70–99)
HBA1C MFR BLD: 7.8 % (ref 0–5.6)
HDLC SERPL-MCNC: 31 MG/DL
LDLC SERPL CALC-MCNC: 66 MG/DL
NONHDLC SERPL-MCNC: 92 MG/DL
POTASSIUM SERPL-SCNC: 4.4 MMOL/L (ref 3.4–5.3)
PROT SERPL-MCNC: 7.8 G/DL (ref 6.8–8.8)
PSA SERPL-ACNC: 2.44 UG/L (ref 0–4)
SODIUM SERPL-SCNC: 137 MMOL/L (ref 133–144)
T3FREE SERPL-MCNC: 2.3 PG/ML (ref 2.3–4.2)
T4 FREE SERPL-MCNC: 0.83 NG/DL (ref 0.76–1.46)
TRIGL SERPL-MCNC: 129 MG/DL
TSH SERPL DL<=0.005 MIU/L-ACNC: 1.98 MU/L (ref 0.4–4)

## 2019-12-12 PROCEDURE — 80048 BASIC METABOLIC PNL TOTAL CA: CPT | Performed by: INTERNAL MEDICINE

## 2019-12-12 PROCEDURE — 84481 FREE ASSAY (FT-3): CPT | Performed by: INTERNAL MEDICINE

## 2019-12-12 PROCEDURE — 80076 HEPATIC FUNCTION PANEL: CPT | Performed by: INTERNAL MEDICINE

## 2019-12-12 PROCEDURE — G0103 PSA SCREENING: HCPCS | Performed by: INTERNAL MEDICINE

## 2019-12-12 PROCEDURE — 82043 UR ALBUMIN QUANTITATIVE: CPT | Performed by: INTERNAL MEDICINE

## 2019-12-12 PROCEDURE — 80061 LIPID PANEL: CPT | Performed by: INTERNAL MEDICINE

## 2019-12-12 PROCEDURE — 36415 COLL VENOUS BLD VENIPUNCTURE: CPT | Performed by: INTERNAL MEDICINE

## 2019-12-12 PROCEDURE — 84443 ASSAY THYROID STIM HORMONE: CPT | Performed by: INTERNAL MEDICINE

## 2019-12-12 PROCEDURE — 83036 HEMOGLOBIN GLYCOSYLATED A1C: CPT | Performed by: INTERNAL MEDICINE

## 2019-12-12 PROCEDURE — 84439 ASSAY OF FREE THYROXINE: CPT | Performed by: INTERNAL MEDICINE

## 2019-12-13 LAB
CREAT UR-MCNC: 151 MG/DL
MICROALBUMIN UR-MCNC: 8 MG/L
MICROALBUMIN/CREAT UR: 5.34 MG/G CR (ref 0–17)

## 2019-12-18 ENCOUNTER — OFFICE VISIT (OUTPATIENT)
Dept: OTHER | Facility: CLINIC | Age: 69
End: 2019-12-18
Attending: INTERNAL MEDICINE
Payer: COMMERCIAL

## 2019-12-18 VITALS
RESPIRATION RATE: 14 BRPM | DIASTOLIC BLOOD PRESSURE: 72 MMHG | BODY MASS INDEX: 30.94 KG/M2 | WEIGHT: 221 LBS | OXYGEN SATURATION: 96 % | HEIGHT: 71 IN | HEART RATE: 75 BPM | SYSTOLIC BLOOD PRESSURE: 112 MMHG

## 2019-12-18 DIAGNOSIS — E78.5 HYPERLIPIDEMIA LDL GOAL <100: ICD-10-CM

## 2019-12-18 DIAGNOSIS — I10 ESSENTIAL HYPERTENSION WITH GOAL BLOOD PRESSURE LESS THAN 130/80: ICD-10-CM

## 2019-12-18 DIAGNOSIS — R33.9 URINARY RETENTION: ICD-10-CM

## 2019-12-18 DIAGNOSIS — F32.5 MAJOR DEPRESSIVE DISORDER, SINGLE EPISODE IN FULL REMISSION (H): ICD-10-CM

## 2019-12-18 DIAGNOSIS — E11.00 TYPE 2 DIABETES MELLITUS WITH HYPEROSMOLARITY WITHOUT COMA, WITHOUT LONG-TERM CURRENT USE OF INSULIN (H): ICD-10-CM

## 2019-12-18 DIAGNOSIS — Z00.00 MEDICARE ANNUAL WELLNESS VISIT, SUBSEQUENT: Primary | ICD-10-CM

## 2019-12-18 DIAGNOSIS — F51.11 PRIMARY HYPERSOMNIA: ICD-10-CM

## 2019-12-18 PROCEDURE — G0463 HOSPITAL OUTPT CLINIC VISIT: HCPCS

## 2019-12-18 PROCEDURE — 99213 OFFICE O/P EST LOW 20 MIN: CPT | Mod: 25 | Performed by: INTERNAL MEDICINE

## 2019-12-18 PROCEDURE — G0439 PPPS, SUBSEQ VISIT: HCPCS | Mod: ZP | Performed by: INTERNAL MEDICINE

## 2019-12-18 RX ORDER — LISINOPRIL 20 MG/1
20 TABLET ORAL DAILY
Qty: 90 TABLET | Refills: 3 | Status: SHIPPED | OUTPATIENT
Start: 2019-12-18 | End: 2021-01-05

## 2019-12-18 RX ORDER — FINASTERIDE 5 MG/1
5 TABLET, FILM COATED ORAL DAILY
Qty: 90 TABLET | Refills: 3 | Status: SHIPPED | OUTPATIENT
Start: 2019-12-18 | End: 2021-01-05

## 2019-12-18 RX ORDER — GLIMEPIRIDE 2 MG/1
2 TABLET ORAL 2 TIMES DAILY
Qty: 180 TABLET | Refills: 3 | Status: SHIPPED | OUTPATIENT
Start: 2019-12-18 | End: 2020-12-15

## 2019-12-18 RX ORDER — ATORVASTATIN CALCIUM 40 MG/1
40 TABLET, FILM COATED ORAL DAILY
Qty: 90 TABLET | Refills: 3 | Status: SHIPPED | OUTPATIENT
Start: 2019-12-18 | End: 2021-01-05

## 2019-12-18 RX ORDER — GLIMEPIRIDE 2 MG/1
2 TABLET ORAL
Qty: 180 TABLET | Refills: 3 | Status: SHIPPED | OUTPATIENT
Start: 2019-12-18 | End: 2019-12-18

## 2019-12-18 RX ORDER — TAMSULOSIN HYDROCHLORIDE 0.4 MG/1
0.4 CAPSULE ORAL DAILY
Qty: 90 CAPSULE | Refills: 3 | Status: SHIPPED | OUTPATIENT
Start: 2019-12-18 | End: 2021-01-05

## 2019-12-18 RX ORDER — CITALOPRAM HYDROBROMIDE 20 MG/1
20 TABLET ORAL DAILY
Qty: 90 TABLET | Refills: 3 | Status: SHIPPED | OUTPATIENT
Start: 2019-12-18 | End: 2021-01-05

## 2019-12-18 RX ORDER — ZOLPIDEM TARTRATE 10 MG/1
TABLET ORAL
Qty: 10 TABLET | Refills: 4 | Status: SHIPPED | OUTPATIENT
Start: 2019-12-18 | End: 2020-11-30

## 2019-12-18 ASSESSMENT — MIFFLIN-ST. JEOR: SCORE: 1789.58

## 2019-12-18 NOTE — PROGRESS NOTES
Dino Daniels is a 69 year old male who presents for        Medicare annual wellness visit, subsequent  Type 2 diabetes mellitus with hyperosmolarity without coma, without long-term current use of insulin (H)  Essential hypertension with goal blood pressure less than 130/80  Hyperlipidemia LDL goal <100  Major depressive disorder, single episode in full remission (H)  Urinary retention  Primary hypersomnia     Review Of Systems  Skin: negative  Eyes: negative  Ears/Nose/Throat: negative  Respiratory: No shortness of breath, dyspnea on exertion, cough, or hemoptysis  Cardiovascular: negative  Gastrointestinal: negative  Genitourinary: negative  Musculoskeletal: negative  Neurologic: negative  Psychiatric: negative  Hematologic/Lymphatic/Immunologic: negative  Endocrine: negative      Current providers caring for this patient include:  Patient Care Team:  Abel Fernandez MD as PCP - General    Complete Medical and Social history reviewed with patient, outlined below.    Patient Active Problem List   Diagnosis     Pain in joint, ankle and foot     HYPERLIPIDEMIA LDL GOAL <100     Type 2 diabetes, HbA1c goal < 7% (H)     Major depressive disorder, single episode in full remission (H)       Past Medical History:   Diagnosis Date     Arthritis     knees, left ankle and right thumb     Bladder stone      DEPRESSIVE DISORDER NEC 10/9/2006    resolved in 2009     Hypertension      Mumps      Other and unspecified hyperlipidemia     abstracted 7/17/02.     Type II or unspecified type diabetes mellitus without mention of complication, not stated as uncontrolled        Past Surgical History:   Procedure Laterality Date     LASER HOLMIUM LITHOTRIPSY BLADDER  6/22/2012    Procedure: LASER HOLMIUM LITHOTRIPSY BLADDER;  Cystoscopy with Removal of Bladder Stone with Holmium Laser;  Surgeon: Chaz Cobb MD;  Location: [facility] OR     TESTICLE SURGERY       VASECTOMY       VASECTOMY       wisdom teeth[    "      Family History   Problem Relation Age of Onset      () Father          age 56 leukemia     Cerebrovascular Disease Mother       () Brother         b,195      () Sister         b,4 back problems      () Brother         b,5 back problems       Social History     Tobacco Use     Smoking status: Never Smoker     Smokeless tobacco: Never Used   Substance Use Topics     Alcohol use: Yes     Alcohol/week: 0.0 standard drinks     Comment: rarely       Diet: regular, low salt/low fat  Physical Activity: active without specific exercise program  Depression Screen:    Over the past 2 weeks, patient has felt down, depressed, or hopeless:  No    Over the past 2 weeks, patient has felt little interest or pleasure in doing things: No    Functional ability/Safety screen:  Up and go test (able to get up and walk longer than 30 seconds): Passed  Patient needs assistance with: nothing  Patient's home has the following possible safety concerns: none identified  Patient has concerns about his hearing:  No  Cognitive Screen  Patient repeats three objects (ball, flag, tree)      Clock drawing test:   NORMAL  Recalls three objects after 3 minutes (ball,flag,tree):                                                                                               recalls 3 objects (3 points)    Physical Exam:  /72 (BP Location: Right arm, Patient Position: Chair, Cuff Size: Adult Regular)   Pulse 75   Resp 14   Ht 5' 11\" (1.803 m)   Wt 221 lb (100.2 kg)   SpO2 96%   BMI 30.82 kg/m     Body mass index is 30.82 kg/m .         GENERAL APPEARANCE: healthy, alert and no distress  PSYCH: Affect normal/bright.  Mentation within normal limits.  EYES: conjunctiva clear  Small cataracts bilaterally  HENT: ear canals and TM's normal.  Nose and mouth without ulcers, erythema or lesions  NECK: supple, nontender, no lymphadenopathy  RESP: lungs clear to auscultation - no rales, rhonchi or wheezes  CV: regular rates and rhythm, " normal S1 S2, no murmur noted  ABDOMEN:  soft, nontender, no HSM or masses and bowel sounds normal  SKIN: no suspicious lesions or rashes  NEURO: Normal strength and tone,  normal speech and mentation  Extremities: no peripheral edema or tenderness, peripheral pulses normal  MS: extremities normal- no gross deformities noted, no erythema, FROM noted in all extremities  PSYCH: mentation appears normal  LYMPHATICS: no cervical adenopathy            Component      Latest Ref Rng & Units 3/5/2019 6/11/2019   WBC      4.0 - 11.0 10e9/L 7.8 5.4   RBC Count      4.4 - 5.9 10e12/L 4.50 4.80   Hemoglobin      13.3 - 17.7 g/dL 13.5 14.3   Hematocrit      40.0 - 53.0 % 40.6 41.2   MCV      78 - 100 fl 90 86   MCH      26.5 - 33.0 pg 30.0 29.8   MCHC      31.5 - 36.5 g/dL 33.3 34.7   RDW      10.0 - 15.0 % 14.0 14.3   Platelet Count      150 - 450 10e9/L 341 160   % Neutrophils      % 50.3 47.9   % Lymphocytes      % 41.4 42.4   % Monocytes      % 6.3 7.4   % Eosinophils      % 1.5 1.7   % Basophils      % 0.5 0.6   Absolute Neutrophil      1.6 - 8.3 10e9/L 3.9 2.6   Absolute Lymphocytes      0.8 - 5.3 10e9/L 3.2 2.3   Absolute Monocytes      0.0 - 1.3 10e9/L 0.5 0.4   Absolute Eosinophils      0.0 - 0.7 10e9/L 0.1 0.1   Absolute Basophils      0.0 - 0.2 10e9/L 0.0 0.0   Diff Method       Automated Method Automated Method   Color Urine       Yellow    Appearance Urine       Clear    Glucose Urine      NEG:Negative mg/dL Negative    Bilirubin Urine      NEG:Negative Negative    Ketones Urine      NEG:Negative mg/dL Negative    Specific Gravity Urine      1.003 - 1.035 1.020    pH Urine      5.0 - 7.0 pH 5.5    Protein Albumin Urine      NEG:Negative mg/dL Negative    Urobilinogen Urine      0.2 - 1.0 EU/dL 0.2    Nitrite Urine      NEG:Negative Negative    Blood Urine      NEG:Negative Negative    Leukocyte Esterase Urine      NEG:Negative Negative    Source       Midstream Urine    WBC Urine      OTO5:0 - 5 /HPF 0 - 5    RBC  Urine      OTO2:O - 2 /HPF O - 2    Squamous Epithelial /LPF Urine      FEW:Few /LPF Few    Bacteria Urine      NEG:Negative /HPF Few (A)    Mucous Urine      NEG:Negative /LPF Present (A)    Total Cholesterol      <=199 mg/dL  116   Triglycerides      <150 mg/dL 151 (H) 114   HDL Cholesterol      40 - 59 mg/dL  33 (L)   LDL Cholesterol      <=129 mg/dL  60   HDL Size      >=8.9 nm  8.2 (L)   VLDL Size      <=46.7 nm  47.6 (H)   LDL Particle Size      >=20.7 nm  20.5 (L)   Lge HDL Particle number      >=4.2 umol/L  <2.8 (L)   HDL Particle Number NMR      >=33.0 umol/L  27.9 (L)   Lge VLDL Part number      <=2.7 nmol/L  <1.5   Small LDL Particle number      <=634 nmol/L  648 (H)   LDL Particle Number      <=1,135 nmol/L  1,061   EER LipoFit by NMR        SEE NOTE   Sodium      133 - 144 mmol/L 140 138   Potassium      3.4 - 5.3 mmol/L 4.5 4.6   Chloride      94 - 109 mmol/L 107 107   Carbon Dioxide      20 - 32 mmol/L 25 22   Anion Gap      3 - 14 mmol/L 8 9   Glucose      70 - 99 mg/dL 138 (H) 144 (H)   Urea Nitrogen      7 - 30 mg/dL 23 20   Creatinine      0.66 - 1.25 mg/dL 0.86 0.87   GFR Estimate      >60 mL/min/1.73:m2 89 88   GFR Estimate If Black      >60 mL/min/1.73:m2 >90 >90   Calcium      8.5 - 10.1 mg/dL 9.6 9.0   Bilirubin Direct      0.0 - 0.2 mg/dL  0.2   Bilirubin Total      0.2 - 1.3 mg/dL  0.5   Albumin      3.4 - 5.0 g/dL  4.2   Protein Total      6.8 - 8.8 g/dL  7.7   Alkaline Phosphatase      40 - 150 U/L  39 (L)   ALT      0 - 70 U/L  32   AST      0 - 45 U/L  17   Cholesterol      <200 mg/dL 134    HDL Cholesterol      >39 mg/dL 32 (L)    LDL Cholesterol Calculated      <100 mg/dL 72    Non HDL Cholesterol      <130 mg/dL 102    Creatinine Urine      mg/dL 107    Albumin Urine mg/L      mg/L 6    Albumin Urine mg/g Cr      0 - 17 mg/g Cr 5.82    Hemoglobin A1C      0 - 5.6 % 6.9 (H) 6.7 (H)   TSH      0.40 - 4.00 mU/L 1.29 1.11   T4 Free      0.76 - 1.46 ng/dL 0.89 0.81   Free T3      2.3 -  4.2 pg/mL 2.3 2.3   Lipoprotein(a)      0 - 30 mg/dL  15   CRP Cardiac Risk      mg/L  0.3   PSA      0 - 4 ug/L       Component      Latest Ref Rng & Units 12/12/2019   WBC      4.0 - 11.0 10e9/L    RBC Count      4.4 - 5.9 10e12/L    Hemoglobin      13.3 - 17.7 g/dL    Hematocrit      40.0 - 53.0 %    MCV      78 - 100 fl    MCH      26.5 - 33.0 pg    MCHC      31.5 - 36.5 g/dL    RDW      10.0 - 15.0 %    Platelet Count      150 - 450 10e9/L    % Neutrophils      %    % Lymphocytes      %    % Monocytes      %    % Eosinophils      %    % Basophils      %    Absolute Neutrophil      1.6 - 8.3 10e9/L    Absolute Lymphocytes      0.8 - 5.3 10e9/L    Absolute Monocytes      0.0 - 1.3 10e9/L    Absolute Eosinophils      0.0 - 0.7 10e9/L    Absolute Basophils      0.0 - 0.2 10e9/L    Diff Method          Color Urine          Appearance Urine          Glucose Urine      NEG:Negative mg/dL    Bilirubin Urine      NEG:Negative    Ketones Urine      NEG:Negative mg/dL    Specific Gravity Urine      1.003 - 1.035    pH Urine      5.0 - 7.0 pH    Protein Albumin Urine      NEG:Negative mg/dL    Urobilinogen Urine      0.2 - 1.0 EU/dL    Nitrite Urine      NEG:Negative    Blood Urine      NEG:Negative    Leukocyte Esterase Urine      NEG:Negative    Source          WBC Urine      OTO5:0 - 5 /HPF    RBC Urine      OTO2:O - 2 /HPF    Squamous Epithelial /LPF Urine      FEW:Few /LPF    Bacteria Urine      NEG:Negative /HPF    Mucous Urine      NEG:Negative /LPF    Total Cholesterol      <=199 mg/dL    Triglycerides      <150 mg/dL 129   HDL Cholesterol      40 - 59 mg/dL    LDL Cholesterol      <=129 mg/dL    HDL Size      >=8.9 nm    VLDL Size      <=46.7 nm    LDL Particle Size      >=20.7 nm    Lge HDL Particle number      >=4.2 umol/L    HDL Particle Number NMR      >=33.0 umol/L    Lge VLDL Part number      <=2.7 nmol/L    Small LDL Particle number      <=634 nmol/L    LDL Particle Number      <=1,135 nmol/L    EER LipoFit  by NMR          Sodium      133 - 144 mmol/L 137   Potassium      3.4 - 5.3 mmol/L 4.4   Chloride      94 - 109 mmol/L 109   Carbon Dioxide      20 - 32 mmol/L 20   Anion Gap      3 - 14 mmol/L 8   Glucose      70 - 99 mg/dL 219 (H)   Urea Nitrogen      7 - 30 mg/dL 24   Creatinine      0.66 - 1.25 mg/dL 0.88   GFR Estimate      >60 mL/min/1.73:m2 87   GFR Estimate If Black      >60 mL/min/1.73:m2 >90   Calcium      8.5 - 10.1 mg/dL 9.3   Bilirubin Direct      0.0 - 0.2 mg/dL 0.1   Bilirubin Total      0.2 - 1.3 mg/dL 0.5   Albumin      3.4 - 5.0 g/dL 4.2   Protein Total      6.8 - 8.8 g/dL 7.8   Alkaline Phosphatase      40 - 150 U/L 41   ALT      0 - 70 U/L 45   AST      0 - 45 U/L 22   Cholesterol      <200 mg/dL 123   HDL Cholesterol      >39 mg/dL 31 (L)   LDL Cholesterol Calculated      <100 mg/dL 66   Non HDL Cholesterol      <130 mg/dL 92   Creatinine Urine      mg/dL 151   Albumin Urine mg/L      mg/L 8   Albumin Urine mg/g Cr      0 - 17 mg/g Cr 5.34   Hemoglobin A1C      0 - 5.6 % 7.8 (H)   TSH      0.40 - 4.00 mU/L 1.98   T4 Free      0.76 - 1.46 ng/dL 0.83   Free T3      2.3 - 4.2 pg/mL 2.3   Lipoprotein(a)      0 - 30 mg/dL    CRP Cardiac Risk      mg/L    PSA      0 - 4 ug/L 2.44         End of Life Planning:   Patient currently has an advanced directive: Yes.  Practitioner is supportive of decision.    Education/Counseling:   Based on review of the above information, the following items were addressed:      Obesity - appropriate counseling on diet, exercise, etc.      Elevated blood pressure - follow-up plans made      Diabetes -  access to diabetes self-management training, medical nutrition therapy, and treatment    Appropriate preventive services were discussed with this patient, including applicable screening as appropriate for cardiovascular disease, diabetes, osteopenia/osteoporosis, and glaucoma.  As appropriate for age/gender, discussed screening for colorectal cancer, prostate cancer, breast  cancer, and cervical cancer.   Checklist reviewing preventive services available has been given to the patient.      (Z00.00) Medicare annual wellness visit, subsequent  (primary encounter diagnosis)  Comment: doing well, RTC one year for annual exam, colonosocpy in 2022  Plan: Follow-Up with Vascular Medicine, CBC with         platelets differential, T4 free, T3 Free, TSH,         Basic metabolic panel, LipoFit by NMR,         Hemoglobin A1c, Albumin Random Urine         Quantitative with Creat Ratio, CRP cardiac         risk, Lipoprotein (a)            (E11.00) Type 2 diabetes mellitus with A1C goal < 7 %  Comment: not at goal, add Amaryl, repeat labs in six months, make diet and exercise improvements, consider cessation of Amaryl n one year if metabolic improvements made and able toremain compliant with lifestyle changes  Plan: OFFICE/OUTPT VISIT,EST,LEVL III, metFORMIN         (GLUCOPHAGE) 1000 MG tablet, glimepiride         (AMARYL) 2 MG tablet, Follow-Up with Vascular         Medicine, Hemoglobin A1c, Albumin Random Urine         Quantitative with Creat Ratio, CRP cardiac         risk, Lipoprotein (a)            (I10) Essential hypertension with goal blood pressure less than 130/80  Comment: at goal  Plan: OFFICE/OUTPT VISIT,EST,LEVL III, lisinopril         (PRINIVIL/ZESTRIL) 20 MG tablet, Follow-Up with        Vascular Medicine, CBC with platelets         differential, Basic metabolic panel            (E78.5) Hyperlipidemia LDL goal <100  Comment: at goal  Plan: OFFICE/OUTPT VISIT,EST,LEVL III, atorvastatin         (LIPITOR) 40 MG tablet, Follow-Up with Vascular        Medicine, T4 free, T3 Free, TSH, LipoFit by         NMR, CRP cardiac risk, Lipoprotein (a)            (F32.5) Major depressive disorder, single episode in full remission (H)  Comment: stable  Plan: citalopram (CELEXA) 20 MG tablet, Follow-Up         with Vascular Medicine            (R33.9) Urinary retention  Comment: doing well  Plan:  finasteride (PROSCAR) 5 MG tablet, tamsulosin         (FLOMAX) 0.4 MG capsule, Follow-Up with         Vascular Medicine            (F51.11) Primary hypersomnia  Comment: needs the below  Plan: zolpidem (AMBIEN) 10 MG tablet, Follow-Up with         Vascular Medicine            Greater than one half the 15 minutes not spent on preventive care during this visit was spent providing aducation and counselling regarding item(s) # 2 onward as delineated above.

## 2019-12-18 NOTE — PROGRESS NOTES
"Dino Daniels is a 69 year old male who presents for:  Chief Complaint   Patient presents with     Nurse Visit     follow up labs done 12/18/19 *elana        Vitals:    Vitals:    12/18/19 1037   BP: 133/82   BP Location: Right arm   Patient Position: Chair   Cuff Size: Adult Regular   Pulse: 75   Resp: 14   SpO2: 96%   Weight: 221 lb (100.2 kg)   Height: 5' 11\" (1.803 m)       BMI:  Estimated body mass index is 30.82 kg/m  as calculated from the following:    Height as of this encounter: 5' 11\" (1.803 m).    Weight as of this encounter: 221 lb (100.2 kg).    Pain Score:  Data Unavailable        Hakan Diamond CMA    "

## 2020-05-05 ENCOUNTER — TRANSFERRED RECORDS (OUTPATIENT)
Dept: HEALTH INFORMATION MANAGEMENT | Facility: CLINIC | Age: 70
End: 2020-05-05

## 2020-07-29 DIAGNOSIS — F32.5 MAJOR DEPRESSIVE DISORDER, SINGLE EPISODE IN FULL REMISSION (H): ICD-10-CM

## 2020-07-29 DIAGNOSIS — E11.00 TYPE 2 DIABETES MELLITUS WITH HYPEROSMOLARITY WITHOUT COMA, WITHOUT LONG-TERM CURRENT USE OF INSULIN (H): ICD-10-CM

## 2020-07-29 DIAGNOSIS — E78.5 HYPERLIPIDEMIA LDL GOAL <100: ICD-10-CM

## 2020-07-29 DIAGNOSIS — I10 ESSENTIAL HYPERTENSION WITH GOAL BLOOD PRESSURE LESS THAN 130/80: ICD-10-CM

## 2020-07-29 DIAGNOSIS — Z00.00 MEDICARE ANNUAL WELLNESS VISIT, SUBSEQUENT: ICD-10-CM

## 2020-07-29 DIAGNOSIS — R33.9 URINARY RETENTION: ICD-10-CM

## 2020-07-29 DIAGNOSIS — F51.11 PRIMARY HYPERSOMNIA: ICD-10-CM

## 2020-07-29 LAB
ANION GAP SERPL CALCULATED.3IONS-SCNC: 6 MMOL/L (ref 3–14)
BASOPHILS # BLD AUTO: 0.1 10E9/L (ref 0–0.2)
BASOPHILS NFR BLD AUTO: 0.7 %
BUN SERPL-MCNC: 21 MG/DL (ref 7–30)
CALCIUM SERPL-MCNC: 9.2 MG/DL (ref 8.5–10.1)
CHLORIDE SERPL-SCNC: 106 MMOL/L (ref 94–109)
CO2 SERPL-SCNC: 23 MMOL/L (ref 20–32)
CREAT SERPL-MCNC: 0.89 MG/DL (ref 0.66–1.25)
CREAT UR-MCNC: 174 MG/DL
CRP SERPL HS-MCNC: 0.6 MG/L
DIFFERENTIAL METHOD BLD: NORMAL
EOSINOPHIL # BLD AUTO: 0.1 10E9/L (ref 0–0.7)
EOSINOPHIL NFR BLD AUTO: 1.9 %
ERYTHROCYTE [DISTWIDTH] IN BLOOD BY AUTOMATED COUNT: 13.6 % (ref 10–15)
GFR SERPL CREATININE-BSD FRML MDRD: 87 ML/MIN/{1.73_M2}
GLUCOSE SERPL-MCNC: 217 MG/DL (ref 70–99)
HBA1C MFR BLD: 8.2 % (ref 0–5.6)
HCT VFR BLD AUTO: 43.4 % (ref 40–53)
HGB BLD-MCNC: 14.6 G/DL (ref 13.3–17.7)
LYMPHOCYTES # BLD AUTO: 2.6 10E9/L (ref 0.8–5.3)
LYMPHOCYTES NFR BLD AUTO: 37.3 %
MCH RBC QN AUTO: 29.9 PG (ref 26.5–33)
MCHC RBC AUTO-ENTMCNC: 33.6 G/DL (ref 31.5–36.5)
MCV RBC AUTO: 89 FL (ref 78–100)
MICROALBUMIN UR-MCNC: 8 MG/L
MICROALBUMIN/CREAT UR: 4.7 MG/G CR (ref 0–17)
MONOCYTES # BLD AUTO: 0.6 10E9/L (ref 0–1.3)
MONOCYTES NFR BLD AUTO: 8.6 %
NEUTROPHILS # BLD AUTO: 3.5 10E9/L (ref 1.6–8.3)
NEUTROPHILS NFR BLD AUTO: 51.5 %
PLATELET # BLD AUTO: 172 10E9/L (ref 150–450)
POTASSIUM SERPL-SCNC: 4.5 MMOL/L (ref 3.4–5.3)
RBC # BLD AUTO: 4.88 10E12/L (ref 4.4–5.9)
SODIUM SERPL-SCNC: 135 MMOL/L (ref 133–144)
T3FREE SERPL-MCNC: 2.8 PG/ML (ref 2.3–4.2)
T4 FREE SERPL-MCNC: 0.87 NG/DL (ref 0.76–1.46)
TSH SERPL DL<=0.005 MIU/L-ACNC: 2.19 MU/L (ref 0.4–4)
WBC # BLD AUTO: 6.9 10E9/L (ref 4–11)

## 2020-07-29 PROCEDURE — 82043 UR ALBUMIN QUANTITATIVE: CPT | Performed by: INTERNAL MEDICINE

## 2020-07-29 PROCEDURE — 83036 HEMOGLOBIN GLYCOSYLATED A1C: CPT | Performed by: INTERNAL MEDICINE

## 2020-07-29 PROCEDURE — 80048 BASIC METABOLIC PNL TOTAL CA: CPT | Performed by: INTERNAL MEDICINE

## 2020-07-29 PROCEDURE — 36415 COLL VENOUS BLD VENIPUNCTURE: CPT | Performed by: INTERNAL MEDICINE

## 2020-07-29 PROCEDURE — 83695 ASSAY OF LIPOPROTEIN(A): CPT | Mod: 90 | Performed by: INTERNAL MEDICINE

## 2020-07-29 PROCEDURE — 80061 LIPID PANEL: CPT | Mod: 59 | Performed by: INTERNAL MEDICINE

## 2020-07-29 PROCEDURE — 84481 FREE ASSAY (FT-3): CPT | Performed by: INTERNAL MEDICINE

## 2020-07-29 PROCEDURE — 83704 LIPOPROTEIN BLD QUAN PART: CPT | Mod: 90 | Performed by: INTERNAL MEDICINE

## 2020-07-29 PROCEDURE — 85025 COMPLETE CBC W/AUTO DIFF WBC: CPT | Performed by: INTERNAL MEDICINE

## 2020-07-29 PROCEDURE — 86141 C-REACTIVE PROTEIN HS: CPT | Performed by: INTERNAL MEDICINE

## 2020-07-29 PROCEDURE — 84439 ASSAY OF FREE THYROXINE: CPT | Performed by: INTERNAL MEDICINE

## 2020-07-29 PROCEDURE — 99000 SPECIMEN HANDLING OFFICE-LAB: CPT | Performed by: INTERNAL MEDICINE

## 2020-07-29 PROCEDURE — 84443 ASSAY THYROID STIM HORMONE: CPT | Performed by: INTERNAL MEDICINE

## 2020-07-30 LAB — LPA SERPL-MCNC: 8 MG/DL

## 2020-07-31 LAB
CHOLEST SERPL-MCNC: 119 MG/DL
HDL SERPL QN: 8.3 NM
HDL SERPL-SCNC: 26.1 UMOL/L
HDLC SERPL-MCNC: 31 MG/DL (ref 40–59)
HLD.LARGE SERPL-SCNC: <2.8 UMOL/L
LDL SERPL QN: 20.4 NM
LDL SERPL-SCNC: 1145 NMOL/L
LDL SMALL SERPL-SCNC: 850 NMOL/L
LDLC SERPL CALC-MCNC: 61 MG/DL
PATHOLOGY STUDY: ABNORMAL
TRIGL SERPL-MCNC: 137 MG/DL (ref 30–149)
VLDL LARGE SERPL-SCNC: 2.4 NMOL/L
VLDL SERPL QN: 48 NM

## 2020-09-01 ENCOUNTER — OFFICE VISIT (OUTPATIENT)
Dept: SURGERY | Facility: CLINIC | Age: 70
End: 2020-09-01
Attending: INTERNAL MEDICINE
Payer: COMMERCIAL

## 2020-09-01 VITALS
DIASTOLIC BLOOD PRESSURE: 86 MMHG | OXYGEN SATURATION: 96 % | RESPIRATION RATE: 16 BRPM | HEIGHT: 71 IN | BODY MASS INDEX: 30.94 KG/M2 | SYSTOLIC BLOOD PRESSURE: 132 MMHG | HEART RATE: 75 BPM | WEIGHT: 221 LBS

## 2020-09-01 DIAGNOSIS — E78.5 HYPERLIPIDEMIA LDL GOAL <70: Primary | ICD-10-CM

## 2020-09-01 DIAGNOSIS — I10 ESSENTIAL HYPERTENSION: ICD-10-CM

## 2020-09-01 DIAGNOSIS — E11.9 TYPE 2 DIABETES MELLITUS TREATED WITHOUT INSULIN (H): ICD-10-CM

## 2020-09-01 DIAGNOSIS — E66.01 MORBID OBESITY (H): ICD-10-CM

## 2020-09-01 PROCEDURE — 99214 OFFICE O/P EST MOD 30 MIN: CPT | Performed by: INTERNAL MEDICINE

## 2020-09-01 ASSESSMENT — MIFFLIN-ST. JEOR: SCORE: 1789.58

## 2020-09-01 NOTE — PROGRESS NOTES
Dino Daniels is a 69 year old male who is presenting at the current time to discuss his diagnosi(es) of        Hyperlipidemia LDL goal <70  Type 2 diabetes mellitus treated without insulin (H)  Essential hypertension  Morbid obesity (H)     HPI: Dino Daniels is a 69 year old hypertensive hyperlipidemia type 2 diabetic without known CV ds who is presenting for lab f/u. He had Amaryl added on at his last visit. He had his A1c increase form 7.8 to 8.2 %. He has been active.    Review Of Systems  Skin: negative  Eyes: negative  Ears/Nose/Throat: negative  Respiratory: No shortness of breath, dyspnea on exertion, cough, or hemoptysis  Cardiovascular: negative  Gastrointestinal: negative  Genitourinary: negative  Musculoskeletal: negative  Neurologic: negative  Psychiatric: negative  Hematologic/Lymphatic/Immunologic: negative  Endocrine: negative    PAST MEDICAL HISTORY:                  Past Medical History:   Diagnosis Date     Arthritis     knees, left ankle and right thumb     Bladder stone      DEPRESSIVE DISORDER NEC 10/9/2006    resolved in 2009     Hypertension      Mumps      Other and unspecified hyperlipidemia     abstracted 7/17/02.     Type II or unspecified type diabetes mellitus without mention of complication, not stated as uncontrolled        PAST SURGICAL HISTORY:                  Past Surgical History:   Procedure Laterality Date     LASER HOLMIUM LITHOTRIPSY BLADDER  6/22/2012    Procedure: LASER HOLMIUM LITHOTRIPSY BLADDER;  Cystoscopy with Removal of Bladder Stone with Holmium Laser;  Surgeon: Chaz Cobb MD;  Location: [facility] OR     TESTICLE SURGERY       VASECTOMY       VASECTOMY       wisdom teeth[         CURRENT MEDICATIONS:                  Current Outpatient Medications   Medication Sig Dispense Refill     ASPIRIN PO Take 325 mg by mouth daily       atorvastatin (LIPITOR) 40 MG tablet Take 1 tablet (40 mg) by mouth daily 90 tablet 3     blood glucose calibration (CONTOUR  NEXT CONTROL LOW VI SOLN) Low solution Use to calibrate blood glucose monitor as directed. 1 Bottle 1     blood glucose calibration (CONTOUR NEXT CONTROL NORMAL VI SOLN) Normal solution Use to calibrate blood glucose monitor as needed as directed. 1 Bottle 1     blood glucose monitoring (CONTOUR NEXT MONITOR W/DEVICE KIT) meter device kit Use to test blood sugar 4 times daily or as directed. 1 kit 1     citalopram (CELEXA) 20 MG tablet Take 1 tablet (20 mg) by mouth daily 90 tablet 3     CONTOUR NEXT TEST test strip Use to test blood sugar 4 times daily. 300 each 3     finasteride (PROSCAR) 5 MG tablet Take 1 tablet (5 mg) by mouth daily 90 tablet 3     glimepiride (AMARYL) 2 MG tablet Take 1 tablet (2 mg) by mouth 2 times daily 180 tablet 3     KRILL OIL OR None Entered       lisinopril (PRINIVIL/ZESTRIL) 20 MG tablet Take 1 tablet (20 mg) by mouth daily 90 tablet 3     metFORMIN (GLUCOPHAGE) 1000 MG tablet TAKE ONE TABLET BY MOUTH TWICE DAILY 180 tablet 3     MICROLET LANCETS MISC Use to test blood sugar 4 times daily. 300 each 3     MULTIPLE VITAMINS CAPS   OR 1 capsule qd        ONETOUCH DELICA LANCETS 33G MISC TEST 4 TIMES DAILY 300 each 1     tamsulosin (FLOMAX) 0.4 MG capsule Take 1 capsule (0.4 mg) by mouth daily 90 capsule 3     zolpidem (AMBIEN) 10 MG tablet TAKE ONE TABLET BY MOUTH AT BEDTIME AS NEEDED FOR SLEEP 10 tablet 4       ALLERGIES:                  Allergies   Allergen Reactions     No Known Drug Allergies        SOCIAL HISTORY:                  Social History     Socioeconomic History     Marital status:      Spouse name: Not on file     Number of children: Not on file     Years of education: Not on file     Highest education level: Not on file   Occupational History     Not on file   Social Needs     Financial resource strain: Not on file     Food insecurity     Worry: Not on file     Inability: Not on file     Transportation needs     Medical: Not on file     Non-medical: Not on file    Tobacco Use     Smoking status: Never Smoker     Smokeless tobacco: Never Used   Substance and Sexual Activity     Alcohol use: Yes     Alcohol/week: 0.0 standard drinks     Comment: rarely     Drug use: No     Sexual activity: Not on file   Lifestyle     Physical activity     Days per week: Not on file     Minutes per session: Not on file     Stress: Not on file   Relationships     Social connections     Talks on phone: Not on file     Gets together: Not on file     Attends [employer] service: Not on file     Active member of club or organization: Not on file     Attends meetings of clubs or organizations: Not on file     Relationship status: Not on file     Intimate partner violence     Fear of current or ex partner: Not on file     Emotionally abused: Not on file     Physically abused: Not on file     Forced sexual activity: Not on file   Other Topics Concern     Parent/sibling w/ CABG, MI or angioplasty before 65F 55M? Not Asked   Social History Narrative     Not on file       FAMILY HISTORY:                   Family History   Problem Relation Age of Onset      () Father          age 56 leukemia     Cerebrovascular Disease Mother       () Brother         b,      () Sister         b, back problems      () Brother         b, back problems           Physical exam Reveals:    O/P: WNL  HEENT: WNL  NECK: No JVD, thyromegaly, or lymphadenopathy  HEART: RRR, no murmurs, gallops, or rubs  LUNGS: CTA bilaterally without rales, wheezes, or rhonchi  GI: NABS, nondistended, nontender, soft  EXT:without cyanosis, clubbing, or edema  NEURO: nonfocal  : no flank tenderness      Component      Latest Ref Rng & Units 2019   WBC      4.0 - 11.0 10e9/L  6.9   RBC Count      4.4 - 5.9 10e12/L  4.88   Hemoglobin      13.3 - 17.7 g/dL  14.6   Hematocrit      40.0 - 53.0 %  43.4   MCV      78 - 100 fl  89   MCH      26.5 - 33.0 pg  29.9   MCHC      31.5 - 36.5 g/dL  33.6   RDW      10.0 - 15.0 %   13.6   Platelet Count      150 - 450 10e9/L  172   % Neutrophils      %  51.5   % Lymphocytes      %  37.3   % Monocytes      %  8.6   % Eosinophils      %  1.9   % Basophils      %  0.7   Absolute Neutrophil      1.6 - 8.3 10e9/L  3.5   Absolute Lymphocytes      0.8 - 5.3 10e9/L  2.6   Absolute Monocytes      0.0 - 1.3 10e9/L  0.6   Absolute Eosinophils      0.0 - 0.7 10e9/L  0.1   Absolute Basophils      0.0 - 0.2 10e9/L  0.1   Diff Method        Automated Method   Total Cholesterol      <=199 mg/dL  119   Triglycerides      30 - 149 mg/dL 129 137   HDL Cholesterol      40 - 59 mg/dL  31 (L)   LDL Cholesterol      <=129 mg/dL  61   HDL Size      >=8.9 nm  8.3 (L)   VLDL Size      <=46.7 nm  48.0 (H)   LDL Particle Size      >=20.7 nm  20.4 (L)   Lge HDL Particle number      >=4.2 umol/L  <2.8 (L)   HDL Particle Number NMR      >=33.0 umol/L  26.1 (L)   Lge VLDL Part number      <=2.7 nmol/L  2.4   Small LDL Particle number      <=634 nmol/L  850 (H)   LDL Particle Number      <=1,135 nmol/L  1,145 (H)   EER LipoFit by NMR        SEE NOTE   Sodium      133 - 144 mmol/L 137 135   Potassium      3.4 - 5.3 mmol/L 4.4 4.5   Chloride      94 - 109 mmol/L 109 106   Carbon Dioxide      20 - 32 mmol/L 20 23   Anion Gap      3 - 14 mmol/L 8 6   Glucose      70 - 99 mg/dL 219 (H) 217 (H)   Urea Nitrogen      7 - 30 mg/dL 24 21   Creatinine      0.66 - 1.25 mg/dL 0.88 0.89   GFR Estimate      >60 mL/min/1.73:m2 87 87   GFR Estimate If Black      >60 mL/min/1.73:m2 >90 >90   Calcium      8.5 - 10.1 mg/dL 9.3 9.2   Bilirubin Direct      0.0 - 0.2 mg/dL 0.1    Bilirubin Total      0.2 - 1.3 mg/dL 0.5    Albumin      3.4 - 5.0 g/dL 4.2    Protein Total      6.8 - 8.8 g/dL 7.8    Alkaline Phosphatase      40 - 150 U/L 41    ALT      0 - 70 U/L 45    AST      0 - 45 U/L 22    Cholesterol      <200 mg/dL 123    HDL Cholesterol      >39 mg/dL 31 (L)    LDL Cholesterol Calculated      <100 mg/dL 66    Non HDL Cholesterol      <130  mg/dL 92    Creatinine Urine      mg/dL 151 174   Albumin Urine mg/L      mg/L 8 8   Albumin Urine mg/g Cr      0 - 17 mg/g Cr 5.34 4.70   PSA      0 - 4 ug/L 2.44    Free T3      2.3 - 4.2 pg/mL 2.3 2.8   T4 Free      0.76 - 1.46 ng/dL 0.83 0.87   TSH      0.40 - 4.00 mU/L 1.98 2.19   Hemoglobin A1C      0 - 5.6 % 7.8 (H) 8.2 (H)   Lipoprotein (a)      <=29 mg/dL  8   CRP Cardiac Risk      mg/L  0.6       Body mass index is 30.82 kg/m .     A/P:    (E78.5) Hyperlipidemia LDL goal <70  (primary encounter diagnosis)  Comment: at LDL-C goal but not LDL-P goal due to poorly controlled DM2  Plan: get DM2 under better control, repeat labs in three months, consider eventual zetia if fails to achieve lipemic perfection.    (E11.9) Type 2 diabetes mellitus treated without insulin (H)  Comment: I advised adding Jardiance or Victoza. He declines to do so.  Plan: Increase dietary and lifestyle behaviors, repeat labs in three months.    (I10) Essential hypertension  Comment: not at goal  Plan: lose weight    (E66.01) Morbid obesity (H)  Comment: Body mass index is 30.82 kg/m .   Plan: lose weight    Greater than one half the twenty five minutes total spent on the pt's visit were spent providing education and counselling to the patient regarding the above matters.

## 2020-10-08 ENCOUNTER — MYC MEDICAL ADVICE (OUTPATIENT)
Dept: SURGERY | Facility: CLINIC | Age: 70
End: 2020-10-08

## 2020-11-30 DIAGNOSIS — F51.11 PRIMARY HYPERSOMNIA: ICD-10-CM

## 2020-11-30 RX ORDER — ZOLPIDEM TARTRATE 10 MG/1
TABLET ORAL
Qty: 10 TABLET | Refills: 4 | Status: SHIPPED | OUTPATIENT
Start: 2020-11-30 | End: 2021-07-07

## 2020-11-30 NOTE — TELEPHONE ENCOUNTER
zolpidem (AMBIEN) 10 MG tablet  Last Written Prescription Date:  12/18/19  Last Fill Quantity: 10,  # refills: 4     Last office visit: 9/1/20  Follow up due:  December    Unable to fill per [facility] protocol.  Medication and pharmacy loaded.

## 2020-12-08 DIAGNOSIS — E66.01 MORBID OBESITY (H): ICD-10-CM

## 2020-12-08 DIAGNOSIS — E78.5 HYPERLIPIDEMIA LDL GOAL <70: ICD-10-CM

## 2020-12-08 DIAGNOSIS — I10 ESSENTIAL HYPERTENSION: ICD-10-CM

## 2020-12-08 DIAGNOSIS — E11.9 TYPE 2 DIABETES MELLITUS TREATED WITHOUT INSULIN (H): ICD-10-CM

## 2020-12-08 LAB
ALBUMIN SERPL-MCNC: 4.2 G/DL (ref 3.4–5)
ALP SERPL-CCNC: 41 U/L (ref 40–150)
ALT SERPL W P-5'-P-CCNC: 41 U/L (ref 0–70)
ANION GAP SERPL CALCULATED.3IONS-SCNC: 6 MMOL/L (ref 3–14)
AST SERPL W P-5'-P-CCNC: 18 U/L (ref 0–45)
BASOPHILS # BLD AUTO: 0 10E9/L (ref 0–0.2)
BASOPHILS NFR BLD AUTO: 0.6 %
BILIRUB DIRECT SERPL-MCNC: 0.1 MG/DL (ref 0–0.2)
BILIRUB SERPL-MCNC: 0.5 MG/DL (ref 0.2–1.3)
BUN SERPL-MCNC: 26 MG/DL (ref 7–30)
CALCIUM SERPL-MCNC: 9.1 MG/DL (ref 8.5–10.1)
CHLORIDE SERPL-SCNC: 108 MMOL/L (ref 94–109)
CO2 SERPL-SCNC: 22 MMOL/L (ref 20–32)
CREAT SERPL-MCNC: 0.93 MG/DL (ref 0.66–1.25)
CREAT UR-MCNC: 101 MG/DL
CRP SERPL HS-MCNC: 0.5 MG/L
DIFFERENTIAL METHOD BLD: NORMAL
EOSINOPHIL # BLD AUTO: 0.1 10E9/L (ref 0–0.7)
EOSINOPHIL NFR BLD AUTO: 2.6 %
ERYTHROCYTE [DISTWIDTH] IN BLOOD BY AUTOMATED COUNT: 13.6 % (ref 10–15)
GFR SERPL CREATININE-BSD FRML MDRD: 83 ML/MIN/{1.73_M2}
GLUCOSE SERPL-MCNC: 172 MG/DL (ref 70–99)
HBA1C MFR BLD: 6.9 % (ref 0–5.6)
HCT VFR BLD AUTO: 43.3 % (ref 40–53)
HGB BLD-MCNC: 15 G/DL (ref 13.3–17.7)
LYMPHOCYTES # BLD AUTO: 2.1 10E9/L (ref 0.8–5.3)
LYMPHOCYTES NFR BLD AUTO: 39.3 %
MCH RBC QN AUTO: 30.2 PG (ref 26.5–33)
MCHC RBC AUTO-ENTMCNC: 34.6 G/DL (ref 31.5–36.5)
MCV RBC AUTO: 87 FL (ref 78–100)
MICROALBUMIN UR-MCNC: 5 MG/L
MICROALBUMIN/CREAT UR: 5.12 MG/G CR (ref 0–17)
MONOCYTES # BLD AUTO: 0.4 10E9/L (ref 0–1.3)
MONOCYTES NFR BLD AUTO: 7.5 %
NEUTROPHILS # BLD AUTO: 2.7 10E9/L (ref 1.6–8.3)
NEUTROPHILS NFR BLD AUTO: 50 %
PLATELET # BLD AUTO: 167 10E9/L (ref 150–450)
POTASSIUM SERPL-SCNC: 4.3 MMOL/L (ref 3.4–5.3)
PROT SERPL-MCNC: 7.9 G/DL (ref 6.8–8.8)
RBC # BLD AUTO: 4.96 10E12/L (ref 4.4–5.9)
SODIUM SERPL-SCNC: 136 MMOL/L (ref 133–144)
T3FREE SERPL-MCNC: 2.4 PG/ML (ref 2.3–4.2)
T4 FREE SERPL-MCNC: 0.78 NG/DL (ref 0.76–1.46)
TSH SERPL DL<=0.005 MIU/L-ACNC: 2.22 MU/L (ref 0.4–4)
WBC # BLD AUTO: 5.4 10E9/L (ref 4–11)

## 2020-12-08 PROCEDURE — 82043 UR ALBUMIN QUANTITATIVE: CPT | Performed by: INTERNAL MEDICINE

## 2020-12-08 PROCEDURE — 86141 C-REACTIVE PROTEIN HS: CPT | Performed by: INTERNAL MEDICINE

## 2020-12-08 PROCEDURE — 85025 COMPLETE CBC W/AUTO DIFF WBC: CPT | Performed by: INTERNAL MEDICINE

## 2020-12-08 PROCEDURE — 84481 FREE ASSAY (FT-3): CPT | Performed by: INTERNAL MEDICINE

## 2020-12-08 PROCEDURE — 80048 BASIC METABOLIC PNL TOTAL CA: CPT | Performed by: INTERNAL MEDICINE

## 2020-12-08 PROCEDURE — 84443 ASSAY THYROID STIM HORMONE: CPT | Performed by: INTERNAL MEDICINE

## 2020-12-08 PROCEDURE — 80076 HEPATIC FUNCTION PANEL: CPT | Performed by: INTERNAL MEDICINE

## 2020-12-08 PROCEDURE — 83704 LIPOPROTEIN BLD QUAN PART: CPT | Mod: 90 | Performed by: INTERNAL MEDICINE

## 2020-12-08 PROCEDURE — 83036 HEMOGLOBIN GLYCOSYLATED A1C: CPT | Performed by: INTERNAL MEDICINE

## 2020-12-08 PROCEDURE — 83695 ASSAY OF LIPOPROTEIN(A): CPT | Mod: 90 | Performed by: INTERNAL MEDICINE

## 2020-12-08 PROCEDURE — 99000 SPECIMEN HANDLING OFFICE-LAB: CPT | Performed by: INTERNAL MEDICINE

## 2020-12-08 PROCEDURE — 84439 ASSAY OF FREE THYROXINE: CPT | Performed by: INTERNAL MEDICINE

## 2020-12-08 PROCEDURE — 36415 COLL VENOUS BLD VENIPUNCTURE: CPT | Performed by: INTERNAL MEDICINE

## 2020-12-09 LAB — LPA SERPL-MCNC: 11 MG/DL

## 2020-12-10 LAB
CHOLEST SERPL-MCNC: 117 MG/DL
HDL SERPL QN: 8.1 NM
HDL SERPL-SCNC: 24.4 UMOL/L
HDLC SERPL-MCNC: 29 MG/DL (ref 40–59)
HLD.LARGE SERPL-SCNC: <2.8 UMOL/L
LDL SERPL QN: 20.5 NM
LDL SERPL-SCNC: 1066 NMOL/L
LDL SMALL SERPL-SCNC: 858 NMOL/L
LDLC SERPL CALC-MCNC: 62 MG/DL
PATHOLOGY STUDY: ABNORMAL
TRIGL SERPL-MCNC: 131 MG/DL (ref 30–149)
VLDL LARGE SERPL-SCNC: 1.8 NMOL/L
VLDL SERPL QN: 49.7 NM

## 2020-12-15 DIAGNOSIS — E11.00 TYPE 2 DIABETES MELLITUS WITH HYPEROSMOLARITY WITHOUT COMA, WITHOUT LONG-TERM CURRENT USE OF INSULIN (H): ICD-10-CM

## 2020-12-15 RX ORDER — GLIMEPIRIDE 2 MG/1
2 TABLET ORAL 2 TIMES DAILY
Qty: 180 TABLET | Refills: 0 | Status: SHIPPED | OUTPATIENT
Start: 2020-12-15 | End: 2021-01-05

## 2020-12-15 NOTE — TELEPHONE ENCOUNTER
"glimepiride (AMARYL) 2 MG tablet  Last Written Prescription Date:  12/18/19  Last Fill Quantity: 180,  # refills: 3    metFORMIN (GLUCOPHAGE) 1000 MG tablet  Last Written Prescription Date:  12/18/19  Last Fill Quantity: 90,  # refills: 3    Last office visit: 12/18/2019   Office visit scheduled: 1/4/21    Requested Prescriptions   Pending Prescriptions Disp Refills     metFORMIN (GLUCOPHAGE) 1000 MG tablet 180 tablet 0     Sig: TAKE ONE TABLET BY MOUTH TWICE DAILY       Biguanide Agents Passed - 12/15/2020  2:17 PM        Passed - Patient is age 10 or older        Passed - Patient has documented A1c within the specified period of time.     If HgbA1C is 8 or greater, it needs to be on file within the past 3 months.  If less than 8, must be on file within the past 6 months.     Recent Labs   Lab Test 12/08/20  0712   A1C 6.9*             Passed - Patient's CR is NOT>1.4 OR Patient's EGFR is NOT<45 within past 12 mos.     Recent Labs   Lab Test 12/08/20  0712   GFRESTIMATED 83   GFRESTBLACK >90       Recent Labs   Lab Test 12/08/20  0712   CR 0.93             Passed - Patient does NOT have a diagnosis of CHF.        Passed - Medication is active on med list        Passed - Recent (6 mo) or future (30 days) visit within the authorizing provider's specialty     Patient had office visit in the last 6 months or has a visit in the next 30 days with authorizing provider or within the authorizing provider's specialty.  See \"Patient Info\" tab in inbasket, or \"Choose Columns\" in Meds & Orders section of the refill encounter.               glimepiride (AMARYL) 2 MG tablet 180 tablet 0     Sig: Take 1 tablet (2 mg) by mouth 2 times daily       Sulfonylurea Agents Passed - 12/15/2020  2:17 PM        Passed - Patient has documented A1c within the specified period of time.     If HgbA1C is 8 or greater, it needs to be on file within the past 3 months.  If less than 8, must be on file within the past 6 months.     Recent Labs   Lab " "Test 12/08/20 0712   A1C 6.9*             Passed - Medication is active on med list        Passed - Patient is age 18 or older        Passed - Patient has a recent creatinine (normal) within the past 12 mos.     Recent Labs   Lab Test 12/08/20 0712   CR 0.93       Ok to refill medication if creatinine is low          Passed - Recent (6 mo) or future (30 days) visit within the authorizing provider's specialty     Patient had office visit in the last 6 months or has a visit in the next 30 days with authorizing provider or within the authorizing provider's specialty.  See \"Patient Info\" tab in inbasket, or \"Choose Columns\" in Meds & Orders section of the refill encounter.               Prescription approved per [facility] Refill Protocol.  Lorna Huffman RN BSN  [facility]  [phone]                  "

## 2020-12-29 NOTE — PROGRESS NOTES
#[phone] video visit; Follow up to 9/1/20. Fasting labs December 2020. Labs completed 12/8/2020.  1/5/2021 first tele visit available    New PCP?  The patient has been advised that Dr. Fernandez's practice is changing to a purely consultative Vascular Medicine practice. As such, the patient is advised to find a new primary care provider.  Dr. Fernandez will continue to provide care for vascular risk factors including diabetes, hypertension, cholesterol management, aneurysmal, or thrombosis related issues.     Patient understands the need to establish care with a new provider for routine screenings, ill visits, referrals, and other non-vascular health care issues.

## 2021-01-05 ENCOUNTER — VIRTUAL VISIT (OUTPATIENT)
Dept: OTHER | Facility: CLINIC | Age: 71
End: 2021-01-05
Attending: INTERNAL MEDICINE
Payer: COMMERCIAL

## 2021-01-05 DIAGNOSIS — I10 ESSENTIAL HYPERTENSION: ICD-10-CM

## 2021-01-05 DIAGNOSIS — E11.9 TYPE 2 DIABETES MELLITUS TREATED WITHOUT INSULIN (H): ICD-10-CM

## 2021-01-05 DIAGNOSIS — I10 ESSENTIAL HYPERTENSION WITH GOAL BLOOD PRESSURE LESS THAN 130/80: ICD-10-CM

## 2021-01-05 DIAGNOSIS — E11.00 TYPE 2 DIABETES MELLITUS WITH HYPEROSMOLARITY WITHOUT COMA, WITHOUT LONG-TERM CURRENT USE OF INSULIN (H): ICD-10-CM

## 2021-01-05 DIAGNOSIS — E78.5 HYPERLIPIDEMIA LDL GOAL <100: ICD-10-CM

## 2021-01-05 DIAGNOSIS — R33.9 URINARY RETENTION: ICD-10-CM

## 2021-01-05 DIAGNOSIS — F32.5 MAJOR DEPRESSIVE DISORDER, SINGLE EPISODE IN FULL REMISSION (H): ICD-10-CM

## 2021-01-05 DIAGNOSIS — E78.5 HYPERLIPIDEMIA LDL GOAL <70: ICD-10-CM

## 2021-01-05 PROCEDURE — 99214 OFFICE O/P EST MOD 30 MIN: CPT | Mod: 95 | Performed by: INTERNAL MEDICINE

## 2021-01-05 RX ORDER — LISINOPRIL 20 MG/1
20 TABLET ORAL DAILY
Qty: 90 TABLET | Refills: 3 | Status: SHIPPED | OUTPATIENT
Start: 2021-01-05 | End: 2021-08-10

## 2021-01-05 RX ORDER — ATORVASTATIN CALCIUM 40 MG/1
40 TABLET, FILM COATED ORAL DAILY
Qty: 90 TABLET | Refills: 3 | Status: SHIPPED | OUTPATIENT
Start: 2021-01-05 | End: 2022-06-14

## 2021-01-05 RX ORDER — TAMSULOSIN HYDROCHLORIDE 0.4 MG/1
0.4 CAPSULE ORAL DAILY
Qty: 90 CAPSULE | Refills: 3 | Status: SHIPPED | OUTPATIENT
Start: 2021-01-05 | End: 2021-08-10

## 2021-01-05 RX ORDER — GLIMEPIRIDE 2 MG/1
2 TABLET ORAL 2 TIMES DAILY
Qty: 180 TABLET | Refills: 0 | Status: SHIPPED | OUTPATIENT
Start: 2021-01-05 | End: 2021-06-15

## 2021-01-05 RX ORDER — FINASTERIDE 5 MG/1
5 TABLET, FILM COATED ORAL DAILY
Qty: 90 TABLET | Refills: 3 | Status: SHIPPED | OUTPATIENT
Start: 2021-01-05 | End: 2021-08-10

## 2021-01-05 RX ORDER — CITALOPRAM HYDROBROMIDE 20 MG/1
20 TABLET ORAL DAILY
Qty: 90 TABLET | Refills: 3 | Status: SHIPPED | OUTPATIENT
Start: 2021-01-05 | End: 2021-08-10

## 2021-01-05 NOTE — PROGRESS NOTES
Dino Daniels is a 70 year old male who is presenting at the current time to discuss his diagnosi(es) of        Hyperlipidemia LDL goal <70  Type 2 diabetes mellitus treated without insulin (H)  Essential hypertension  Hyperlipidemia LDL goal <100  Major depressive disorder, single episode in full remission (H)  Urinary retention  Essential hypertension with goal blood pressure less than 130/80  Type 2 diabetes mellitus with hyperosmolarity without coma, without long-term current use of insulin (H)  .      HPI: Dino Daniels is a 70 year old hypertensive hyperlipidemia type 2 diabetic without known CV ds who is presenting for lab f/u. He had Amaryl added on at his second to last visit. He had his A1c increase form 7.8 to 8.2 %. I advised adding Jardiance or Victoza. He declined to do so. The plan was therfore to increase dietary and lifestyle behaviors, repeat labs and return for f/u presnetly. He has  been active. His A1C has now fallen from 8.2 to 6.9%. His LDL is < 70. He feels well.     Review Of Systems  Skin: negative  Eyes: negative  Ears/Nose/Throat: negative  Respiratory: No shortness of breath, dyspnea on exertion, cough, or hemoptysis  Cardiovascular: negative  Gastrointestinal: negative  Genitourinary: negative  Musculoskeletal: negative  Neurologic: negative  Psychiatric: negative  Hematologic/Lymphatic/Immunologic: negative  Endocrine: negative      PAST MEDICAL HISTORY:                  Past Medical History:   Diagnosis Date     Arthritis     knees, left ankle and right thumb     Bladder stone      DEPRESSIVE DISORDER NEC 10/9/2006    resolved in 2009     Hypertension      Mumps      Other and unspecified hyperlipidemia     abstracted 7/17/02.     Type II or unspecified type diabetes mellitus without mention of complication, not stated as uncontrolled        PAST SURGICAL HISTORY:                  Past Surgical History:   Procedure Laterality Date     LASER HOLMIUM LITHOTRIPSY BLADDER  6/22/2012     Procedure: LASER HOLMIUM LITHOTRIPSY BLADDER;  Cystoscopy with Removal of Bladder Stone with Holmium Laser;  Surgeon: Chaz Cobb MD;  Location: [facility] OR     TESTICLE SURGERY       VASECTOMY       VASECTOMY       wisdom teeth[         CURRENT MEDICATIONS:                  Current Outpatient Medications   Medication Sig Dispense Refill     ASPIRIN PO Take 325 mg by mouth daily       atorvastatin (LIPITOR) 40 MG tablet Take 1 tablet (40 mg) by mouth daily 90 tablet 3     blood glucose calibration (CONTOUR NEXT CONTROL LOW VI SOLN) Low solution Use to calibrate blood glucose monitor as directed. 1 Bottle 1     blood glucose calibration (CONTOUR NEXT CONTROL NORMAL VI SOLN) Normal solution Use to calibrate blood glucose monitor as needed as directed. 1 Bottle 1     blood glucose monitoring (CONTOUR NEXT MONITOR W/DEVICE KIT) meter device kit Use to test blood sugar 4 times daily or as directed. 1 kit 1     citalopram (CELEXA) 20 MG tablet Take 1 tablet (20 mg) by mouth daily 90 tablet 3     CONTOUR NEXT TEST test strip Use to test blood sugar 4 times daily. 300 each 3     finasteride (PROSCAR) 5 MG tablet Take 1 tablet (5 mg) by mouth daily 90 tablet 3     glimepiride (AMARYL) 2 MG tablet Take 1 tablet (2 mg) by mouth 2 times daily 180 tablet 0     KRILL OIL OR None Entered       lisinopril (PRINIVIL/ZESTRIL) 20 MG tablet Take 1 tablet (20 mg) by mouth daily 90 tablet 3     metFORMIN (GLUCOPHAGE) 1000 MG tablet TAKE ONE TABLET BY MOUTH TWICE DAILY 180 tablet 0     MICROLET LANCETS MISC Use to test blood sugar 4 times daily. 300 each 3     MULTIPLE VITAMINS CAPS   OR 1 capsule qd        ONETOUCH DELICA LANCETS 33G MISC TEST 4 TIMES DAILY 300 each 1     tamsulosin (FLOMAX) 0.4 MG capsule Take 1 capsule (0.4 mg) by mouth daily 90 capsule 3     zolpidem (AMBIEN) 10 MG tablet TAKE ONE TABLET BY MOUTH AT BEDTIME AS NEEDED FOR SLEEP 10 tablet 4       ALLERGIES:                  Allergies   Allergen Reactions     No  Known Drug Allergies        SOCIAL HISTORY:                  Social History     Socioeconomic History     Marital status:      Spouse name: Not on file     Number of children: Not on file     Years of education: Not on file     Highest education level: Not on file   Occupational History     Not on file   Social Needs     Financial resource strain: Not on file     Food insecurity     Worry: Not on file     Inability: Not on file     Transportation needs     Medical: Not on file     Non-medical: Not on file   Tobacco Use     Smoking status: Never Smoker     Smokeless tobacco: Never Used   Substance and Sexual Activity     Alcohol use: Yes     Alcohol/week: 0.0 standard drinks     Comment: rarely     Drug use: No     Sexual activity: Not on file   Lifestyle     Physical activity     Days per week: Not on file     Minutes per session: Not on file     Stress: Not on file   Relationships     Social connections     Talks on phone: Not on file     Gets together: Not on file     Attends [employer] service: Not on file     Active member of club or organization: Not on file     Attends meetings of clubs or organizations: Not on file     Relationship status: Not on file     Intimate partner violence     Fear of current or ex partner: Not on file     Emotionally abused: Not on file     Physically abused: Not on file     Forced sexual activity: Not on file   Other Topics Concern     Parent/sibling w/ CABG, MI or angioplasty before 65F 55M? Not Asked   Social History Narrative     Not on file       FAMILY HISTORY:                   Family History   Problem Relation Age of Onset      () Father          age 56 leukemia     Cerebrovascular Disease Mother       () Brother         b,1952      () Sister         b,4 back problems      () Brother         b,1955 back problems         Physical exam was not performed as it was a COVID mandated video visit.    Component      Latest Ref Rng & Units 2020   WBC       4.0 - 11.0 10e9/L 6.9 5.4   RBC Count      4.4 - 5.9 10e12/L 4.88 4.96   Hemoglobin      13.3 - 17.7 g/dL 14.6 15.0   Hematocrit      40.0 - 53.0 % 43.4 43.3   MCV      78 - 100 fl 89 87   MCH      26.5 - 33.0 pg 29.9 30.2   MCHC      31.5 - 36.5 g/dL 33.6 34.6   RDW      10.0 - 15.0 % 13.6 13.6   Platelet Count      150 - 450 10e9/L 172 167   % Neutrophils      % 51.5 50.0   % Lymphocytes      % 37.3 39.3   % Monocytes      % 8.6 7.5   % Eosinophils      % 1.9 2.6   % Basophils      % 0.7 0.6   Absolute Neutrophil      1.6 - 8.3 10e9/L 3.5 2.7   Absolute Lymphocytes      0.8 - 5.3 10e9/L 2.6 2.1   Absolute Monocytes      0.0 - 1.3 10e9/L 0.6 0.4   Absolute Eosinophils      0.0 - 0.7 10e9/L 0.1 0.1   Absolute Basophils      0.0 - 0.2 10e9/L 0.1 0.0   Diff Method       Automated Method Automated Method   Total Cholesterol      <=199 mg/dL 119 117   Triglycerides      30 - 149 mg/dL 137 131   HDL Cholesterol      40 - 59 mg/dL 31 (L) 29 (L)   LDL Cholesterol      <=129 mg/dL 61 62   HDL Size      >=8.9 nm 8.3 (L) 8.1 (L)   VLDL Size      <=46.7 nm 48.0 (H) 49.7 (H)   LDL Particle Size      >=20.7 nm 20.4 (L) 20.5 (L)   Lge HDL Particle number      >=4.2 umol/L <2.8 (L) <2.8 (L)   HDL Particle Number NMR      >=33.0 umol/L 26.1 (L) 24.4 (L)   Lge VLDL Part number      <=2.7 nmol/L 2.4 1.8   Small LDL Particle number      <=634 nmol/L 850 (H) 858 (H)   LDL Particle Number      <=1,135 nmol/L 1,145 (H) 1,066   EER LipoFit by NMR       SEE NOTE SEE NOTE   Sodium      133 - 144 mmol/L 135 136   Potassium      3.4 - 5.3 mmol/L 4.5 4.3   Chloride      94 - 109 mmol/L 106 108   Carbon Dioxide      20 - 32 mmol/L 23 22   Anion Gap      3 - 14 mmol/L 6 6   Glucose      70 - 99 mg/dL 217 (H) 172 (H)   Urea Nitrogen      7 - 30 mg/dL 21 26   Creatinine      0.66 - 1.25 mg/dL 0.89 0.93   GFR Estimate      >60 mL/min/1.73:m2 87 83   GFR Estimate If Black      >60 mL/min/1.73:m2 >90 >90   Calcium      8.5 - 10.1 mg/dL 9.2 9.1    Bilirubin Direct      0.0 - 0.2 mg/dL  0.1   Bilirubin Total      0.2 - 1.3 mg/dL  0.5   Albumin      3.4 - 5.0 g/dL  4.2   Protein Total      6.8 - 8.8 g/dL  7.9   Alkaline Phosphatase      40 - 150 U/L  41   ALT      0 - 70 U/L  41   AST      0 - 45 U/L  18   Creatinine Urine      mg/dL 174 101   Albumin Urine mg/L      mg/L 8 5   Albumin Urine mg/g Cr      0 - 17 mg/g Cr 4.70 5.12   Lipoprotein (a)      <=29 mg/dL 8 11   CRP Cardiac Risk      mg/L 0.6 0.5   Hemoglobin A1C      0 - 5.6 % 8.2 (H) 6.9 (H)   TSH      0.40 - 4.00 mU/L 2.19 2.22   Free T3      2.3 - 4.2 pg/mL 2.8 2.4   T4 Free      0.76 - 1.46 ng/dL 0.87 0.78     A/P:    (E78.5) Hyperlipidemia LDL goal <70  Comment: at goal  Plan: LipoFit by NMR, T4 free, T3 Free, TSH        , repeat labs in three months    (E11.9) Type 2 diabetes mellitus treated without insulin (H)  Comment: he improved a1c with dietary measures  Plan: Hemoglobin A1c, Albumin Random Urine         Quantitative with Creat Ratio, aspirin (ASA) 81        MG EC tablet        He wants a Dexcom unit. I advised he look into which one he wants and told him to send me a [employer] message as to the one he wants     (I10) Essential hypertension  Comment: at goal  Plan: Basic metabolic panel           (F32.5) Major depressive disorder, single episode in full remission (H)  Comment: controlled  Plan: citalopram (CELEXA) 20 MG tablet        Needs the below    (R33.9) Urinary retention  Comment:needs the below  Plan: finasteride (PROSCAR) 5 MG tablet, tamsulosin         (FLOMAX) 0.4 MG capsule            (I10) Essential hypertension with goal blood pressure less than 130/80  Comment: at goal  Plan: lisinopril (ZESTRIL) 20 MG tablet           (E11.00) Type 2 diabetes mellitus with A1C goal < 7 %  Comment: as above  Plan: metFORMIN (GLUCOPHAGE) 1000 MG tablet,         glimepiride (AMARYL) 2 MG tablet              Video start: 13:25  Video end: 13:50     Greater than one half the twenty five minutes  total spent on the pt's visit were spent providing education and counselling to the patient regarding the above matters.

## 2021-01-05 NOTE — PROGRESS NOTES
Dino Daniels is a 70 year old male who is being evaluated via a billable video visit.      How would you like to obtain your AVS? [employer]  If the video visit is dropped, the invitation should be resent by: Text to cell phone: [phone]  Will anyone else be joining your video visit? No      Video Start Time:

## 2021-01-23 ENCOUNTER — HEALTH MAINTENANCE LETTER (OUTPATIENT)
Age: 71
End: 2021-01-23

## 2021-05-19 ENCOUNTER — TRANSFERRED RECORDS (OUTPATIENT)
Dept: HEALTH INFORMATION MANAGEMENT | Facility: CLINIC | Age: 71
End: 2021-05-19

## 2021-06-13 DIAGNOSIS — E11.00 TYPE 2 DIABETES MELLITUS WITH HYPEROSMOLARITY WITHOUT COMA, WITHOUT LONG-TERM CURRENT USE OF INSULIN (H): ICD-10-CM

## 2021-06-14 NOTE — TELEPHONE ENCOUNTER
glimepiride (AMARYL) 2 MG tablet  Last Written Prescription Date:  1/15/21  Last Fill Quantity: 180,  # refills: 0    metFORMIN (GLUCOPHAGE) 1000 MG tablet  Last Written Prescription Date:  1/5/21  Last Fill Quantity: 180,  # refills: 0    Last office visit 1/5/21  Follow up due: July 2021    Unable to fill per [facility] protocol.  Medication and pharmacy loaded.    Lorna Huffman RN BSN  [facility]  [phone]

## 2021-06-15 RX ORDER — GLIMEPIRIDE 2 MG/1
2 TABLET ORAL 2 TIMES DAILY
Qty: 180 TABLET | Refills: 0 | Status: SHIPPED | OUTPATIENT
Start: 2021-06-15 | End: 2021-07-02

## 2021-06-16 ENCOUNTER — TRANSFERRED RECORDS (OUTPATIENT)
Dept: HEALTH INFORMATION MANAGEMENT | Facility: CLINIC | Age: 71
End: 2021-06-16

## 2021-07-01 DIAGNOSIS — E11.00 TYPE 2 DIABETES MELLITUS WITH HYPEROSMOLARITY WITHOUT COMA, WITHOUT LONG-TERM CURRENT USE OF INSULIN (H): ICD-10-CM

## 2021-07-02 RX ORDER — GLIMEPIRIDE 2 MG/1
2 TABLET ORAL 2 TIMES DAILY
Qty: 180 TABLET | Refills: 3 | Status: SHIPPED | OUTPATIENT
Start: 2021-07-02 | End: 2021-08-10

## 2021-07-02 NOTE — TELEPHONE ENCOUNTER
Unable to refill per [facility] protocol.  Med and pharm loaded.    Nancy HEADLEYN, RN    [facility]  [facility]  Office: [phone]  Fax: [phone]

## 2021-07-06 ENCOUNTER — MYC MEDICAL ADVICE (OUTPATIENT)
Dept: SURGERY | Facility: CLINIC | Age: 71
End: 2021-07-06

## 2021-07-06 ENCOUNTER — TRANSFERRED RECORDS (OUTPATIENT)
Dept: HEALTH INFORMATION MANAGEMENT | Facility: CLINIC | Age: 71
End: 2021-07-06

## 2021-07-06 DIAGNOSIS — E11.9 TYPE 2 DIABETES MELLITUS WITH HEMOGLOBIN A1C GOAL OF 7.0%-8.0% (H): ICD-10-CM

## 2021-07-06 DIAGNOSIS — F51.11 PRIMARY HYPERSOMNIA: ICD-10-CM

## 2021-07-06 LAB — RETINOPATHY: NEGATIVE

## 2021-07-07 RX ORDER — LANCETS
EACH MISCELLANEOUS
Qty: 300 EACH | Refills: 3 | Status: SHIPPED | OUTPATIENT
Start: 2021-07-07 | End: 2022-06-14

## 2021-07-07 RX ORDER — ZOLPIDEM TARTRATE 10 MG/1
TABLET ORAL
Qty: 10 TABLET | Refills: 4 | Status: SHIPPED | OUTPATIENT
Start: 2021-07-07 | End: 2021-08-10

## 2021-07-07 NOTE — TELEPHONE ENCOUNTER
CONTOUR NEXT TEST test strip  Last Written Prescription Date:  9/16/20  Last Fill Quantity: 300,  # refills: 3    MICROLET LANCETS MISC  Last Written Prescription Date:  9/16/19  Last Fill Quantity: 300,  # refills: 3    zolpidem (AMBIEN) 10 MG tablet  Last Written Prescription Date:  11/30/20  Last Fill Quantity: 10,  # refills: 4     Last office visit: 9/1/2020     Future Office Visit:   Next 5 appointments (look out 90 days)    Aug 10, 2021  3:00 PM  Return Visit with Abel Fernandez MD  [facility] ([facility]) [address]  [facility] [address] [phone]         Unable to fill per [facility] protocol.  Medication and pharmacy loaded.    Lorna Huffman RN BSN  [facility]  [phone]

## 2021-07-10 ENCOUNTER — HEALTH MAINTENANCE LETTER (OUTPATIENT)
Age: 71
End: 2021-07-10

## 2021-07-21 ENCOUNTER — LAB (OUTPATIENT)
Dept: LAB | Facility: CLINIC | Age: 71
End: 2021-07-21
Payer: COMMERCIAL

## 2021-07-21 DIAGNOSIS — I10 ESSENTIAL HYPERTENSION: ICD-10-CM

## 2021-07-21 DIAGNOSIS — E11.9 TYPE 2 DIABETES MELLITUS TREATED WITHOUT INSULIN (H): ICD-10-CM

## 2021-07-21 DIAGNOSIS — E78.5 HYPERLIPIDEMIA LDL GOAL <70: ICD-10-CM

## 2021-07-21 LAB
ANION GAP SERPL CALCULATED.3IONS-SCNC: 5 MMOL/L (ref 3–14)
BUN SERPL-MCNC: 20 MG/DL (ref 7–30)
CALCIUM SERPL-MCNC: 9.8 MG/DL (ref 8.5–10.1)
CHLORIDE BLD-SCNC: 105 MMOL/L (ref 94–109)
CO2 SERPL-SCNC: 26 MMOL/L (ref 20–32)
CREAT SERPL-MCNC: 0.97 MG/DL (ref 0.66–1.25)
CREAT UR-MCNC: 118 MG/DL
GFR SERPL CREATININE-BSD FRML MDRD: 79 ML/MIN/1.73M2
GLUCOSE BLD-MCNC: 192 MG/DL (ref 70–99)
HBA1C MFR BLD: 7.8 % (ref 0–5.6)
MICROALBUMIN UR-MCNC: <5 MG/DL
MICROALBUMIN/CREAT UR: NORMAL MG/G{CREAT}
POTASSIUM BLD-SCNC: 4.8 MMOL/L (ref 3.4–5.3)
SODIUM SERPL-SCNC: 136 MMOL/L (ref 133–144)
T3FREE SERPL-MCNC: 2.5 PG/ML (ref 2.3–4.2)
T4 FREE SERPL-MCNC: 0.78 NG/DL (ref 0.76–1.46)
TSH SERPL DL<=0.005 MIU/L-ACNC: 1.59 MU/L (ref 0.4–4)

## 2021-07-21 PROCEDURE — 84439 ASSAY OF FREE THYROXINE: CPT

## 2021-07-21 PROCEDURE — 84481 FREE ASSAY (FT-3): CPT

## 2021-07-21 PROCEDURE — 84443 ASSAY THYROID STIM HORMONE: CPT

## 2021-07-21 PROCEDURE — 83704 LIPOPROTEIN BLD QUAN PART: CPT | Mod: 90

## 2021-07-21 PROCEDURE — 82043 UR ALBUMIN QUANTITATIVE: CPT

## 2021-07-21 PROCEDURE — 80048 BASIC METABOLIC PNL TOTAL CA: CPT

## 2021-07-21 PROCEDURE — 99000 SPECIMEN HANDLING OFFICE-LAB: CPT

## 2021-07-21 PROCEDURE — 36415 COLL VENOUS BLD VENIPUNCTURE: CPT

## 2021-07-21 PROCEDURE — 83036 HEMOGLOBIN GLYCOSYLATED A1C: CPT

## 2021-07-24 LAB
CHOLEST SERPL-MCNC: 137 MG/DL
HDL SERPL QN: 8.3 NM
HDL SERPL-SCNC: 25.5 UMOL/L
HDLC SERPL-MCNC: 31 MG/DL
HLD.LARGE SERPL-SCNC: <2.8 UMOL/L
LDL SERPL QN: 20.3 NM
LDL SERPL-SCNC: 1296 NMOL/L
LDL SMALL SERPL-SCNC: 856 NMOL/L
LDLC SERPL CALC-MCNC: 68 MG/DL
PATHOLOGY STUDY: ABNORMAL
TRIGL SERPL-MCNC: 189 MG/DL
VLDL LARGE SERPL-SCNC: 3.6 NMOL/L
VLDL SERPL QN: 48.8 NM

## 2021-08-10 ENCOUNTER — OFFICE VISIT (OUTPATIENT)
Dept: SURGERY | Facility: CLINIC | Age: 71
End: 2021-08-10
Attending: INTERNAL MEDICINE
Payer: COMMERCIAL

## 2021-08-10 VITALS
WEIGHT: 221 LBS | SYSTOLIC BLOOD PRESSURE: 128 MMHG | BODY MASS INDEX: 30.94 KG/M2 | DIASTOLIC BLOOD PRESSURE: 86 MMHG | RESPIRATION RATE: 16 BRPM | HEART RATE: 75 BPM | HEIGHT: 71 IN | OXYGEN SATURATION: 96 %

## 2021-08-10 DIAGNOSIS — I10 ESSENTIAL HYPERTENSION WITH GOAL BLOOD PRESSURE LESS THAN 130/80: ICD-10-CM

## 2021-08-10 DIAGNOSIS — F51.11 PRIMARY HYPERSOMNIA: ICD-10-CM

## 2021-08-10 DIAGNOSIS — R33.9 URINARY RETENTION: ICD-10-CM

## 2021-08-10 DIAGNOSIS — T14.90XA ORTHOPEDIC TRAUMA: ICD-10-CM

## 2021-08-10 DIAGNOSIS — F32.5 MAJOR DEPRESSIVE DISORDER, SINGLE EPISODE IN FULL REMISSION (H): ICD-10-CM

## 2021-08-10 DIAGNOSIS — E78.5 HYPERLIPIDEMIA LDL GOAL <70: ICD-10-CM

## 2021-08-10 DIAGNOSIS — E11.00 TYPE 2 DIABETES MELLITUS WITH HYPEROSMOLARITY WITHOUT COMA, WITHOUT LONG-TERM CURRENT USE OF INSULIN (H): ICD-10-CM

## 2021-08-10 DIAGNOSIS — Z00.00 MEDICARE ANNUAL WELLNESS VISIT, SUBSEQUENT: Primary | ICD-10-CM

## 2021-08-10 PROCEDURE — G0439 PPPS, SUBSEQ VISIT: HCPCS | Performed by: INTERNAL MEDICINE

## 2021-08-10 PROCEDURE — 99213 OFFICE O/P EST LOW 20 MIN: CPT | Mod: 25 | Performed by: INTERNAL MEDICINE

## 2021-08-10 RX ORDER — LISINOPRIL 20 MG/1
20 TABLET ORAL DAILY
Qty: 90 TABLET | Refills: 3 | Status: SHIPPED | OUTPATIENT
Start: 2021-08-10 | End: 2022-05-03

## 2021-08-10 RX ORDER — CITALOPRAM HYDROBROMIDE 20 MG/1
20 TABLET ORAL DAILY
Qty: 90 TABLET | Refills: 3 | Status: SHIPPED | OUTPATIENT
Start: 2021-08-10 | End: 2021-11-10

## 2021-08-10 RX ORDER — TAMSULOSIN HYDROCHLORIDE 0.4 MG/1
0.4 CAPSULE ORAL DAILY
Qty: 90 CAPSULE | Refills: 3 | Status: SHIPPED | OUTPATIENT
Start: 2021-08-10 | End: 2022-05-03

## 2021-08-10 RX ORDER — GLIMEPIRIDE 2 MG/1
2 TABLET ORAL 2 TIMES DAILY
Qty: 180 TABLET | Refills: 3 | Status: SHIPPED | OUTPATIENT
Start: 2021-08-10 | End: 2022-05-03

## 2021-08-10 RX ORDER — ROSUVASTATIN CALCIUM 40 MG/1
40 TABLET, COATED ORAL DAILY
Qty: 90 TABLET | Refills: 3 | Status: SHIPPED | OUTPATIENT
Start: 2021-08-10 | End: 2022-05-03

## 2021-08-10 RX ORDER — ZOLPIDEM TARTRATE 10 MG/1
TABLET ORAL
Qty: 10 TABLET | Refills: 5 | Status: SHIPPED | OUTPATIENT
Start: 2021-08-10 | End: 2022-03-25

## 2021-08-10 RX ORDER — ATORVASTATIN CALCIUM 40 MG/1
40 TABLET, FILM COATED ORAL DAILY
Qty: 90 TABLET | Refills: 3 | Status: CANCELLED | OUTPATIENT
Start: 2021-08-10

## 2021-08-10 RX ORDER — FINASTERIDE 5 MG/1
5 TABLET, FILM COATED ORAL DAILY
Qty: 90 TABLET | Refills: 3 | Status: SHIPPED | OUTPATIENT
Start: 2021-08-10 | End: 2022-05-03

## 2021-08-10 ASSESSMENT — MIFFLIN-ST. JEOR: SCORE: 1784.58

## 2021-08-10 NOTE — PROGRESS NOTES
"  Dino Daniels is a 70 year old male who presents for        Medicare annual wellness visit, subsequent  Type 2 diabetes mellitus with hyperosmolarity without coma, without long-term current use of insulin (H)  Hyperlipidemia LDL goal <70  Essential hypertension with goal blood pressure less than 130/80  Major depressive disorder, single episode in full remission (H)  Urinary retention  Primary hypersomnia  Orthopedic trauma         HPI: Dino Daniels is a 70 year old hypertensive hyperlipidemic type 2 diabetic without known CV ds who is presenting for lab f/u and annual medicare physical. He had Amaryl added on at his third to last visit. He had his A1c increase form 7.8 to 8.2 %. I advised adding Jardiance or Victoza. He declined to do so. The plan was therfore to increase dietary and lifestyle behaviors, repeat labs and return for f/u presnetly. He has  been active. His A1C had fallen from 8.2 to 6.9, but has since increased to 7.9 %. His LDL is < 70, but his LDL-P is increasing. He used to be on Crestor , but changed to LIpitor due to cost.. He feels well, but notes he heard a \"pop\" lifting an object last year. Pain has subsided, RUE is irregualrly contoured.       Review Of Systems  Skin: negative  Eyes: negative  Ears/Nose/Throat: negative  Respiratory: No shortness of breath, dyspnea on exertion, cough, or hemoptysis  Cardiovascular: negative  Gastrointestinal: negative  Genitourinary: negative  Musculoskeletal:as above  Neurologic: negative  Psychiatric: negative  Hematologic/Lymphatic/Immunologic: negative  Endocrine: negative  .    Current providers caring for this patient include:  Patient Care Team:  Abel Fernandez MD as PCP - General  Abel Fernandez MD as Assigned Heart and Vascular Provider    Complete Medical and Social history reviewed with patient, outlined below.    Patient Active Problem List   Diagnosis     Pain in joint, ankle and foot     HYPERLIPIDEMIA LDL GOAL " <100     Type 2 diabetes, HbA1c goal < 7% (H)     Major depressive disorder, single episode in full remission (H)       Past Medical History:   Diagnosis Date     Arthritis     knees, left ankle and right thumb     Bladder stone      DEPRESSIVE DISORDER NEC 10/9/2006    resolved in      Hypertension      Mumps      Other and unspecified hyperlipidemia     abstracted 02.     Type II or unspecified type diabetes mellitus without mention of complication, not stated as uncontrolled        Past Surgical History:   Procedure Laterality Date     LASER HOLMIUM LITHOTRIPSY BLADDER  2012    Procedure: LASER HOLMIUM LITHOTRIPSY BLADDER;  Cystoscopy with Removal of Bladder Stone with Holmium Laser;  Surgeon: Chaz Cobb MD;  Location: [facility] OR     TESTICLE SURGERY       VASECTOMY       VASECTOMY       wisdom teeth[         Family History   Problem Relation Age of Onset      () Father          age 56 leukemia     Cerebrovascular Disease Mother       () Brother         b,      () Sister         b, back problems      () Brother         b, back problems       Social History     Tobacco Use     Smoking status: Never Smoker     Smokeless tobacco: Never Used   Substance Use Topics     Alcohol use: Yes     Alcohol/week: 0.0 standard drinks     Comment: rarely       Diet: regular, low salt/low fat  Physical Activity: active without specific exercise program  Depression Screen:    Over the past 2 weeks, patient has felt down, depressed, or hopeless:  No    Over the past 2 weeks, patient has felt little interest or pleasure in doing things: No    Functional ability/Safety screen:  Up and go test (able to get up and walk longer than 30 seconds): Passed  Patient needs assistance with: nothing  Patient's home has the following possible safety concerns: none identified  Patient has concerns about his hearing:  No  Cognitive Screen  Patient repeats three objects (ball, flag, tree)      Clock drawing  "test:   NORMAL  Recalls three objects after 3 minutes (ball,flag,tree):                                                                                               recalls 3 objects (3 points)    Physical Exam:  /86   Pulse 75   Resp 16   Ht 1.803 m (5' 11\")   Wt 100.2 kg (221 lb)   SpO2 96%   BMI 30.82 kg/m     Body mass index is 30.82 kg/m .          GENERAL APPEARANCE: healthy, alert and no distress  PSYCH: Affect normal/bright.  Mentation within normal limits.  EYES: conjunctiva clear  HENT: ear canals and TM's normal.  Nose and mouth without ulcers, erythema or lesions  NECK: supple, nontender, no lymphadenopathy  RESP: lungs clear to auscultation - no rales, rhonchi or wheezes  CV: regular rates and rhythm, normal S1 S2, no murmur noted  ABDOMEN:  soft, nontender, no HSM or masses and bowel sounds normal  SKIN: no suspicious lesions or rashes  NEURO: Normal strength and tone,  normal speech and mentation  Extremities: no peripheral edema or tenderness, peripheral pulses normal; RUE irregualrly contoured  MS: extremities normal- no gross deformities noted, no erythema, FROM noted in all extremities  PSYCH: mentation appears normal  LYMPHATICS: no cervical adenopathy    End of Life Planning:   Patient currently has an advanced directive: Yes.  Practitioner is supportive of decision.    Education/Counseling:   Based on review of the above information, the following items were addressed:      Diabetes -  access to diabetes self-management training, medical nutrition therapy, and treatment    Appropriate preventive services were discussed with this patient, including applicable screening as appropriate for cardiovascular disease, diabetes, osteopenia/osteoporosis, and glaucoma.  As appropriate for age/gender, discussed screening for colorectal cancer, prostate cancer, breast cancer, and cervical cancer.   Checklist reviewing preventive services available has been given to the patient.           "         Component      Latest Ref Rng & Units 12/8/2020 7/21/2021   WBC      4.0 - 11.0 10e9/L 5.4    RBC Count      4.4 - 5.9 10e12/L 4.96    Hemoglobin      13.3 - 17.7 g/dL 15.0    Hematocrit      40.0 - 53.0 % 43.3    MCV      78 - 100 fl 87    MCH      26.5 - 33.0 pg 30.2    MCHC      31.5 - 36.5 g/dL 34.6    RDW      10.0 - 15.0 % 13.6    Platelet Count      150 - 450 10e9/L 167    % Neutrophils      % 50.0    % Lymphocytes      % 39.3    % Monocytes      % 7.5    % Eosinophils      % 2.6    % Basophils      % 0.6    Absolute Neutrophil      1.6 - 8.3 10e9/L 2.7    Absolute Lymphocytes      0.8 - 5.3 10e9/L 2.1    Absolute Monocytes      0.0 - 1.3 10e9/L 0.4    Absolute Eosinophils      0.0 - 0.7 10e9/L 0.1    Absolute Basophils      0.0 - 0.2 10e9/L 0.0    Diff Method       Automated Method    Total Cholesterol      <=199 mg/dL 117 137   Triglycerides      30 - 149 mg/dL 131 189 (H)   HDL Cholesterol      40 - 59 mg/dL 29 (L) 31 (L)   LDL Cholesterol      <=129 mg/dL 62 68   HDL Size      >=8.9 nm 8.1 (L) 8.3 (L)   VLDL Size      <=46.7 nm 49.7 (H) 48.8 (H)   LDL Particle Size      >=20.7 nm 20.5 (L) 20.3 (L)   Lge HDL Particle number      >=4.2 umol/L <2.8 (L) <2.8 (L)   HDL Particle Number NMR      >=33.0 umol/L 24.4 (L) 25.5 (L)   Lge VLDL Part number      <=2.7 nmol/L 1.8 3.6 (H)   Small LDL Particle number      <=634 nmol/L 858 (H) 856 (H)   LDL Particle Number      <=1135 nmol/L 1,066 1296 (H)   EER LipoFit by NMR       SEE NOTE See Note   Sodium      133 - 144 mmol/L 136 136   Potassium      3.4 - 5.3 mmol/L 4.3 4.8   Chloride      94 - 109 mmol/L 108 105   Carbon Dioxide      20 - 32 mmol/L 22 26   Anion Gap      3 - 14 mmol/L 6 5   Glucose      70 - 99 mg/dL 172 (H) 192 (H)   Urea Nitrogen      7 - 30 mg/dL 26 20   Creatinine      0.66 - 1.25 mg/dL 0.93 0.97   GFR Estimate      >60 mL/min/1.73m2 83 79   GFR Estimate If Black      >60 mL/min/1.73:m2 >90    Calcium      8.5 - 10.1 mg/dL 9.1 9.8    Bilirubin Direct      0.0 - 0.2 mg/dL 0.1    Bilirubin Total      0.2 - 1.3 mg/dL 0.5    Albumin      3.4 - 5.0 g/dL 4.2    Protein Total      6.8 - 8.8 g/dL 7.9    Alkaline Phosphatase      40 - 150 U/L 41    ALT      0 - 70 U/L 41    AST      0 - 45 U/L 18    Creatinine Urine      mg/dL 101 118   Albumin Urine mg/L      mg/dL 5 <5   Albumin Urine mg/g Cr       5.12    Lipoprotein (a)      <=29 mg/dL 11    CRP Cardiac Risk      mg/L 0.5    Hemoglobin A1C      0.0 - 5.6 % 6.9 (H) 7.8 (H)   TSH      0.40 - 4.00 mU/L 2.22 1.59   T4 Free      0.76 - 1.46 ng/dL 0.78 0.78   Free T3      2.3 - 4.2 pg/mL 2.4 2.5         A/P:    (Z00.00) Medicare annual wellness visit, subsequent  (primary encounter diagnosis)  Comment: doing well  Plan: RTC one year for annual physical    (E11.00) Type 2 diabetes mellitus with A1C goal < 7 %  Comment: not at goal. Advised Jardiance or Victoza to decrease CV mortality. He will try to lose 30# instead, he will RTC insix months for labs, if not at goal at t itme will go on either med at that time  Plan: glimepiride (AMARYL) 2 MG tablet, metFORMIN         (GLUCOPHAGE) 1000 MG tablet, OFFICE/OUTPT         VISIT,EST,LEVL IV, CANCELED: OFFICE/OUTPT         VISIT,EST,LEVL III           (E78.5) Hyperlipidemia LDL goal <70  Comment: at goal but worse, stop Lipitor, start Crestor  Plan: rosuvastatin (CRESTOR) 40 MG tablet,         OFFICE/OUTPT VISIT,EST,LEVL IV, CANCELED:         OFFICE/OUTPT VISIT,EST,LEVL III           (I10) Essential hypertension with goal blood pressure less than 130/80  Comment: at goal  Plan: lisinopril (ZESTRIL) 20 MG tablet, OFFICE/OUTPT        VISIT,EST,LEVL IV, CANCELED: OFFICE/OUTPT         VISIT,EST,LEVL III           (F32.5) Major depressive disorder, single episode in full remission (H)  Comment: scale back on Celexa as depression well contorlled  Plan: citalopram (CELEXA) 20 MG tablet, OFFICE/OUTPT         VISIT,EST,LEVL IV, CANCELED: OFFICE/OUTPT          VISIT,EST,LEVL III           (R33.9) Urinary retention  Comment: needs the below  Plan: finasteride (PROSCAR) 5 MG tablet, tamsulosin         (FLOMAX) 0.4 MG capsule, OFFICE/OUTPT         VISIT,EST,LEVL IV, CANCELED: OFFICE/OUTPT         VISIT,EST,LEVL III           (F51.11) Primary hypersomnia  Comment: needs the below  Plan: zolpidem (AMBIEN) 10 MG tablet, OFFICE/OUTPT         VISIT,EST,LEVL IV           (T14.90XA) Orthopedic trauma  Comment: check the below, refer to ortho for operative repair based upon anatomy  Plan: MR Humerus Upper Arm Right w Contrast,         OFFICE/OUTPT VISIT,EST,LEVL IV, CANCELED:         OFFICE/OUTPT VISIT,EST,LEVL III         25 minutes not spent on preventive care during this visit was spent providing evaluation and management of problems #2 onward  as delineated above.

## 2021-08-11 ENCOUNTER — TELEPHONE (OUTPATIENT)
Dept: OTHER | Facility: CLINIC | Age: 71
End: 2021-08-11

## 2021-08-11 NOTE — TELEPHONE ENCOUNTER
8/11/2021     Called patient and warm transferred to central imaging [phone].     Patient scheduled     9/9/2021 MRI      Maria Teresa MONTANEZ

## 2021-08-11 NOTE — TELEPHONE ENCOUNTER
Follow up to:  8/10/21    7/21/21 Creat 0.97; GFR 79; No contrast allergy per chart review.    Please arrange for:    MR Humerus Upper Arm Right w Contrast    Results will be communicated via [employer] message or phone call    Lorna Huffman RN BSN  [facility]  [phone]

## 2021-09-04 ENCOUNTER — HEALTH MAINTENANCE LETTER (OUTPATIENT)
Age: 71
End: 2021-09-04

## 2021-09-07 ENCOUNTER — TELEPHONE (OUTPATIENT)
Dept: OTHER | Facility: CLINIC | Age: 71
End: 2021-09-07

## 2021-09-07 NOTE — TELEPHONE ENCOUNTER
Called, left voice message asking Dino to provide the phone number listed on the denial so the rationale for the MR can be communicated to his insurance company.    Lorna Huffman RN BSN  [facility]  [phone]

## 2021-09-07 NOTE — TELEPHONE ENCOUNTER
Patient stated Goldy ([phone]) was the person who let him know that his insurance had denied the MRNicole Smith, Lorna Lr RN  Denied Procedure / Peer to Peer Request     Patient Name: Dino Daniels   : 1950   MRN: [id number]     Dear Dr Fernandez:     The authorization for procedure  MR HUMERUS UPPER ARM RIGHT W CONTRAST on date of service 2021 has been denied. We were unsuccessful in obtaining approval through clinical review. A [id number] review can be done by calling the insurance/third party authorization vendor with the following information:     Insurance: [employer]vantage   Auth Vendor: [employer]   Phone: [phone]     Patient ID: [id number]   Case/Ref #: [id number]     Patient contact: Yes   Patient response: Left detailed voicemail to contact your office for further instruction   Patient Phone #: [phone]     Denial Reason: Insurance is an Xray of the Upper Extemity  And 6 weeks conservative treatment (IE: Physical Therapy, etc). Once completed, a follow-up visit with physician is required out-lining progress of conservative therapy.     Please complete this as soon as you are able and let me know when it is done.     Thank you,     Dasha Smith   [facility]   [phone]

## 2021-09-07 NOTE — TELEPHONE ENCOUNTER
[facility]    Who is the name of the provider?:  Rebecca      What is the location you see this provider at?: [place] / [place]     Reason for call:  Insurance is denying MRI, telling him he needs to have x-ray and PT.  [facility] is requiring Dr. Fernandez to call them to explain why he should have MRI.       Can we leave a detailed message on this number?  YES

## 2021-09-07 NOTE — TELEPHONE ENCOUNTER
Based upon his physical examination, the patient has likely ruptured his biceps tendon. Xray is inadequate to diagnose this problem. PT will not cure the problem. He likely requires surgical repair. An orthopedic surgeon would need an MRI to ascertain what the exact soft tissue anatomical abnormality is.

## 2021-09-07 NOTE — TELEPHONE ENCOUNTER
Spoke to [facility] representative who requested that I fax additional informtiton to [phone]    Faxed this encounter and original office visit notes to above number.    [employer] 09/07/21 3:43.    Lorna Huffman RN BSN  [facility]  [phone]

## 2021-09-09 ENCOUNTER — TELEPHONE (OUTPATIENT)
Dept: OTHER | Facility: CLINIC | Age: 71
End: 2021-09-09

## 2021-09-09 NOTE — TELEPHONE ENCOUNTER
See encounter from 9/7/21.  I called patient and LVM discussing we have not received approval for MRI and he should cancel appointment until we are notified the MRI is covered.     Nancy BORJAS, RN    [facility]  Office: [phone]  Fax: [phone]

## 2021-09-09 NOTE — TELEPHONE ENCOUNTER
[facility]     Who is the name of the provider?:  Rebecca      What is the location you see this provider at?: [place]    Reason for call:  MRI at 1:00 today.   Insurance has denied coverage, needs to know if Dr. Fernandez has responded to request for medical necessity.  Needs to know if he should keep the appt.   Will be in meetings this am, please leave message.      Can we leave a detailed message on this number?  YES

## 2021-09-18 ENCOUNTER — MYC MEDICAL ADVICE (OUTPATIENT)
Dept: SURGERY | Facility: CLINIC | Age: 71
End: 2021-09-18

## 2021-09-21 NOTE — TELEPHONE ENCOUNTER
Received confirmation from [facility] that MR will be covered.  Sent to [facility] [employer].  Updated patient via [employer] message.    Lorna Huffman RN BSN  [facility]  [phone]

## 2021-09-23 NOTE — TELEPHONE ENCOUNTER
[facility]     Who is the name of the provider? Dr Fernandez     What is the location you see this provider at?  [place]      Reason for call:  Pt called to schedule his MRI and was told that the approval has  and we need to either extend it or file for a new one    :  Dino    Phone number to call: [phone]    Additional Notes:

## 2021-09-27 NOTE — TELEPHONE ENCOUNTER
Order extended.  Patient notified.      Lorna Huffman RN BSN  [facility]  [phone]

## 2021-10-01 ENCOUNTER — TELEPHONE (OUTPATIENT)
Dept: OTHER | Facility: CLINIC | Age: 71
End: 2021-10-01

## 2021-10-01 NOTE — TELEPHONE ENCOUNTER
ANGIE for patient to call us back to inform him that the Covid Test can be ordered at his 11/10/21 after discussed with Dr. Fernandez.

## 2021-10-01 NOTE — TELEPHONE ENCOUNTER
Patient is scheduled for pre-op on 11/10/21 with Dr. Fernandez. This can be ordered at that time after discussed with Dr. Fernandez.    Nancy HEADLEYN, RN    [facility]  Vascular [facility]  Office: [phone]  Fax: [phone]

## 2021-10-01 NOTE — TELEPHONE ENCOUNTER
[facility]     Who is the name of the provider? Dr Fernandez     What is the location you see this provider at?  [place] / [place]     Reason for call:  Pt needs an order for a Covid test prior to his 11/17/21 eye surgery. He will need to have the test done between the 13th and 15th.     :  Dino    Phone number to call:  [phone]    Additional Notes:

## 2021-10-06 ENCOUNTER — HOSPITAL ENCOUNTER (OUTPATIENT)
Dept: MRI IMAGING | Facility: CLINIC | Age: 71
Discharge: HOME | End: 2021-10-06
Attending: INTERNAL MEDICINE | Admitting: INTERNAL MEDICINE
Payer: COMMERCIAL

## 2021-10-06 DIAGNOSIS — T14.90XA ORTHOPEDIC TRAUMA: ICD-10-CM

## 2021-10-06 DIAGNOSIS — S46.219A BICEPS TENDON TEAR: Primary | ICD-10-CM

## 2021-10-06 PROCEDURE — 73221 MRI JOINT UPR EXTREM W/O DYE: CPT | Mod: RT

## 2021-10-06 PROCEDURE — 73221 MRI JOINT UPR EXTREM W/O DYE: CPT | Mod: 26 | Performed by: RADIOLOGY

## 2021-10-06 NOTE — PROGRESS NOTES
Referral sent to [facility] [phone].    [employer] 10/06/21  1:17.    Patient notified.    Lorna Huffman RN BSN  [facility]  [phone]

## 2021-10-07 NOTE — TELEPHONE ENCOUNTER
ANGIE Sun to inform him that he can request Dr. Fernandez to order his Covid test at his 11/10/2021 appt.     Ara TORRES

## 2021-11-01 NOTE — PROGRESS NOTES
NMR,  CMP, CBC with plt and diff, TSH, FT4, FT3, a1c urinary microalbumin ordered per [facility]F.    Six months from 8/10/21.    Lorna Huffman RN BSN  [facility]  [phone]

## 2021-11-10 ENCOUNTER — OFFICE VISIT (OUTPATIENT)
Dept: OTHER | Facility: CLINIC | Age: 71
End: 2021-11-10
Attending: INTERNAL MEDICINE
Payer: COMMERCIAL

## 2021-11-10 VITALS
HEART RATE: 85 BPM | BODY MASS INDEX: 30.24 KG/M2 | WEIGHT: 216 LBS | RESPIRATION RATE: 16 BRPM | DIASTOLIC BLOOD PRESSURE: 88 MMHG | SYSTOLIC BLOOD PRESSURE: 140 MMHG | HEIGHT: 71 IN | OXYGEN SATURATION: 97 %

## 2021-11-10 DIAGNOSIS — Z01.818 PREOP GENERAL PHYSICAL EXAM: Primary | ICD-10-CM

## 2021-11-10 PROCEDURE — 93010 ELECTROCARDIOGRAM REPORT: CPT | Performed by: INTERNAL MEDICINE

## 2021-11-10 PROCEDURE — G0463 HOSPITAL OUTPT CLINIC VISIT: HCPCS

## 2021-11-10 PROCEDURE — 99214 OFFICE O/P EST MOD 30 MIN: CPT | Performed by: INTERNAL MEDICINE

## 2021-11-10 PROCEDURE — 93005 ELECTROCARDIOGRAM TRACING: CPT

## 2021-11-10 ASSESSMENT — MIFFLIN-ST. JEOR: SCORE: 1761.9

## 2021-11-10 NOTE — PROGRESS NOTES
[facility] [place]  [address]  [place] [address]  [phone]  Dept: [phone]    PRE-OP EVALUATION:  Today's date: 11/10/2021    Dino Daniels (: 1950) presents for pre-operative evaluation assessment as requested by Dr. Emmanuel.  He requires evaluation and anesthesia risk assessment prior to undergoing surgery/procedure for treatment of cataract left eye surgery.  Proposed procedure: cataract of the left eye surgery    Date of Surgery/ Procedure: 2021  Time of Surgery/ Procedure: 8:30 am  Hospital/Surgical Facility: [facility] in [place], [place]  Primary Physician: Abel Fernandez  Type of Anesthesia Anticipated: General    Patient has a Health Care Directive or Living Will:  YES       HPI:     HPI related to upcoming procedure: Dino Daniels is a 70 year old diabetic male with bilateral cataracts Lt>>Rt. He notes increasing blurred vision in left eye and is presenting for preoperative exam prior to phacoemulsification w/ IOL implantation.      See problem list for active medical problems.  Problems all longstanding and stable, except as noted/documented.  See ROS for pertinent symptoms related to these conditions.      MEDICAL HISTORY:     Patient Active Problem List    Diagnosis Date Noted     Major depressive disorder, single episode in full remission (H) 2015     Priority: Medium     Type 2 diabetes, HbA1c goal < 7% (H) 2012     Priority: Medium     HYPERLIPIDEMIA LDL GOAL <100 10/31/2010     Priority: Medium     Pain in joint, ankle and foot 10/26/2007     Priority: Medium      Past Medical History:   Diagnosis Date     Arthritis     knees, left ankle and right thumb     Bladder stone      DEPRESSIVE DISORDER NEC 10/9/2006    resolved in      Hypertension      Mumps      Other and unspecified hyperlipidemia     abstracted 02.     Type II or unspecified type diabetes mellitus without mention of complication,  not stated as uncontrolled      Past Surgical History:   Procedure Laterality Date     LASER HOLMIUM LITHOTRIPSY BLADDER  6/22/2012    Procedure: LASER HOLMIUM LITHOTRIPSY BLADDER;  Cystoscopy with Removal of Bladder Stone with Holmium Laser;  Surgeon: Chaz Cobb MD;  Location: [facility] OR     TESTICLE SURGERY       VASECTOMY       VASECTOMY       wisdom teeth[       Current Outpatient Medications   Medication Sig Dispense Refill     aspirin (ASA) 81 MG EC tablet Take 1 tablet (81 mg) by mouth daily       atorvastatin (LIPITOR) 40 MG tablet Take 1 tablet (40 mg) by mouth daily 90 tablet 3     blood glucose (CONTOUR NEXT TEST) test strip 1 strip by In Vitro route 4 times daily 400 strip 3     blood glucose calibration (CONTOUR NEXT CONTROL LOW VI SOLN) Low solution Use to calibrate blood glucose monitor as directed. 1 Bottle 1     blood glucose calibration (CONTOUR NEXT CONTROL NORMAL VI SOLN) Normal solution Use to calibrate blood glucose monitor as needed as directed. 1 Bottle 1     blood glucose monitoring (CONTOUR NEXT MONITOR W/DEVICE KIT) meter device kit Use to test blood sugar 4 times daily or as directed. 1 kit 1     finasteride (PROSCAR) 5 MG tablet Take 1 tablet (5 mg) by mouth daily 90 tablet 3     glimepiride (AMARYL) 2 MG tablet Take 1 tablet (2 mg) by mouth 2 times daily 180 tablet 3     KRILL OIL OR None Entered       lisinopril (ZESTRIL) 20 MG tablet Take 1 tablet (20 mg) by mouth daily 90 tablet 3     metFORMIN (GLUCOPHAGE) 1000 MG tablet Take 1 tablet (1,000 mg) by mouth 2 times daily (with meals) 180 tablet 3     Microlet Lancets MISC Use to test blood sugar 4 times daily. 300 each 3     MULTIPLE VITAMINS CAPS   OR 1 capsule qd        ONETOUCH DELICA LANCETS 33G MISC TEST 4 TIMES DAILY 300 each 1     rosuvastatin (CRESTOR) 40 MG tablet Take 1 tablet (40 mg) by mouth daily 90 tablet 3     tamsulosin (FLOMAX) 0.4 MG capsule Take 1 capsule (0.4 mg) by mouth daily 90 capsule 3     zolpidem  "(AMBIEN) 10 MG tablet TAKE ONE TABLET BY MOUTH AT BEDTIME AS NEEDED FOR SLEEP 10 tablet 5     OTC products: Aspirin    Allergies   Allergen Reactions     No Known Drug Allergies       Latex Allergy: NO    Social History     Tobacco Use     Smoking status: Never Smoker     Smokeless tobacco: Never Used   Substance Use Topics     Alcohol use: Yes     Alcohol/week: 0.0 standard drinks     Comment: rarely     History   Drug Use No       REVIEW OF SYSTEMS:   CONSTITUTIONAL: NEGATIVE for fever, chills, change in weight  INTEGUMENTARY/SKIN: NEGATIVE for worrisome rashes, moles or lesions  EYES: POSITIVE for blurred vision left  ENT/MOUTH: NEGATIVE for ear, mouth and throat problems  RESP: NEGATIVE for significant cough or SOB  CV: NEGATIVE for chest pain, palpitations or peripheral edema  GI: NEGATIVE for nausea, abdominal pain, heartburn, or change in bowel habits  : NEGATIVE for frequency, dysuria, or hematuria  MUSCULOSKELETAL: NEGATIVE for significant arthralgias or myalgia  NEURO: NEGATIVE for weakness, dizziness or paresthesias  ENDOCRINE: NEGATIVE for temperature intolerance, skin/hair changes  HEME: NEGATIVE for bleeding problems  PSYCHIATRIC: NEGATIVE for changes in mood or affect    EXAM:   BP (!) 140/88 (BP Location: Left arm, Patient Position: Chair, Cuff Size: Adult Regular)   Pulse 85   Resp 16   Ht 5' 11\" (1.803 m)   Wt 216 lb (98 kg)   SpO2 97%   BMI 30.13 kg/m      GENERAL APPEARANCE: healthy, alert and no distress     EYES: bilateral cataracts     HENT: ear canals and TM's normal and nose and mouth without ulcers or lesions     NECK: no adenopathy, no asymmetry, masses, or scars and thyroid normal to palpation     RESP: lungs clear to auscultation - no rales, rhonchi or wheezes     CV: regular rates and rhythm, normal S1 S2, no S3 or S4 and no murmur, click or rub     ABDOMEN:  soft, nontender, no HSM or masses and bowel sounds normal     MS: extremities normal- no gross deformities noted, no " evidence of inflammation in joints, FROM in all extremities.     SKIN: no suspicious lesions or rashes     NEURO: Normal strength and tone, sensory exam grossly normal, mentation intact and speech normal     PSYCH: mentation appears normal. and affect normal/bright     LYMPHATICS: No cervical adenopathy    DIAGNOSTICS:   EKG: appears normal, NSR, normal axis, normal intervals, no acute ST/T changes c/w ischemia, no LVH by voltage criteria    Recent Labs   Lab Test 07/21/21  0815 12/08/20  0712 07/29/20  0831 03/05/19  0901 02/07/19  1210 12/07/17  0755 02/06/17  1100   HGB  --  15.0 14.6   < > 14.0   < > 14.8   PLT  --  167 172   < >  --    < >  --    INR  --   --   --   --  0.97  --  1.00    136 135   < >  --    < >  --    POTASSIUM 4.8 4.3 4.5   < >  --    < >  --    CR 0.97 0.93 0.89   < >  --    < >  --    A1C 7.8* 6.9* 8.2*   < > 8.2*   < >  --     < > = values in this interval not displayed.        IMPRESSION:   Reason for surgery/procedure: cataracts  Diagnosis/reason for consult: diabetes    The proposed surgical procedure is considered LOW risk.    REVISED CARDIAC RISK INDEX  The patient has the following serious cardiovascular risks for perioperative complications such as (MI, PE, VFib and 3  AV Block):  No serious cardiac risks  INTERPRETATION: 0 risks: Class I (very low risk - 0.4% complication rate)    The patient has the following additional risks for perioperative complications:  No identified additional risks      ICD-10-CM    1. Preop general physical exam  Z01.818 EKG 12-lead complete w/read - Clinics       RECOMMENDATIONS:     --Consult hospital rounder / IM to assist post-op medical management    --Patient is to take all scheduled medications on the day of surgery EXCEPT for modifications listed below.   -Hold oral diabetic agents AM of surgery.      APPROVAL GIVEN to proceed with proposed procedure, without further diagnostic evaluation       Signed Electronically by: Abel Mills  MD Rebecca    Copy of this evaluation report is provided to requesting physician.    [facility] Preop Guidelines

## 2021-11-10 NOTE — PROGRESS NOTES
Face sheet, office visit notes and EKG faxed to Dr. Emmanuel @ [facility] [phone]    Rightfax 11/10/21 1:28.  Lorna Huffman RN BSN  [facility]  [phone]

## 2021-11-10 NOTE — PROGRESS NOTES
"[facility]        Patient is here for a follow up  to discuss about pre-op eye surgery    BP (!) 140/88 (BP Location: Left arm, Patient Position: Chair, Cuff Size: Adult Regular)   Pulse 85   Resp 16   Ht 5' 11\" (1.803 m)   Wt 216 lb (98 kg)   SpO2 97%   BMI 30.13 kg/m      The provider has been notified that the patient has no concerns.     Questions patient would like addressed today are: N/A.    Refills are needed: No    Has homecare services and agency name:  [facility]      "

## 2021-11-12 DIAGNOSIS — Z20.822 SUSPECTED 2019 NOVEL CORONAVIRUS INFECTION: Primary | ICD-10-CM

## 2021-11-15 ENCOUNTER — MYC MEDICAL ADVICE (OUTPATIENT)
Dept: OTHER | Facility: CLINIC | Age: 71
End: 2021-11-15

## 2021-11-15 ENCOUNTER — LAB (OUTPATIENT)
Dept: URGENT CARE | Facility: URGENT CARE | Age: 71
End: 2021-11-15
Attending: INTERNAL MEDICINE
Payer: COMMERCIAL

## 2021-11-15 DIAGNOSIS — Z20.822 SUSPECTED 2019 NOVEL CORONAVIRUS INFECTION: ICD-10-CM

## 2021-11-15 LAB — SARS-COV-2 RNA RESP QL NAA+PROBE: NEGATIVE

## 2021-11-15 PROCEDURE — U0005 INFEC AGEN DETEC AMPLI PROBE: HCPCS

## 2021-11-15 PROCEDURE — U0003 INFECTIOUS AGENT DETECTION BY NUCLEIC ACID (DNA OR RNA); SEVERE ACUTE RESPIRATORY SYNDROME CORONAVIRUS 2 (SARS-COV-2) (CORONAVIRUS DISEASE [COVID-19]), AMPLIFIED PROBE TECHNIQUE, MAKING USE OF HIGH THROUGHPUT TECHNOLOGIES AS DESCRIBED BY CMS-2020-01-R: HCPCS

## 2021-11-26 ENCOUNTER — MYC MEDICAL ADVICE (OUTPATIENT)
Dept: OTHER | Facility: CLINIC | Age: 71
End: 2021-11-26
Payer: COMMERCIAL

## 2021-11-26 NOTE — TELEPHONE ENCOUNTER
Please see below and advise      Nancy HEADLEYN, RN    [facility]  Office: [phone]  Fax: [phone]

## 2021-11-29 NOTE — TELEPHONE ENCOUNTER
I will sign a form for the patient. Please leave one on my desk. Please advise him to contact ortho about ongoing pain.

## 2022-01-13 ENCOUNTER — TELEPHONE (OUTPATIENT)
Dept: OTHER | Facility: CLINIC | Age: 72
End: 2022-01-13
Payer: COMMERCIAL

## 2022-01-13 NOTE — TELEPHONE ENCOUNTER
Pt received a letter to schedule. He has been scheduled for labs and then to see Dr. Fernandez in [place] 3/1/22.

## 2022-02-16 ENCOUNTER — LAB (OUTPATIENT)
Dept: LAB | Facility: CLINIC | Age: 72
End: 2022-02-16
Payer: COMMERCIAL

## 2022-02-16 DIAGNOSIS — E78.5 HYPERLIPIDEMIA LDL GOAL <70: ICD-10-CM

## 2022-02-16 DIAGNOSIS — E11.00 TYPE 2 DIABETES MELLITUS WITH HYPEROSMOLARITY WITHOUT COMA, WITHOUT LONG-TERM CURRENT USE OF INSULIN (H): ICD-10-CM

## 2022-02-16 DIAGNOSIS — I10 ESSENTIAL HYPERTENSION WITH GOAL BLOOD PRESSURE LESS THAN 130/80: ICD-10-CM

## 2022-02-16 LAB
ALBUMIN SERPL-MCNC: 3.8 G/DL (ref 3.4–5)
ALP SERPL-CCNC: 32 U/L (ref 40–150)
ALT SERPL W P-5'-P-CCNC: 37 U/L (ref 0–70)
ANION GAP SERPL CALCULATED.3IONS-SCNC: 8 MMOL/L (ref 3–14)
AST SERPL W P-5'-P-CCNC: 17 U/L (ref 0–45)
BASOPHILS # BLD AUTO: 0 10E3/UL (ref 0–0.2)
BASOPHILS NFR BLD AUTO: 1 %
BILIRUB SERPL-MCNC: 0.5 MG/DL (ref 0.2–1.3)
BUN SERPL-MCNC: 19 MG/DL (ref 7–30)
CALCIUM SERPL-MCNC: 9.1 MG/DL (ref 8.5–10.1)
CHLORIDE BLD-SCNC: 108 MMOL/L (ref 94–109)
CO2 SERPL-SCNC: 23 MMOL/L (ref 20–32)
CREAT SERPL-MCNC: 0.94 MG/DL (ref 0.66–1.25)
CREAT UR-MCNC: 166 MG/DL
EOSINOPHIL # BLD AUTO: 0.2 10E3/UL (ref 0–0.7)
EOSINOPHIL NFR BLD AUTO: 3 %
ERYTHROCYTE [DISTWIDTH] IN BLOOD BY AUTOMATED COUNT: 14 % (ref 10–15)
GFR SERPL CREATININE-BSD FRML MDRD: 87 ML/MIN/1.73M2
GLUCOSE BLD-MCNC: 205 MG/DL (ref 70–99)
HBA1C MFR BLD: 8.3 % (ref 0–5.6)
HCT VFR BLD AUTO: 42.1 % (ref 40–53)
HGB BLD-MCNC: 14.1 G/DL (ref 13.3–17.7)
LYMPHOCYTES # BLD AUTO: 2.1 10E3/UL (ref 0.8–5.3)
LYMPHOCYTES NFR BLD AUTO: 35 %
MCH RBC QN AUTO: 29.7 PG (ref 26.5–33)
MCHC RBC AUTO-ENTMCNC: 33.5 G/DL (ref 31.5–36.5)
MCV RBC AUTO: 89 FL (ref 78–100)
MICROALBUMIN UR-MCNC: 10 MG/L
MICROALBUMIN/CREAT UR: 6.02 MG/G CR (ref 0–17)
MONOCYTES # BLD AUTO: 0.5 10E3/UL (ref 0–1.3)
MONOCYTES NFR BLD AUTO: 8 %
NEUTROPHILS # BLD AUTO: 3.2 10E3/UL (ref 1.6–8.3)
NEUTROPHILS NFR BLD AUTO: 53 %
PLATELET # BLD AUTO: 158 10E3/UL (ref 150–450)
POTASSIUM BLD-SCNC: 4.3 MMOL/L (ref 3.4–5.3)
PROT SERPL-MCNC: 7.2 G/DL (ref 6.8–8.8)
RBC # BLD AUTO: 4.74 10E6/UL (ref 4.4–5.9)
SODIUM SERPL-SCNC: 139 MMOL/L (ref 133–144)
T3FREE SERPL-MCNC: 2.7 PG/ML (ref 2.3–4.2)
T4 FREE SERPL-MCNC: 0.86 NG/DL (ref 0.76–1.46)
TSH SERPL DL<=0.005 MIU/L-ACNC: 2.12 MU/L (ref 0.4–4)
WBC # BLD AUTO: 6.1 10E3/UL (ref 4–11)

## 2022-02-16 PROCEDURE — 84443 ASSAY THYROID STIM HORMONE: CPT

## 2022-02-16 PROCEDURE — 83704 LIPOPROTEIN BLD QUAN PART: CPT | Mod: 90

## 2022-02-16 PROCEDURE — 36415 COLL VENOUS BLD VENIPUNCTURE: CPT

## 2022-02-16 PROCEDURE — 83036 HEMOGLOBIN GLYCOSYLATED A1C: CPT

## 2022-02-16 PROCEDURE — 84439 ASSAY OF FREE THYROXINE: CPT

## 2022-02-16 PROCEDURE — 80053 COMPREHEN METABOLIC PANEL: CPT

## 2022-02-16 PROCEDURE — 99000 SPECIMEN HANDLING OFFICE-LAB: CPT

## 2022-02-16 PROCEDURE — 85025 COMPLETE CBC W/AUTO DIFF WBC: CPT

## 2022-02-16 PROCEDURE — 82043 UR ALBUMIN QUANTITATIVE: CPT

## 2022-02-16 PROCEDURE — 84481 FREE ASSAY (FT-3): CPT

## 2022-03-16 ENCOUNTER — OFFICE VISIT (OUTPATIENT)
Dept: OTHER | Facility: CLINIC | Age: 72
End: 2022-03-16
Attending: INTERNAL MEDICINE
Payer: COMMERCIAL

## 2022-03-16 VITALS
SYSTOLIC BLOOD PRESSURE: 128 MMHG | BODY MASS INDEX: 30.32 KG/M2 | HEART RATE: 93 BPM | DIASTOLIC BLOOD PRESSURE: 78 MMHG | OXYGEN SATURATION: 94 % | WEIGHT: 216.6 LBS | HEIGHT: 71 IN

## 2022-03-16 DIAGNOSIS — E78.5 HYPERLIPIDEMIA LDL GOAL <70: ICD-10-CM

## 2022-03-16 DIAGNOSIS — E11.00 TYPE 2 DIABETES MELLITUS WITH HYPEROSMOLARITY WITHOUT COMA, WITHOUT LONG-TERM CURRENT USE OF INSULIN (H): ICD-10-CM

## 2022-03-16 DIAGNOSIS — I10 ESSENTIAL HYPERTENSION WITH GOAL BLOOD PRESSURE LESS THAN 130/80: ICD-10-CM

## 2022-03-16 PROCEDURE — G0463 HOSPITAL OUTPT CLINIC VISIT: HCPCS

## 2022-03-16 PROCEDURE — 99214 OFFICE O/P EST MOD 30 MIN: CPT | Performed by: INTERNAL MEDICINE

## 2022-03-16 RX ORDER — LIRAGLUTIDE 6 MG/ML
INJECTION SUBCUTANEOUS
Qty: 9 ML | Refills: 11 | Status: SHIPPED | OUTPATIENT
Start: 2022-03-16 | End: 2022-05-03

## 2022-03-16 NOTE — PROGRESS NOTES
"Patient is here to discuss 6 month follow up from 8/2021.     /84 (BP Location: Right arm, Patient Position: Chair, Cuff Size: Adult Regular)   Pulse 93   Ht 5' 11.33\" (1.812 m)   Wt 216 lb 9.6 oz (98.2 kg)   SpO2 94%   BMI 29.93 kg/m      Questions patient would like addressed today are: N/A.    Refills are needed: N/A    Has homecare services and agency name:  [facility]iera Navarrete  "

## 2022-03-16 NOTE — PROGRESS NOTES
Dino Daniels is a 71 year old male who is presenting at the current time to discuss his diagnosi(es) of        Hyperlipidemia LDL goal <70  Essential hypertension with goal blood pressure less than 130/80  Type 2 diabetes mellitus with hyperosmolarity without coma, without long-term current use of insulin (H)  .        Dino Daniels is a 71 year old hypertensive hyperlipidemic type 2 diabetic without known CV ds who is presenting for lab f/u and annual medicare physical. He had Amaryl added on at his third to last visit. He had his A1c increase form 7.8 to 8.2 %. I advised adding Jardiance or Victoza. He declined to do so. The plan was therfore to increase dietary and lifestyle behaviors, repeat labs and return for f/u presnetly. He has  been active. His A1C had fallen from 8.2 to 6.9, but has since increased to 7.8%. I advised he use jardiance or a GLP-1 analog. He did not want to, instead wanting to lose 30# (which he did not , he instead lost 12 pounds). His A1c has now increased to 8.3%. His LDL is < 70, but his LDL-P is increasing. He used to be on Crestor , but changed to LIpitor due to cost. We changed him back to Crestor when seen six months ago and his LDL-P has normalized.     Review Of Systems  Skin: negative  Eyes: negative  Ears/Nose/Throat: negative  Respiratory: No shortness of breath, dyspnea on exertion, cough, or hemoptysis  Cardiovascular: negative  Gastrointestinal: negative  Genitourinary: negative  Musculoskeletal: negative  Neurologic: negative  Psychiatric: negative  Hematologic/Lymphatic/Immunologic: negative  Endocrine: negative        PAST MEDICAL HISTORY:                  Past Medical History:   Diagnosis Date     Arthritis     knees, left ankle and right thumb     Bladder stone      DEPRESSIVE DISORDER NEC 10/9/2006    resolved in 2009     Hypertension      Mumps      Other and unspecified hyperlipidemia     abstracted 7/17/02.     Type II or unspecified type diabetes mellitus  without mention of complication, not stated as uncontrolled        PAST SURGICAL HISTORY:                  Past Surgical History:   Procedure Laterality Date     LASER HOLMIUM LITHOTRIPSY BLADDER  6/22/2012    Procedure: LASER HOLMIUM LITHOTRIPSY BLADDER;  Cystoscopy with Removal of Bladder Stone with Holmium Laser;  Surgeon: Chaz Cobb MD;  Location: [facility] OR     TESTICLE SURGERY       VASECTOMY       VASECTOMY       wisdom teeth[         CURRENT MEDICATIONS:                  Current Outpatient Medications   Medication Sig Dispense Refill     aspirin (ASA) 81 MG EC tablet Take 1 tablet (81 mg) by mouth daily       atorvastatin (LIPITOR) 40 MG tablet Take 1 tablet (40 mg) by mouth daily 90 tablet 3     blood glucose (CONTOUR NEXT TEST) test strip 1 strip by In Vitro route 4 times daily 400 strip 3     blood glucose calibration (CONTOUR NEXT CONTROL LOW VI SOLN) Low solution Use to calibrate blood glucose monitor as directed. 1 Bottle 1     blood glucose calibration (CONTOUR NEXT CONTROL NORMAL VI SOLN) Normal solution Use to calibrate blood glucose monitor as needed as directed. 1 Bottle 1     blood glucose monitoring (CONTOUR NEXT MONITOR W/DEVICE KIT) meter device kit Use to test blood sugar 4 times daily or as directed. 1 kit 1     finasteride (PROSCAR) 5 MG tablet Take 1 tablet (5 mg) by mouth daily 90 tablet 3     glimepiride (AMARYL) 2 MG tablet Take 1 tablet (2 mg) by mouth 2 times daily 180 tablet 3     KRILL OIL OR None Entered       lisinopril (ZESTRIL) 20 MG tablet Take 1 tablet (20 mg) by mouth daily 90 tablet 3     metFORMIN (GLUCOPHAGE) 1000 MG tablet Take 1 tablet (1,000 mg) by mouth 2 times daily (with meals) 180 tablet 3     Microlet Lancets MISC Use to test blood sugar 4 times daily. 300 each 3     MULTIPLE VITAMINS CAPS   OR 1 capsule qd        ONETOUCH DELICA LANCETS 33G MISC TEST 4 TIMES DAILY 300 each 1     rosuvastatin (CRESTOR) 40 MG tablet Take 1 tablet (40 mg) by mouth daily 90  tablet 3     tamsulosin (FLOMAX) 0.4 MG capsule Take 1 capsule (0.4 mg) by mouth daily 90 capsule 3     zolpidem (AMBIEN) 10 MG tablet TAKE ONE TABLET BY MOUTH AT BEDTIME AS NEEDED FOR SLEEP 10 tablet 5       ALLERGIES:                  Allergies   Allergen Reactions     No Known Drug Allergies        SOCIAL HISTORY:                  Social History     Socioeconomic History     Marital status:      Spouse name: Not on file     Number of children: Not on file     Years of education: Not on file     Highest education level: Not on file   Occupational History     Not on file   Tobacco Use     Smoking status: Never Smoker     Smokeless tobacco: Never Used   Substance and Sexual Activity     Alcohol use: Yes     Alcohol/week: 0.0 standard drinks     Comment: rarely     Drug use: No     Sexual activity: Not on file   Other Topics Concern     Parent/sibling w/ CABG, MI or angioplasty before 65F 55M? Not Asked   Social History Narrative     Not on file     Social Determinants of Health     Financial Resource Strain: Not on file   Food Insecurity: Not on file   Transportation Needs: Not on file   Physical Activity: Not on file   Stress: Not on file   Social Connections: Not on file   Intimate Partner Violence: Not on file   Housing Stability: Not on file       FAMILY HISTORY:                   Family History   Problem Relation Age of Onset      () Father          age 56 leukemia     Cerebrovascular Disease Mother       () Brother         b,      () Sister         b,4 back problems      () Brother         b,5 back problems         Physical exam Reveals:    O/P: WNL  HEENT: WNL  NECK: No JVD, thyromegaly, or lymphadenopathy  HEART: RRR, no murmurs, gallops, or rubs  LUNGS: CTA bilaterally without rales, wheezes, or rhonchi  GI: NABS, nondistended, nontender, soft  EXT:without cyanosis, clubbing, or edema  NEURO: nonfocal  : no flank tenderness      Component      Latest Ref Rng & Units 2021  11/15/2021 2/16/2022   WBC      4.0 - 11.0 10e3/uL   6.1   RBC Count      4.40 - 5.90 10e6/uL   4.74   Hemoglobin      13.3 - 17.7 g/dL   14.1   Hematocrit      40.0 - 53.0 %   42.1   MCV      78 - 100 fL   89   MCH      26.5 - 33.0 pg   29.7   MCHC      31.5 - 36.5 g/dL   33.5   RDW      10.0 - 15.0 %   14.0   Platelet Count      150 - 450 10e3/uL   158   % Neutrophils      %   53   % Lymphocytes      %   35   % Monocytes      %   8   % Eosinophils      %   3   % Basophils      %   1   Absolute Neutrophils      1.6 - 8.3 10e3/uL   3.2   Absolute Lymphocytes      0.8 - 5.3 10e3/uL   2.1   Absolute Monocytes      0.0 - 1.3 10e3/uL   0.5   Absolute Eosinophils      0.0 - 0.7 10e3/uL   0.2   Absolute Basophils      0.0 - 0.2 10e3/uL   0.0   Sodium      133 - 144 mmol/L 136  139   Potassium      3.4 - 5.3 mmol/L 4.8  4.3   Chloride      94 - 109 mmol/L 105  108   Carbon Dioxide      20 - 32 mmol/L 26  23   Anion Gap      3 - 14 mmol/L 5  8   Urea Nitrogen      7 - 30 mg/dL 20  19   Creatinine      0.66 - 1.25 mg/dL 0.97  0.94   Calcium      8.5 - 10.1 mg/dL 9.8  9.1   Glucose      70 - 99 mg/dL 192 (H)  205 (H)   Alkaline Phosphatase      40 - 150 U/L   32 (L)   AST      0 - 45 U/L   17   ALT      0 - 70 U/L   37   Protein Total      6.8 - 8.8 g/dL   7.2   Albumin      3.4 - 5.0 g/dL   3.8   Bilirubin Total      0.2 - 1.3 mg/dL   0.5   GFR Estimate      >60 mL/min/1.73m2 79  87   Total Cholesterol      <=199 mg/dL 137     Triglycerides      30 - 149 mg/dL 189 (H)     HDL Cholesterol      40 - 59 mg/dL 31 (L)     LDL Cholesterol      <=129 mg/dL 68  44   HDL Size      >=8.9 nm 8.3 (L)     VLDL Size      <=46.7 nm 48.8 (H)     LDL Particle Size      >=20.7 nm 20.3 (L)     Lge HDL Particle number      >=4.2 umol/L <2.8 (L)     HDL Particle Number NMR      >=33.0 umol/L 25.5 (L)     Lge VLDL Part number      <=2.7 nmol/L 3.6 (H)     Small LDL Particle number      <=634 nmol/L 856 (H)     LDL Particle Number      <=1135  nmol/L 1296 (H)  907   EER LipoFit by NMR       See Note     Creatinine Urine      mg/dL 118  166   Albumin Urine mg/L      mg/L <5  10   Albumin Urine mg/g Cr      0.00 - 17.00 mg/g Cr   6.02   TSH      0.40 - 4.00 mU/L 1.59  2.12   Free T3      2.3 - 4.2 pg/mL 2.5  2.7   T4 Free      0.76 - 1.46 ng/dL 0.78  0.86   Hemoglobin A1C      0.0 - 5.6 % 7.8 (H)  8.3 (H)   SARS CoV2 PCR      Negative, Testing sent to reference lab. Results will be returned via unsolicited result  Negative               A/P:      (E78.5) Hyperlipidemia LDL goal <70  Comment: at goal, continue Crestor  Plan: LipoFit by NMR, Lipoprotein (a), CRP cardiac         risk, TSH, T4 free, T3 Free, CBC with platelets        differential           (I10) Essential hypertension with goal blood pressure less than 130/80  Comment: at goal  Plan: no med changes    (E11.00) Type 2 diabetes mellitus with A1C goal < 7 %  Comment: not at goal, failed with weight loss. Start Victoza, titrate up to 1.8 mg daily as prescribed. Get labs in [place] two weeks later.  Plan: liraglutide (VICTOZA) 18 MG/3ML solution,         LipoFit by NMR, Lipoprotein (a), CRP cardiac         risk, Hemoglobin A1c                  30 minutes total medical care on today's date.

## 2022-03-25 DIAGNOSIS — F51.11 PRIMARY HYPERSOMNIA: ICD-10-CM

## 2022-03-29 RX ORDER — ZOLPIDEM TARTRATE 10 MG/1
TABLET ORAL
Qty: 10 TABLET | Refills: 1 | Status: SHIPPED | OUTPATIENT
Start: 2022-03-29 | End: 2022-06-14

## 2022-04-18 ENCOUNTER — TELEPHONE (OUTPATIENT)
Dept: OTHER | Facility: CLINIC | Age: 72
End: 2022-04-18
Payer: COMMERCIAL

## 2022-04-18 DIAGNOSIS — I10 ESSENTIAL HYPERTENSION WITH GOAL BLOOD PRESSURE LESS THAN 130/80: ICD-10-CM

## 2022-04-18 DIAGNOSIS — E11.00 TYPE 2 DIABETES MELLITUS WITH HYPEROSMOLARITY WITHOUT COMA, WITHOUT LONG-TERM CURRENT USE OF INSULIN (H): Primary | ICD-10-CM

## 2022-04-18 DIAGNOSIS — Z01.818 PRE-OP EXAM: ICD-10-CM

## 2022-04-18 NOTE — TELEPHONE ENCOUNTER
[facility]    Who is the name of the provider?:  Rebecca      What is the location you see this provider at?: [place]    Reason for call:  On 4/27/22 Dr. Cholo Mobley with [facility] will be performing surgery for cataract for patient.    For the pre Op exam, patient was wondering if previous exam could be used.    If previous evalution can be used, please fax this to [facility] c/o Dr. Cholo Mobley:    [phone]    If previous pre OP cannot be used, please advise.    Can we leave a detailed message on this number?  YES

## 2022-04-18 NOTE — TELEPHONE ENCOUNTER
Please advise patient that most surgeries require a preoperative exam within 30 days of surgery.      Patient will need the following prior to 4/27/22:      fasting labs    In clinic pre-operartive exam     The exam may be scheduled with HOLDEN Martínez RN BSN  [facility]  [phone]

## 2022-04-18 NOTE — TELEPHONE ENCOUNTER
Patient declined to schedule.     States that he will not be able to fit in labs and in clinc visit before 4/27/22, due to work schedule. He is going to look into rescheduling his cataratc surgery and then call us back to schedule pre op appointment.       Denise Meyer    [facility]   [phone]

## 2022-04-18 NOTE — TELEPHONE ENCOUNTER
ANGIE to schedule.       Denise Meyer    [facility]   [phone]

## 2022-04-19 NOTE — TELEPHONE ENCOUNTER
Patient rescheduled his cataract surgery for 5/18/22. Pre-op is now scheduled as follows:    Future Appointments   Date Time Provider Department Center   4/27/2022 10:45 AM CR LAB CRLABR CR   5/3/2022  3:00 PM Abel Fernandez MD [facility]         Denise Meyer    [facility]   [phone]     Pt asleep no signs of distress, NSR on cardiac monitor.

## 2022-04-27 ENCOUNTER — LAB (OUTPATIENT)
Dept: LAB | Facility: CLINIC | Age: 72
End: 2022-04-27
Payer: COMMERCIAL

## 2022-04-27 DIAGNOSIS — Z01.818 PRE-OP EXAM: ICD-10-CM

## 2022-04-27 DIAGNOSIS — E11.00 TYPE 2 DIABETES MELLITUS WITH HYPEROSMOLARITY WITHOUT COMA, WITHOUT LONG-TERM CURRENT USE OF INSULIN (H): ICD-10-CM

## 2022-04-27 DIAGNOSIS — E78.5 HYPERLIPIDEMIA LDL GOAL <70: ICD-10-CM

## 2022-04-27 DIAGNOSIS — I10 ESSENTIAL HYPERTENSION WITH GOAL BLOOD PRESSURE LESS THAN 130/80: ICD-10-CM

## 2022-04-27 LAB
ANION GAP SERPL CALCULATED.3IONS-SCNC: 6 MMOL/L (ref 3–14)
BASOPHILS # BLD AUTO: 0 10E3/UL (ref 0–0.2)
BASOPHILS NFR BLD AUTO: 0 %
BUN SERPL-MCNC: 16 MG/DL (ref 7–30)
CALCIUM SERPL-MCNC: 10 MG/DL (ref 8.5–10.1)
CHLORIDE BLD-SCNC: 106 MMOL/L (ref 94–109)
CO2 SERPL-SCNC: 25 MMOL/L (ref 20–32)
CREAT SERPL-MCNC: 0.93 MG/DL (ref 0.66–1.25)
CRP SERPL HS-MCNC: 1.2 MG/L
EOSINOPHIL # BLD AUTO: 0.1 10E3/UL (ref 0–0.7)
EOSINOPHIL NFR BLD AUTO: 2 %
ERYTHROCYTE [DISTWIDTH] IN BLOOD BY AUTOMATED COUNT: 14 % (ref 10–15)
GFR SERPL CREATININE-BSD FRML MDRD: 88 ML/MIN/1.73M2
GLUCOSE BLD-MCNC: 116 MG/DL (ref 70–99)
HBA1C MFR BLD: 6.9 % (ref 0–5.6)
HCT VFR BLD AUTO: 44.7 % (ref 40–53)
HGB BLD-MCNC: 15.1 G/DL (ref 13.3–17.7)
LYMPHOCYTES # BLD AUTO: 2.5 10E3/UL (ref 0.8–5.3)
LYMPHOCYTES NFR BLD AUTO: 38 %
MCH RBC QN AUTO: 30 PG (ref 26.5–33)
MCHC RBC AUTO-ENTMCNC: 33.8 G/DL (ref 31.5–36.5)
MCV RBC AUTO: 89 FL (ref 78–100)
MONOCYTES # BLD AUTO: 0.5 10E3/UL (ref 0–1.3)
MONOCYTES NFR BLD AUTO: 8 %
NEUTROPHILS # BLD AUTO: 3.5 10E3/UL (ref 1.6–8.3)
NEUTROPHILS NFR BLD AUTO: 53 %
PLATELET # BLD AUTO: 190 10E3/UL (ref 150–450)
POTASSIUM BLD-SCNC: 4.8 MMOL/L (ref 3.4–5.3)
RBC # BLD AUTO: 5.04 10E6/UL (ref 4.4–5.9)
SODIUM SERPL-SCNC: 137 MMOL/L (ref 133–144)
T3FREE SERPL-MCNC: 2.6 PG/ML (ref 2.3–4.2)
T4 FREE SERPL-MCNC: 0.93 NG/DL (ref 0.76–1.46)
TSH SERPL DL<=0.005 MIU/L-ACNC: 1.39 MU/L (ref 0.4–4)
WBC # BLD AUTO: 6.7 10E3/UL (ref 4–11)

## 2022-04-27 PROCEDURE — 85025 COMPLETE CBC W/AUTO DIFF WBC: CPT

## 2022-04-27 PROCEDURE — 84481 FREE ASSAY (FT-3): CPT

## 2022-04-27 PROCEDURE — 36415 COLL VENOUS BLD VENIPUNCTURE: CPT

## 2022-04-27 PROCEDURE — 99000 SPECIMEN HANDLING OFFICE-LAB: CPT

## 2022-04-27 PROCEDURE — 84439 ASSAY OF FREE THYROXINE: CPT

## 2022-04-27 PROCEDURE — 83695 ASSAY OF LIPOPROTEIN(A): CPT | Mod: 90

## 2022-04-27 PROCEDURE — 86141 C-REACTIVE PROTEIN HS: CPT

## 2022-04-27 PROCEDURE — 83704 LIPOPROTEIN BLD QUAN PART: CPT | Mod: 90

## 2022-04-27 PROCEDURE — 80061 LIPID PANEL: CPT | Mod: 90

## 2022-04-27 PROCEDURE — 83036 HEMOGLOBIN GLYCOSYLATED A1C: CPT

## 2022-04-27 PROCEDURE — 80048 BASIC METABOLIC PNL TOTAL CA: CPT

## 2022-04-27 PROCEDURE — 84443 ASSAY THYROID STIM HORMONE: CPT

## 2022-04-29 LAB — LPA SERPL-MCNC: 20 MG/DL

## 2022-05-02 LAB
CHOLEST SERPL-MCNC: 100 MG/DL
HDL SERPL QN: 8.3 NM
HDL SERPL-SCNC: 28.8 UMOL/L
HDLC SERPL-MCNC: 35 MG/DL
HLD.LARGE SERPL-SCNC: <2.8 UMOL/L
LDL SERPL QN: 20.5 NM
LDL SERPL-SCNC: 919 NMOL/L
LDL SMALL SERPL-SCNC: 646 NMOL/L
LDLC SERPL CALC-MCNC: 41 MG/DL
PATHOLOGY STUDY: ABNORMAL
TRIGL SERPL-MCNC: 121 MG/DL
VLDL LARGE SERPL-SCNC: 1.9 NMOL/L
VLDL SERPL QN: 47.9 NM

## 2022-05-03 ENCOUNTER — OFFICE VISIT (OUTPATIENT)
Dept: SURGERY | Facility: CLINIC | Age: 72
End: 2022-05-03
Payer: COMMERCIAL

## 2022-05-03 VITALS
RESPIRATION RATE: 16 BRPM | SYSTOLIC BLOOD PRESSURE: 112 MMHG | WEIGHT: 216 LBS | DIASTOLIC BLOOD PRESSURE: 72 MMHG | HEART RATE: 89 BPM | HEIGHT: 71 IN | BODY MASS INDEX: 30.24 KG/M2 | OXYGEN SATURATION: 97 %

## 2022-05-03 DIAGNOSIS — E11.00 TYPE 2 DIABETES MELLITUS WITH HYPEROSMOLARITY WITHOUT COMA, WITHOUT LONG-TERM CURRENT USE OF INSULIN (H): ICD-10-CM

## 2022-05-03 DIAGNOSIS — R33.9 URINARY RETENTION: ICD-10-CM

## 2022-05-03 DIAGNOSIS — E11.9 TYPE 2 DIABETES MELLITUS WITH HEMOGLOBIN A1C GOAL OF 7.0%-8.0% (H): ICD-10-CM

## 2022-05-03 DIAGNOSIS — I10 ESSENTIAL HYPERTENSION WITH GOAL BLOOD PRESSURE LESS THAN 130/80: ICD-10-CM

## 2022-05-03 DIAGNOSIS — Z01.818 PREOPERATIVE EXAMINATION: ICD-10-CM

## 2022-05-03 DIAGNOSIS — E78.5 HYPERLIPIDEMIA LDL GOAL <70: Primary | ICD-10-CM

## 2022-05-03 PROCEDURE — 99214 OFFICE O/P EST MOD 30 MIN: CPT | Performed by: INTERNAL MEDICINE

## 2022-05-03 RX ORDER — LISINOPRIL 20 MG/1
20 TABLET ORAL DAILY
Qty: 90 TABLET | Refills: 3 | Status: SHIPPED | OUTPATIENT
Start: 2022-05-03 | End: 2022-06-14

## 2022-05-03 RX ORDER — LIRAGLUTIDE 6 MG/ML
INJECTION SUBCUTANEOUS
Qty: 9 ML | Refills: 11 | Status: SHIPPED | OUTPATIENT
Start: 2022-05-03 | End: 2022-06-03

## 2022-05-03 RX ORDER — TAMSULOSIN HYDROCHLORIDE 0.4 MG/1
0.4 CAPSULE ORAL DAILY
Qty: 90 CAPSULE | Refills: 3 | Status: SHIPPED | OUTPATIENT
Start: 2022-05-03 | End: 2022-06-14

## 2022-05-03 RX ORDER — ROSUVASTATIN CALCIUM 40 MG/1
40 TABLET, COATED ORAL DAILY
Qty: 90 TABLET | Refills: 3 | Status: SHIPPED | OUTPATIENT
Start: 2022-05-03 | End: 2022-06-14

## 2022-05-03 RX ORDER — FINASTERIDE 5 MG/1
5 TABLET, FILM COATED ORAL DAILY
Qty: 90 TABLET | Refills: 3 | Status: SHIPPED | OUTPATIENT
Start: 2022-05-03 | End: 2022-06-14

## 2022-05-03 RX ORDER — GLIMEPIRIDE 2 MG/1
2 TABLET ORAL 2 TIMES DAILY
Qty: 180 TABLET | Refills: 3 | Status: SHIPPED | OUTPATIENT
Start: 2022-05-03 | End: 2022-06-14

## 2022-05-03 NOTE — PROGRESS NOTES
Dino Daniels is a 71 year old male who is presenting at the current time to discuss his diagnosi(es) of          Hyperlipidemia LDL goal <70  Essential hypertension with goal blood pressure less than 130/80  Type 2 diabetes mellitus with hyperosmolarity without coma, without long-term current use of insulin (H)  Urinary retention  Preoperative examination  .           Dino Daniels is a 71 year old hypertensive hyperlipidemic type 2 diabetic without known CV ds who is presenting for lab f/u and annual medicare physical. He had Amaryl added on at his third to last visit. He had his A1c increase form 7.8 to 8.2 %. I advised adding Jardiance or Victoza. He declined to do so. The plan was therfore to increase dietary and lifestyle behaviors, repeat labs and return for f/u presnetly. He has  been active. His A1C had fallen from 8.2 to 6.9, but has since increased to 7.8%. I advised he use jardiance or a GLP-1 analog. He did not want to, instead wanting to lose 30# (which he did not , he instead lost 12 pounds). His A1c had increased to 8.3% so Victoza was added with a subsequent drop in A1c back to 6.9%. His LDL is < 70, but his LDL-P was increasing. He used to be on Crestor , but changed to LIpitor due to cost. We changed him back to Crestor when seen nine months ago and his LDL-P has normalized.        He is also here today for a preop exam for cataract extraction and IOL implantation to be performed on 5/128/22 at [address] [place] [address] by Dr. Cholo Mobley.       Review Of Systems  Skin: negative  Eyes: impaired vision OD  Ears/Nose/Throat: negative  Respiratory: No shortness of breath, dyspnea on exertion, cough, or hemoptysis  Cardiovascular: negative  Gastrointestinal: negative  Genitourinary: negative  Musculoskeletal: negative  Neurologic: negative  Psychiatric: negative  Hematologic/Lymphatic/Immunologic: negative  Endocrine: negative        PAST MEDICAL HISTORY:                  Past Medical History:    Diagnosis Date     Arthritis     knees, left ankle and right thumb     Bladder stone      DEPRESSIVE DISORDER NEC 10/9/2006    resolved in 2009     Hypertension      Mumps      Other and unspecified hyperlipidemia     abstracted 7/17/02.     Type II or unspecified type diabetes mellitus without mention of complication, not stated as uncontrolled        PAST SURGICAL HISTORY:                  Past Surgical History:   Procedure Laterality Date     LASER HOLMIUM LITHOTRIPSY BLADDER  6/22/2012    Procedure: LASER HOLMIUM LITHOTRIPSY BLADDER;  Cystoscopy with Removal of Bladder Stone with Holmium Laser;  Surgeon: Chaz Cobb MD;  Location: [facility] OR     TESTICLE SURGERY       VASECTOMY       VASECTOMY       wisdom teeth[         CURRENT MEDICATIONS:                  Current Outpatient Medications   Medication Sig Dispense Refill     aspirin (ASA) 81 MG EC tablet Take 1 tablet (81 mg) by mouth daily       atorvastatin (LIPITOR) 40 MG tablet Take 1 tablet (40 mg) by mouth daily 90 tablet 3     blood glucose calibration (CONTOUR NEXT CONTROL LOW VI SOLN) Low solution Use to calibrate blood glucose monitor as directed. 1 Bottle 1     blood glucose calibration (CONTOUR NEXT CONTROL NORMAL VI SOLN) Normal solution Use to calibrate blood glucose monitor as needed as directed. 1 Bottle 1     blood glucose monitoring (CONTOUR NEXT MONITOR W/DEVICE KIT) meter device kit Use to test blood sugar 4 times daily or as directed. 1 kit 1     CONTOUR NEXT TEST test strip TEST 4 TIMES DAILY 400 strip 3     finasteride (PROSCAR) 5 MG tablet Take 1 tablet (5 mg) by mouth daily 90 tablet 3     glimepiride (AMARYL) 2 MG tablet Take 1 tablet (2 mg) by mouth 2 times daily 180 tablet 3     insulin pen needle (32G X 6 MM) 32G X 6 MM miscellaneous Use 1pen needle daily or as directed. 30 each 3     KRILL OIL OR None Entered       liraglutide (VICTOZA) 18 MG/3ML solution 0.6 mg SQ daily for two weeks then 1.2 mg SQ daily for two weeks,  then 1.8 mg SQ daily thereafter. 9 mL 11     lisinopril (ZESTRIL) 20 MG tablet Take 1 tablet (20 mg) by mouth daily 90 tablet 3     metFORMIN (GLUCOPHAGE) 1000 MG tablet Take 1 tablet (1,000 mg) by mouth 2 times daily (with meals) 180 tablet 3     Microlet Lancets MISC Use to test blood sugar 4 times daily. 300 each 3     MULTIPLE VITAMINS CAPS   OR 1 capsule qd        ONETOUCH DELICA LANCETS 33G MISC TEST 4 TIMES DAILY 300 each 1     rosuvastatin (CRESTOR) 40 MG tablet Take 1 tablet (40 mg) by mouth daily 90 tablet 3     tamsulosin (FLOMAX) 0.4 MG capsule Take 1 capsule (0.4 mg) by mouth daily 90 capsule 3     zolpidem (AMBIEN) 10 MG tablet TAKE 1 TABLET BY MOUTH AT BEDTIME AS NEEDED FOR SLEEP 10 tablet 1       ALLERGIES:                  Allergies   Allergen Reactions     No Known Drug Allergies        SOCIAL HISTORY:                  Social History     Socioeconomic History     Marital status:      Spouse name: Not on file     Number of children: Not on file     Years of education: Not on file     Highest education level: Not on file   Occupational History     Not on file   Tobacco Use     Smoking status: Never Smoker     Smokeless tobacco: Never Used   Substance and Sexual Activity     Alcohol use: Yes     Alcohol/week: 0.0 standard drinks     Comment: rarely     Drug use: No     Sexual activity: Not on file   Other Topics Concern     Parent/sibling w/ CABG, MI or angioplasty before 65F 55M? Not Asked   Social History Narrative     Not on file     Social Determinants of Health     Financial Resource Strain: Not on file   Food Insecurity: Not on file   Transportation Needs: Not on file   Physical Activity: Not on file   Stress: Not on file   Social Connections: Not on file   Intimate Partner Violence: Not on file   Housing Stability: Not on file       FAMILY HISTORY:                   Family History   Problem Relation Age of Onset      () Father          age 56 leukemia     Cerebrovascular Disease  Mother       () Brother         b,1952      () Sister         b,1954 back problems      () Brother         b,1955 back problems             Physical exam Reveals:    O/P: WNL  HEENT: WNL, cataract OD  NECK: No JVD, thyromegaly, or lymphadenopathy  HEART: RRR, no murmurs, gallops, or rubs  LUNGS: CTA bilaterally without rales, wheezes, or rhonchi  GI: NABS, nondistended, nontender, soft  EXT:without cyanosis, clubbing, or edema  NEURO: nonfocal  : no flank tenderness      Component      Latest Ref Rng & Units 7/21/2021 2/16/2022 4/27/2022   WBC      4.0 - 11.0 10e3/uL  6.1 6.7   RBC Count      4.40 - 5.90 10e6/uL  4.74 5.04   Hemoglobin      13.3 - 17.7 g/dL  14.1 15.1   Hematocrit      40.0 - 53.0 %  42.1 44.7   MCV      78 - 100 fL  89 89   MCH      26.5 - 33.0 pg  29.7 30.0   MCHC      31.5 - 36.5 g/dL  33.5 33.8   RDW      10.0 - 15.0 %  14.0 14.0   Platelet Count      150 - 450 10e3/uL  158 190   % Neutrophils      %  53 53   % Lymphocytes      %  35 38   % Monocytes      %  8 8   % Eosinophils      %  3 2   % Basophils      %  1 0   Absolute Neutrophils      1.6 - 8.3 10e3/uL  3.2 3.5   Absolute Lymphocytes      0.8 - 5.3 10e3/uL  2.1 2.5   Absolute Monocytes      0.0 - 1.3 10e3/uL  0.5 0.5   Absolute Eosinophils      0.0 - 0.7 10e3/uL  0.2 0.1   Absolute Basophils      0.0 - 0.2 10e3/uL  0.0 0.0   Sodium      133 - 144 mmol/L 136 139 137   Potassium      3.4 - 5.3 mmol/L 4.8 4.3 4.8   Chloride      94 - 109 mmol/L 105 108 106   Carbon Dioxide      20 - 32 mmol/L 26 23 25   Anion Gap      3 - 14 mmol/L 5 8 6   Urea Nitrogen      7 - 30 mg/dL 20 19 16   Creatinine      0.66 - 1.25 mg/dL 0.97 0.94 0.93   Calcium      8.5 - 10.1 mg/dL 9.8 9.1 10.0   Glucose      70 - 99 mg/dL 192 (H) 205 (H) 116 (H)   Alkaline Phosphatase      40 - 150 U/L  32 (L)    AST      0 - 45 U/L  17    ALT      0 - 70 U/L  37    Protein Total      6.8 - 8.8 g/dL  7.2    Albumin      3.4 - 5.0 g/dL  3.8    Bilirubin Total      0.2 - 1.3  mg/dL  0.5    GFR Estimate      >60 mL/min/1.73m2 79 87 88   Total Cholesterol      <=199 mg/dL 137  100   Triglycerides      30 - 149 mg/dL 189 (H)  121   HDL Cholesterol      40 - 59 mg/dL 31 (L)  35 (L)   LDL Cholesterol      <=129 mg/dL 68  41   HDL Size      >=8.9 nm 8.3 (L)  8.3 (L)   VLDL Size      <=46.7 nm 48.8 (H)  47.9 (H)   LDL Particle Size      >=20.7 nm 20.3 (L)  20.5 (L)   Lge HDL Particle number      >=4.2 umol/L <2.8 (L)  <2.8 (L)   HDL Particle Number NMR      >=33.0 umol/L 25.5 (L)  28.8 (L)   Lge VLDL Part number      <=2.7 nmol/L 3.6 (H)  1.9   Small LDL Particle number      <=634 nmol/L 856 (H)  646 (H)   LDL Particle Number      <=1135 nmol/L 1296 (H)  919   EER LipoFit by NMR       See Note  See Note   Creatinine Urine      mg/dL 118 166    Albumin Urine mg/L      mg/L <5 10    Albumin Urine mg/g Cr      0.00 - 17.00 mg/g Cr  6.02    TSH      0.40 - 4.00 mU/L 1.59 2.12 1.39   Free T3      2.3 - 4.2 pg/mL 2.5 2.7 2.6   T4 Free      0.76 - 1.46 ng/dL 0.78 0.86 0.93   Hemoglobin A1C      0.0 - 5.6 % 7.8 (H) 8.3 (H) 6.9 (H)   Lipoprotein (a)      <=29 mg/dL   20   C-Reactive Protein High Sensitivity      mg/L   1.2         A/P:            (E78.5) Hyperlipidemia LDL goal <70  Comment: at goal, continue Crestor  Plan: LipoFit by NMR, Lipoprotein (a), CRP cardiac         risk, TSH, T4 free, T3 Free, CBC with platelets        differential in six months , rosuvastatin (CRESTOR) 40 MG tablet            (I10) Essential hypertension with goal blood pressure less than 130/80  Comment: at goal BP  Plan: no med changes, (ZESTRIL) 20 MG tablet     (E11.00) Type 2 diabetes mellitus with A1C goal < 7 %  Comment: at goal, failed control with weight loss. Started Victoza, and now at goal. Get labs in 6 months, if loses weight further to 185# and maintains  That weight loss for three additonal months, consider Victoza cessation in the future. RTC two weeks later.  Plan: liraglutide (VICTOZA) 18 MG/3ML solution,          glimepiride (AMARYL) 2 MG tablet, metFORMIN         (GLUCOPHAGE) 1000 MG tablet,         LipoFit by NMR, Lipoprotein (a), CRP cardiac         risk, Hemoglobin A1c              (R33.9) Urinary retention  Plan: tamsulosin (FLOMAX) 0.4 MG capsule, finasteride        (PROSCAR) 5 MG tablet            (Z01.818) Preoperative examination  Comment: Pt is getting this undertaken at the [address] [place] [address]. He is medically cleared for cataract extraction, IOL OD  Plan: Fax this exam to [phone]. Hold all diabetic meds the day of surgery given that he will be NPO after MN on that date.      32 minutes total medical care on today's date.

## 2022-05-03 NOTE — TELEPHONE ENCOUNTER
"  blood glucose (CONTOUR NEXT TEST) test strip  Last Written Prescription Date:  7/7/21  Last Fill Quantity: 400,  # refills: 3     Last office visit: 8/10/2021      Future Office Visit:   Next 5 appointments (look out 90 days)    May 03, 2022  3:00 PM  Return Visit with Abel Fernandez MD  [facility] ([facility]) [address]  [facility] [id number] [phone]         Requested Prescriptions   Signed Prescriptions Disp Refills    CONTOUR NEXT TEST test strip 400 strip 3     Sig: TEST 4 TIMES DAILY       Diabetic Supplies Protocol Passed - 5/3/2022 12:03 PM        Passed - Medication is active on med list        Passed - Patient is 18 years of age or older        Passed - Recent (6 mo) or future (30 days) visit within the authorizing provider's specialty     Patient had office visit in the last 6 months or has a visit in the next 30 days with authorizing provider.  See \"Patient Info\" tab in inbasket, or \"Choose Columns\" in Meds & Orders section of the refill encounter.               Prescription approved per [facility] Refill Protocol.        "

## 2022-05-04 NOTE — PROGRESS NOTES
Pre-op examination faxed to [address] [place] [address] [phone] via [employer] Fax.

## 2022-05-10 ENCOUNTER — MYC MEDICAL ADVICE (OUTPATIENT)
Dept: SURGERY | Facility: CLINIC | Age: 72
End: 2022-05-10
Payer: COMMERCIAL

## 2022-05-19 ENCOUNTER — MYC MEDICAL ADVICE (OUTPATIENT)
Dept: SURGERY | Facility: CLINIC | Age: 72
End: 2022-05-19
Payer: COMMERCIAL

## 2022-05-19 DIAGNOSIS — R33.9 URINARY RETENTION: Primary | ICD-10-CM

## 2022-05-19 NOTE — TELEPHONE ENCOUNTER
Ok to refer patient for urinary retention?    Nancy HEADLEYN, RN    [facility]  Office: [phone]  Fax: [phone]

## 2022-05-19 NOTE — TELEPHONE ENCOUNTER
Yes, but I do not know any one in the [place] area. Please contact him to give the name of a specific urologist he wants tot see, and I'll refer him to that individual.

## 2022-06-03 ENCOUNTER — MYC MEDICAL ADVICE (OUTPATIENT)
Dept: SURGERY | Facility: CLINIC | Age: 72
End: 2022-06-03
Payer: COMMERCIAL

## 2022-06-03 DIAGNOSIS — E78.5 HYPERLIPIDEMIA LDL GOAL <100: ICD-10-CM

## 2022-06-03 DIAGNOSIS — E11.00 TYPE 2 DIABETES MELLITUS WITH HYPEROSMOLARITY WITHOUT COMA, WITHOUT LONG-TERM CURRENT USE OF INSULIN (H): ICD-10-CM

## 2022-06-03 DIAGNOSIS — E11.9 TYPE 2 DIABETES MELLITUS WITH HEMOGLOBIN A1C GOAL OF 7.0%-8.0% (H): ICD-10-CM

## 2022-06-03 RX ORDER — LIRAGLUTIDE 6 MG/ML
INJECTION SUBCUTANEOUS
Qty: 27 ML | Refills: 3 | Status: SHIPPED | OUTPATIENT
Start: 2022-06-03 | End: 2022-06-14

## 2022-06-28 ENCOUNTER — TELEPHONE (OUTPATIENT)
Dept: OTHER | Facility: CLINIC | Age: 72
End: 2022-06-28

## 2022-06-28 DIAGNOSIS — E78.5 HYPERLIPIDEMIA LDL GOAL <70: ICD-10-CM

## 2022-06-28 RX ORDER — ROSUVASTATIN CALCIUM 40 MG/1
40 TABLET, COATED ORAL DAILY
Qty: 90 TABLET | Refills: 3 | Status: SHIPPED | OUTPATIENT
Start: 2022-06-28 | End: 2022-11-16

## 2022-06-28 NOTE — TELEPHONE ENCOUNTER
[facility]    Who is the provider?:  Rebecca      Preferred provider location: [place]    Person calling: [employer]  Call back phone: [phone]       Nurse call back needed: YES          Can we leave a message?  YES        Reason for call:    A pharmacist called about this patients concurrent prescriptions for Lipitor AND Crestor and wanted to make sure that tthis was correct. You can speak to any pharmacist at the above number per reference number     [phone]    Outside imaging: n/a    Name / Loc of pharmacy: [facility] - [facility], [address]

## 2022-09-05 DIAGNOSIS — E11.00 TYPE 2 DIABETES MELLITUS WITH HYPEROSMOLARITY WITHOUT COMA, WITHOUT LONG-TERM CURRENT USE OF INSULIN (H): ICD-10-CM

## 2022-09-06 RX ORDER — GLIMEPIRIDE 2 MG/1
2 TABLET ORAL 2 TIMES DAILY
Qty: 180 TABLET | Refills: 2 | Status: SHIPPED | OUTPATIENT
Start: 2022-09-06 | End: 2023-09-06 | Stop reason: DRUGHIGH

## 2022-09-06 NOTE — TELEPHONE ENCOUNTER
-- DO NOT REPLY / DO NOT REPLY ALL --  -- Message is from the [facility]--    COVID-19 Universal Screening: Negative    General Patient Message      Reason for Call: FYI protocol message: Declines appt within 4 hours, accepts appt at 2:40pm today with Dr Swann for left leg swelling and pelvic and vaginal pain.      Caller Information       Type Contact Phone    05/12/2021 09:16 AM CDT Phone (Incoming) Gisselle Bonds (Self) [phone] (M)          Alternative phone number: [phone]    Turnaround time given to caller:   \"This message will be sent to [state Provider's name]. The clinical team will fulfill your request as soon as they review your message.\"     glimepiride (AMARYL) 2 MG tablet  Last Written Prescription Date:  6/14/22  Last Fill Quantity: 180,  # refills: 3    Pharmacy transfer.

## 2022-10-16 ENCOUNTER — HEALTH MAINTENANCE LETTER (OUTPATIENT)
Age: 72
End: 2022-10-16

## 2022-11-02 ENCOUNTER — LAB (OUTPATIENT)
Dept: LAB | Facility: CLINIC | Age: 72
End: 2022-11-02
Payer: COMMERCIAL

## 2022-11-02 DIAGNOSIS — I10 ESSENTIAL HYPERTENSION WITH GOAL BLOOD PRESSURE LESS THAN 130/80: ICD-10-CM

## 2022-11-02 DIAGNOSIS — E78.5 HYPERLIPIDEMIA LDL GOAL <70: ICD-10-CM

## 2022-11-02 LAB
ALBUMIN SERPL-MCNC: 4.1 G/DL (ref 3.4–5)
ALP SERPL-CCNC: 39 U/L (ref 40–150)
ALT SERPL W P-5'-P-CCNC: 40 U/L (ref 0–70)
ANION GAP SERPL CALCULATED.3IONS-SCNC: 5 MMOL/L (ref 3–14)
APO A-I SERPL-MCNC: 16 MG/DL
AST SERPL W P-5'-P-CCNC: 22 U/L (ref 0–45)
BILIRUB SERPL-MCNC: 0.5 MG/DL (ref 0.2–1.3)
BUN SERPL-MCNC: 20 MG/DL (ref 7–30)
CALCIUM SERPL-MCNC: 9.8 MG/DL (ref 8.5–10.1)
CHLORIDE BLD-SCNC: 106 MMOL/L (ref 94–109)
CO2 SERPL-SCNC: 26 MMOL/L (ref 20–32)
CREAT SERPL-MCNC: 0.83 MG/DL (ref 0.66–1.25)
GFR SERPL CREATININE-BSD FRML MDRD: >90 ML/MIN/1.73M2
GLUCOSE BLD-MCNC: 224 MG/DL (ref 70–99)
HBA1C MFR BLD: 8.3 % (ref 0–5.6)
POTASSIUM BLD-SCNC: 4.8 MMOL/L (ref 3.4–5.3)
PROT SERPL-MCNC: 7.6 G/DL (ref 6.8–8.8)
SODIUM SERPL-SCNC: 137 MMOL/L (ref 133–144)
T3FREE SERPL-MCNC: 2.9 PG/ML (ref 2–4.4)
T4 FREE SERPL-MCNC: 0.9 NG/DL (ref 0.76–1.46)
TSH SERPL DL<=0.005 MIU/L-ACNC: 2.21 MU/L (ref 0.4–4)

## 2022-11-02 PROCEDURE — 83695 ASSAY OF LIPOPROTEIN(A): CPT

## 2022-11-02 PROCEDURE — 80061 LIPID PANEL: CPT | Mod: 90

## 2022-11-02 PROCEDURE — 84481 FREE ASSAY (FT-3): CPT

## 2022-11-02 PROCEDURE — 83704 LIPOPROTEIN BLD QUAN PART: CPT | Mod: 90

## 2022-11-02 PROCEDURE — 84439 ASSAY OF FREE THYROXINE: CPT

## 2022-11-02 PROCEDURE — 36415 COLL VENOUS BLD VENIPUNCTURE: CPT

## 2022-11-02 PROCEDURE — 80053 COMPREHEN METABOLIC PANEL: CPT

## 2022-11-02 PROCEDURE — 99000 SPECIMEN HANDLING OFFICE-LAB: CPT

## 2022-11-02 PROCEDURE — 83036 HEMOGLOBIN GLYCOSYLATED A1C: CPT

## 2022-11-02 PROCEDURE — 84443 ASSAY THYROID STIM HORMONE: CPT

## 2022-11-06 LAB
CHOLEST SERPL-MCNC: 109 MG/DL
HDL SERPL QN: 8.4 NM
HDL SERPL-SCNC: 29.2 UMOL/L
HDLC SERPL-MCNC: 34 MG/DL
HLD.LARGE SERPL-SCNC: <2.8 UMOL/L
LDL SERPL QN: 20.4 NM
LDL SERPL-SCNC: 1003 NMOL/L
LDL SMALL SERPL-SCNC: 676 NMOL/L
LDLC SERPL CALC-MCNC: 44 MG/DL
PATHOLOGY STUDY: ABNORMAL
TRIGL SERPL-MCNC: 157 MG/DL
VLDL LARGE SERPL-SCNC: 3.6 NMOL/L
VLDL SERPL QN: 49.8 NM

## 2022-11-16 ENCOUNTER — OFFICE VISIT (OUTPATIENT)
Dept: OTHER | Facility: CLINIC | Age: 72
End: 2022-11-16
Attending: INTERNAL MEDICINE
Payer: COMMERCIAL

## 2022-11-16 VITALS
BODY MASS INDEX: 29.32 KG/M2 | OXYGEN SATURATION: 97 % | SYSTOLIC BLOOD PRESSURE: 127 MMHG | WEIGHT: 210.2 LBS | DIASTOLIC BLOOD PRESSURE: 83 MMHG | HEART RATE: 97 BPM

## 2022-11-16 DIAGNOSIS — I10 ESSENTIAL HYPERTENSION WITH GOAL BLOOD PRESSURE LESS THAN 130/80: ICD-10-CM

## 2022-11-16 DIAGNOSIS — E11.00 TYPE 2 DIABETES MELLITUS WITH HYPEROSMOLARITY WITHOUT COMA, WITHOUT LONG-TERM CURRENT USE OF INSULIN (H): ICD-10-CM

## 2022-11-16 DIAGNOSIS — E78.5 HYPERLIPIDEMIA LDL GOAL <70: ICD-10-CM

## 2022-11-16 DIAGNOSIS — F51.11 PRIMARY HYPERSOMNIA: ICD-10-CM

## 2022-11-16 DIAGNOSIS — E11.9 TYPE 2 DIABETES MELLITUS TREATED WITHOUT INSULIN (H): ICD-10-CM

## 2022-11-16 DIAGNOSIS — Z12.5 SPECIAL SCREENING FOR MALIGNANT NEOPLASM OF PROSTATE: ICD-10-CM

## 2022-11-16 DIAGNOSIS — Z00.00 MEDICARE ANNUAL WELLNESS VISIT, SUBSEQUENT: Primary | ICD-10-CM

## 2022-11-16 DIAGNOSIS — R33.9 URINARY RETENTION: ICD-10-CM

## 2022-11-16 PROCEDURE — G0463 HOSPITAL OUTPT CLINIC VISIT: HCPCS

## 2022-11-16 PROCEDURE — G0439 PPPS, SUBSEQ VISIT: HCPCS | Performed by: INTERNAL MEDICINE

## 2022-11-16 PROCEDURE — 99213 OFFICE O/P EST LOW 20 MIN: CPT | Mod: 25 | Performed by: INTERNAL MEDICINE

## 2022-11-16 RX ORDER — ZOLPIDEM TARTRATE 10 MG/1
TABLET ORAL
Qty: 10 TABLET | Refills: 5 | Status: SHIPPED | OUTPATIENT
Start: 2022-11-16 | End: 2024-02-25

## 2022-11-16 RX ORDER — LIRAGLUTIDE 6 MG/ML
1.8 INJECTION SUBCUTANEOUS DAILY
Qty: 27 ML | Refills: 3 | Status: SHIPPED | OUTPATIENT
Start: 2022-11-16 | End: 2022-11-21

## 2022-11-16 RX ORDER — ROSUVASTATIN CALCIUM 40 MG/1
40 TABLET, COATED ORAL DAILY
Qty: 90 TABLET | Refills: 3 | Status: SHIPPED | OUTPATIENT
Start: 2022-11-16 | End: 2023-11-24

## 2022-11-16 RX ORDER — GLIMEPIRIDE 2 MG/1
2 TABLET ORAL 2 TIMES DAILY
Qty: 180 TABLET | Refills: 2 | Status: CANCELLED | OUTPATIENT
Start: 2022-11-16

## 2022-11-16 RX ORDER — LISINOPRIL 20 MG/1
20 TABLET ORAL DAILY
Qty: 90 TABLET | Refills: 3 | Status: SHIPPED | OUTPATIENT
Start: 2022-11-16 | End: 2024-02-07

## 2022-11-16 RX ORDER — TAMSULOSIN HYDROCHLORIDE 0.4 MG/1
0.4 CAPSULE ORAL DAILY
Qty: 90 CAPSULE | Refills: 3 | Status: SHIPPED | OUTPATIENT
Start: 2022-11-16 | End: 2023-03-08 | Stop reason: ALTCHOICE

## 2022-11-16 NOTE — PROGRESS NOTES
[facility]        Patient is here for a  follow up.     Pt is currently taking Statin.    /83 (BP Location: Right arm, Patient Position: Chair, Cuff Size: Adult Regular)   Pulse 97   Wt 210 lb 3.2 oz (95.3 kg)   SpO2 97%   BMI 29.32 kg/m      The provider has been notified that the patient has no concerns.     Questions patient would like addressed today are: N/A.    Refills are needed: N/A    Has homecare services and agency name:  Kiera Dooley MA

## 2022-11-16 NOTE — PROGRESS NOTES
Dino Daniels is a 72 year old male who presents for       Medicare annual wellness visit, subsequent  Hyperlipidemia LDL goal <70  Essential hypertension with goal blood pressure less than 130/80  Type 2 diabetes mellitus with hyperosmolarity without coma, without long-term current use of insulin (H)  Urinary retention  Type 2 diabetes mellitus treated without insulin (H)  Primary hypersomnia  Special screening for malignant neoplasm of prostate        HPI: Dino Daniels is a 72 year old male with htn, HLD, DM2, obesity here for an annual exam as well as lab and med rview. His A1C has gone up. He has no hypoglycemic episodes, he has no h/o UTIs.      Review Of Systems  Skin: negative  Eyes: negative  Ears/Nose/Throat: negative  Respiratory: No shortness of breath, dyspnea on exertion, cough, or hemoptysis  Cardiovascular: negative  Gastrointestinal: negative  Genitourinary: negative  Musculoskeletal: negative  Neurologic: negative  Psychiatric: negative  Hematologic/Lymphatic/Immunologic: negative  Endocrine: negative      Current providers caring for this patient include:  Patient Care Team:  Abel Fernandez MD as PCP - General  Abel Fernandez MD as Assigned Heart and Vascular Provider    Complete Medical and Social history reviewed with patient, outlined below.    Patient Active Problem List   Diagnosis     Pain in joint, ankle and foot     HYPERLIPIDEMIA LDL GOAL <100     Type 2 diabetes, HbA1c goal < 7% (H)     Major depressive disorder, single episode in full remission (H)       Past Medical History:   Diagnosis Date     Arthritis     knees, left ankle and right thumb     Bladder stone      DEPRESSIVE DISORDER NEC 10/9/2006    resolved in 2009     Hypertension      Mumps      Other and unspecified hyperlipidemia     abstracted 7/17/02.     Type II or unspecified type diabetes mellitus without mention of complication, not stated as uncontrolled        Past Surgical History:    Procedure Laterality Date     LASER HOLMIUM LITHOTRIPSY BLADDER  2012    Procedure: LASER HOLMIUM LITHOTRIPSY BLADDER;  Cystoscopy with Removal of Bladder Stone with Holmium Laser;  Surgeon: Chaz Cobb MD;  Location: [facility] OR     TESTICLE SURGERY       VASECTOMY       VASECTOMY       wisdom teeth[         Family History   Problem Relation Age of Onset      () Father          age 56 leukemia     Cerebrovascular Disease Mother       () Brother         b,1952      () Sister         b,1954 back problems      () Brother         b,1955 back problems       Social History     Tobacco Use     Smoking status: Never     Smokeless tobacco: Never   Substance Use Topics     Alcohol use: Yes     Alcohol/week: 0.0 standard drinks     Comment: rarely       Diet: diabetic  Physical Activity: generally inactive  Depression Screen:    Over the past 2 weeks, patient has felt down, depressed, or hopeless:  No    Over the past 2 weeks, patient has felt little interest or pleasure in doing things: No    Functional ability/Safety screen:  Up and go test (able to get up and walk longer than 30 seconds): Passed  Patient needs assistance with: nothing  Patient's home has the following possible safety concerns: none identified  Patient has concerns about his hearing:  Yes  Cognitive Screen  Patient repeats three objects (ball, flag, tree)      Clock drawing test:   NORMAL  Recalls three objects after 3 minutes (ball,flag,tree):                                                                                               recalls 3 objects (3 points)    Physical Exam:  /83 (BP Location: Right arm, Patient Position: Chair, Cuff Size: Adult Regular)   Pulse 97   Wt 210 lb 3.2 oz (95.3 kg)   SpO2 97%   BMI 29.32 kg/m     Body mass index is 29.32 kg/m .                  GENERAL APPEARANCE: healthy, alert and no distress  PSYCH: Affect normal/bright.  Mentation within normal limits.  EYES: conjunctiva clear  HENT:  ear canals and TM's normal.  Nose and mouth without ulcers, erythema or lesions  NECK: supple, nontender, no lymphadenopathy  RESP: lungs clear to auscultation - no rales, rhonchi or wheezes  CV: regular rates and rhythm, normal S1 S2, no murmur noted  ABDOMEN:  soft, nontender, no HSM or masses and bowel sounds normal  SKIN: no suspicious lesions or rashes  NEURO: Normal strength and tone,  normal speech and mentation  Extremities: no peripheral edema or tenderness, peripheral pulses normal  MS: extremities normal- no gross deformities noted, no erythema, FROM noted in all extremities  PSYCH: mentation appears normal  LYMPHATICS: no cervical adenopathy    End of Life Planning:   Patient currently has an advanced directive: Yes.  Practitioner is supportive of decision.    Education/Counseling:   Based on review of the above information, the following items were addressed:      Diabetes -  access to diabetes self-management training, medical nutrition therapy, and treatment    Appropriate preventive services were discussed with this patient, including applicable screening as appropriate for cardiovascular disease, diabetes, osteopenia/osteoporosis, and glaucoma.  As appropriate for age/gender, discussed screening for colorectal cancer, prostate cancer, breast cancer, and cervical cancer.   Checklist reviewing preventive services available has been given to the patient.                    Component      Latest Ref Rng & Units 2/16/2022 4/27/2022 11/2/2022   WBC      4.0 - 11.0 10e3/uL 6.1 6.7    RBC Count      4.40 - 5.90 10e6/uL 4.74 5.04    Hemoglobin      13.3 - 17.7 g/dL 14.1 15.1    Hematocrit      40.0 - 53.0 % 42.1 44.7    MCV      78 - 100 fL 89 89    MCH      26.5 - 33.0 pg 29.7 30.0    MCHC      31.5 - 36.5 g/dL 33.5 33.8    RDW      10.0 - 15.0 % 14.0 14.0    Platelet Count      150 - 450 10e3/uL 158 190    % Neutrophils      % 53 53    % Lymphocytes      % 35 38    % Monocytes      % 8 8    % Eosinophils       % 3 2    % Basophils      % 1 0    Absolute Neutrophils      1.6 - 8.3 10e3/uL 3.2 3.5    Absolute Lymphocytes      0.8 - 5.3 10e3/uL 2.1 2.5    Absolute Monocytes      0.0 - 1.3 10e3/uL 0.5 0.5    Absolute Eosinophils      0.0 - 0.7 10e3/uL 0.2 0.1    Absolute Basophils      0.0 - 0.2 10e3/uL 0.0 0.0    Sodium      133 - 144 mmol/L 139 137 137   Potassium      3.4 - 5.3 mmol/L 4.3 4.8 4.8   Chloride      94 - 109 mmol/L 108 106 106   Carbon Dioxide      20 - 32 mmol/L 23 25 26   Anion Gap      3 - 14 mmol/L 8 6 5   Urea Nitrogen      7 - 30 mg/dL 19 16 20   Creatinine      0.66 - 1.25 mg/dL 0.94 0.93 0.83   Calcium      8.5 - 10.1 mg/dL 9.1 10.0 9.8   Glucose      70 - 99 mg/dL 205 (H) 116 (H) 224 (H)   Alkaline Phosphatase      40 - 150 U/L 32 (L)  39 (L)   AST      0 - 45 U/L 17  22   ALT      0 - 70 U/L 37  40   Protein Total      6.8 - 8.8 g/dL 7.2  7.6   Albumin      3.4 - 5.0 g/dL 3.8  4.1   Bilirubin Total      0.2 - 1.3 mg/dL 0.5  0.5   GFR Estimate      >60 mL/min/1.73m2 87 88 >90   Total Cholesterol      <=199 mg/dL  100 109   Triglycerides      30 - 149 mg/dL  121 157 (H)   HDL Cholesterol      40 - 59 mg/dL  35 (L) 34 (L)   LDL Cholesterol      <=129 mg/dL  41 44   HDL Size      >=8.9 nm  8.3 (L) 8.4 (L)   VLDL Size      <=46.7 nm  47.9 (H) 49.8 (H)   LDL Particle Size      >=20.7 nm  20.5 (L) 20.4 (L)   Lge HDL Particle number      >=4.2 umol/L  <2.8 (L) <2.8 (L)   HDL Particle Number NMR      >=33.0 umol/L  28.8 (L) 29.2 (L)   Lge VLDL Part number      <=2.7 nmol/L  1.9 3.6 (H)   Small LDL Particle number      <=634 nmol/L  646 (H) 676 (H)   LDL Particle Number      <=1135 nmol/L  919 1003   EER LipoFit by NMR        See Note See Note   Creatinine Urine      mg/dL 166     Albumin Urine mg/L      mg/L 10     Albumin Urine mg/g Cr      0.00 - 17.00 mg/g Cr 6.02     TSH      0.40 - 4.00 mU/L 2.12 1.39 2.21   Free T3      2.0 - 4.4 pg/mL 2.7 2.6 2.9   T4 Free      0.76 - 1.46 ng/dL 0.86 0.93 0.90    Hemoglobin A1C      0.0 - 5.6 % 8.3 (H) 6.9 (H) 8.3 (H)   Lipoprotein (a)      <=29 mg/dL  20    C-Reactive Protein High Sensitivity      mg/L  1.2    Lipoprotein (a)      <30 mg/dL   16         A/P:    (Z00.00) Medicare annual wellness visit, subsequent  (primary encounter diagnosis)  Comment: Doing well, RTC one year for annual exam, colonosocpy per GI  Plan: CBC with Platelets & Differential,         Comprehensive metabolic panel           (E78.5) Hyperlipidemia LDL goal <70  Comment: at goal  Plan: rosuvastatin (CRESTOR) 40 MG tablet,         Lipoprotein (a), LipoFit by NMR, T3 Free, T4         free, TSH           (I10) Essential hypertension with goal blood pressure less than 130/80  Comment: at goal  Plan: lisinopril (ZESTRIL) 20 MG tablet           (E11.00) Type 2 diabetes mellitus with A1C goal < 7 %  Comment: not at goal. No UTI hx, add Jardiance. RTC three months for labs, see me two weeks later. He is told to stop Jardiance for any UTI sxs.  Plan: liraglutide (VICTOZA) 18 MG/3ML solution,         metFORMIN (GLUCOPHAGE) 1000 MG tablet,         Comprehensive metabolic panel, Albumin Random         Urine Quantitative with Creat Ratio, C-Reactive        Protein, High Sensitivity, Hemoglobin A1c,         empagliflozin (JARDIANCE) 25 MG TABS tablet           (R33.9) Urinary retention  Comment: needs the below  Plan: tamsulosin (FLOMAX) 0.4 MG capsule          (E11.9) Type 2 diabetes mellitus treated without insulin (H)  Comment: as above  Plan: aspirin (ASA) 81 MG EC tablet          (F51.11) Primary hypersomnia  Comment: needs the below intermittently  Plan: zolpidem (AMBIEN) 10 MG tablet          (Z12.5) Special screening for malignant neoplasm of prostate  Comment: check the below  Plan: Prostate Specific Antigen Screen           20 minutes not spent on preventive care during this visit was spent providing medical care regarding item(s) # 2 onward as delineated above.

## 2022-11-18 ENCOUNTER — TELEPHONE (OUTPATIENT)
Dept: OTHER | Facility: CLINIC | Age: 72
End: 2022-11-18

## 2022-11-18 DIAGNOSIS — E11.00 TYPE 2 DIABETES MELLITUS WITH HYPEROSMOLARITY WITHOUT COMA, WITHOUT LONG-TERM CURRENT USE OF INSULIN (H): ICD-10-CM

## 2022-11-18 NOTE — TELEPHONE ENCOUNTER
[facility]    Who is the name of the provider?:  Rebecca    What is the location you see this provider at/preferred location?: [place]  Person calling: Rosa with [employer]  Phone number:  [phone]  Nurse call back needed:  YES     Reason for call:   [employer] is requesting clarification on directions for liraglutide (VICTOZA)     Please call them back. Their invoice reference number is [id number]    Pharmacy location:  n/a  Outside Imaging: Not Applicable   Can we leave a detailed message on this number?  YES

## 2022-11-21 RX ORDER — LIRAGLUTIDE 6 MG/ML
1.8 INJECTION SUBCUTANEOUS DAILY
Qty: 27 ML | Refills: 3 | Status: SHIPPED | OUTPATIENT
Start: 2022-11-21 | End: 2024-01-15

## 2022-11-21 NOTE — TELEPHONE ENCOUNTER
Rx instructions corrected and sent electronically.    Lorna Huffman RN BSN  [facility]  [phone]

## 2022-11-21 NOTE — TELEPHONE ENCOUNTER
Rayna from [employer] called regarding the below message. Rayna was not aware that Rosa had already contacted us and wanted us to forward it on.

## 2023-01-02 ENCOUNTER — TRANSFERRED RECORDS (OUTPATIENT)
Dept: HEALTH INFORMATION MANAGEMENT | Facility: CLINIC | Age: 73
End: 2023-01-02

## 2023-02-22 ENCOUNTER — LAB (OUTPATIENT)
Dept: LAB | Facility: CLINIC | Age: 73
End: 2023-02-22
Payer: COMMERCIAL

## 2023-02-22 DIAGNOSIS — E78.5 HYPERLIPIDEMIA LDL GOAL <70: ICD-10-CM

## 2023-02-22 DIAGNOSIS — Z12.5 SPECIAL SCREENING FOR MALIGNANT NEOPLASM OF PROSTATE: ICD-10-CM

## 2023-02-22 DIAGNOSIS — E11.00 TYPE 2 DIABETES MELLITUS WITH HYPEROSMOLARITY WITHOUT COMA, WITHOUT LONG-TERM CURRENT USE OF INSULIN (H): ICD-10-CM

## 2023-02-22 DIAGNOSIS — Z00.00 MEDICARE ANNUAL WELLNESS VISIT, SUBSEQUENT: ICD-10-CM

## 2023-02-22 LAB
ALBUMIN SERPL BCG-MCNC: 4.7 G/DL (ref 3.5–5.2)
ALP SERPL-CCNC: 30 U/L (ref 40–129)
ALT SERPL W P-5'-P-CCNC: 33 U/L (ref 10–50)
ANION GAP SERPL CALCULATED.3IONS-SCNC: 12 MMOL/L (ref 7–15)
APO A-I SERPL-MCNC: 14 MG/DL
AST SERPL W P-5'-P-CCNC: 25 U/L (ref 10–50)
BASOPHILS # BLD AUTO: 0 10E3/UL (ref 0–0.2)
BASOPHILS NFR BLD AUTO: 1 %
BILIRUB SERPL-MCNC: 0.4 MG/DL
BUN SERPL-MCNC: 19.6 MG/DL (ref 8–23)
CALCIUM SERPL-MCNC: 9.9 MG/DL (ref 8.8–10.2)
CHLORIDE SERPL-SCNC: 103 MMOL/L (ref 98–107)
CREAT SERPL-MCNC: 0.92 MG/DL (ref 0.67–1.17)
CREAT UR-MCNC: 117 MG/DL
CRP SERPL HS-MCNC: <0.15 MG/L
DEPRECATED HCO3 PLAS-SCNC: 24 MMOL/L (ref 22–29)
EOSINOPHIL # BLD AUTO: 0.1 10E3/UL (ref 0–0.7)
EOSINOPHIL NFR BLD AUTO: 2 %
ERYTHROCYTE [DISTWIDTH] IN BLOOD BY AUTOMATED COUNT: 13.5 % (ref 10–15)
GFR SERPL CREATININE-BSD FRML MDRD: 88 ML/MIN/1.73M2
GLUCOSE SERPL-MCNC: 189 MG/DL (ref 70–99)
HBA1C MFR BLD: 7.6 % (ref 0–5.6)
HCT VFR BLD AUTO: 45.4 % (ref 40–53)
HGB BLD-MCNC: 15.1 G/DL (ref 13.3–17.7)
LYMPHOCYTES # BLD AUTO: 2.4 10E3/UL (ref 0.8–5.3)
LYMPHOCYTES NFR BLD AUTO: 39 %
MCH RBC QN AUTO: 29.4 PG (ref 26.5–33)
MCHC RBC AUTO-ENTMCNC: 33.3 G/DL (ref 31.5–36.5)
MCV RBC AUTO: 88 FL (ref 78–100)
MICROALBUMIN UR-MCNC: <12 MG/L
MICROALBUMIN/CREAT UR: NORMAL MG/G{CREAT}
MONOCYTES # BLD AUTO: 0.4 10E3/UL (ref 0–1.3)
MONOCYTES NFR BLD AUTO: 7 %
NEUTROPHILS # BLD AUTO: 3.2 10E3/UL (ref 1.6–8.3)
NEUTROPHILS NFR BLD AUTO: 52 %
PLATELET # BLD AUTO: 163 10E3/UL (ref 150–450)
POTASSIUM SERPL-SCNC: 4.8 MMOL/L (ref 3.4–5.3)
PROT SERPL-MCNC: 7.2 G/DL (ref 6.4–8.3)
PSA SERPL-MCNC: 2.58 NG/ML (ref 0–6.5)
RBC # BLD AUTO: 5.14 10E6/UL (ref 4.4–5.9)
SODIUM SERPL-SCNC: 139 MMOL/L (ref 136–145)
T3FREE SERPL-MCNC: 2.8 PG/ML (ref 2–4.4)
T4 FREE SERPL-MCNC: 1.18 NG/DL (ref 0.9–1.7)
TSH SERPL DL<=0.005 MIU/L-ACNC: 2.18 UIU/ML (ref 0.3–4.2)
WBC # BLD AUTO: 6.2 10E3/UL (ref 4–11)

## 2023-02-22 PROCEDURE — 84481 FREE ASSAY (FT-3): CPT

## 2023-02-22 PROCEDURE — 82570 ASSAY OF URINE CREATININE: CPT

## 2023-02-22 PROCEDURE — 82043 UR ALBUMIN QUANTITATIVE: CPT

## 2023-02-22 PROCEDURE — 2894A CBC WITH PLATELETS AND DIFFERENTIAL: CPT

## 2023-02-22 PROCEDURE — 84443 ASSAY THYROID STIM HORMONE: CPT

## 2023-02-22 PROCEDURE — 80053 COMPREHEN METABOLIC PANEL: CPT

## 2023-02-22 PROCEDURE — 83695 ASSAY OF LIPOPROTEIN(A): CPT

## 2023-02-22 PROCEDURE — 36415 COLL VENOUS BLD VENIPUNCTURE: CPT

## 2023-02-22 PROCEDURE — 86141 C-REACTIVE PROTEIN HS: CPT

## 2023-02-22 PROCEDURE — 85025 COMPLETE CBC W/AUTO DIFF WBC: CPT

## 2023-02-22 PROCEDURE — 83036 HEMOGLOBIN GLYCOSYLATED A1C: CPT

## 2023-02-22 PROCEDURE — G0103 PSA SCREENING: HCPCS

## 2023-02-22 PROCEDURE — 84439 ASSAY OF FREE THYROXINE: CPT

## 2023-02-27 DIAGNOSIS — E78.5 HYPERLIPIDEMIA LDL GOAL <70: Primary | ICD-10-CM

## 2023-03-03 ENCOUNTER — LAB (OUTPATIENT)
Dept: LAB | Facility: CLINIC | Age: 73
End: 2023-03-03
Payer: COMMERCIAL

## 2023-03-03 DIAGNOSIS — E78.5 HYPERLIPIDEMIA LDL GOAL <70: ICD-10-CM

## 2023-03-03 PROCEDURE — 83704 LIPOPROTEIN BLD QUAN PART: CPT | Mod: 90

## 2023-03-03 PROCEDURE — 99000 SPECIMEN HANDLING OFFICE-LAB: CPT

## 2023-03-03 PROCEDURE — 80061 LIPID PANEL: CPT | Mod: 59

## 2023-03-06 LAB
CHOLEST SERPL-MCNC: 100 MG/DL
HDL SERPL QN: 8.4 NM
HDL SERPL-SCNC: 27.9 UMOL/L
HDLC SERPL-MCNC: 32 MG/DL
HLD.LARGE SERPL-SCNC: <2.8 UMOL/L
LDL SERPL QN: 20.5 NM
LDL SERPL-SCNC: 938 NMOL/L
LDL SMALL SERPL-SCNC: 728 NMOL/L
LDLC SERPL CALC-MCNC: 46 MG/DL
PATHOLOGY STUDY: ABNORMAL
TRIGL SERPL-MCNC: 110 MG/DL
VLDL LARGE SERPL-SCNC: 2.1 NMOL/L
VLDL SERPL QN: 51.9 NM

## 2023-03-08 ENCOUNTER — OFFICE VISIT (OUTPATIENT)
Dept: OTHER | Facility: CLINIC | Age: 73
End: 2023-03-08
Attending: INTERNAL MEDICINE
Payer: COMMERCIAL

## 2023-03-08 VITALS
WEIGHT: 203.4 LBS | BODY MASS INDEX: 28.48 KG/M2 | HEIGHT: 71 IN | HEART RATE: 90 BPM | DIASTOLIC BLOOD PRESSURE: 84 MMHG | OXYGEN SATURATION: 96 % | SYSTOLIC BLOOD PRESSURE: 125 MMHG

## 2023-03-08 DIAGNOSIS — E78.5 HYPERLIPIDEMIA LDL GOAL <70: ICD-10-CM

## 2023-03-08 DIAGNOSIS — E11.00 TYPE 2 DIABETES MELLITUS WITH HYPEROSMOLARITY WITHOUT COMA, WITHOUT LONG-TERM CURRENT USE OF INSULIN (H): ICD-10-CM

## 2023-03-08 DIAGNOSIS — N52.01 ERECTILE DYSFUNCTION DUE TO ARTERIAL INSUFFICIENCY: Primary | ICD-10-CM

## 2023-03-08 DIAGNOSIS — I10 ESSENTIAL HYPERTENSION WITH GOAL BLOOD PRESSURE LESS THAN 130/80: ICD-10-CM

## 2023-03-08 PROCEDURE — G0463 HOSPITAL OUTPT CLINIC VISIT: HCPCS

## 2023-03-08 PROCEDURE — 99214 OFFICE O/P EST MOD 30 MIN: CPT | Performed by: INTERNAL MEDICINE

## 2023-03-08 RX ORDER — SILDENAFIL 100 MG/1
100 TABLET, FILM COATED ORAL DAILY PRN
Qty: 18 TABLET | Refills: 3 | Status: SHIPPED | OUTPATIENT
Start: 2023-03-08 | End: 2023-05-17

## 2023-03-08 NOTE — PROGRESS NOTES
Dino Daniels is a 72 year old male who presents for        Medicare annual wellness visit, subsequent  Hyperlipidemia LDL goal <70  Essential hypertension with goal blood pressure less than 130/80  Type 2 diabetes mellitus with hyperosmolarity without coma, without long-term current use of insulin (H)  Urinary retention  Type 2 diabetes mellitus treated without insulin (H)  Primary hypersomnia  Special screening for malignant neoplasm of prostate           HPI: Dino Daniels is a 72 year old male with htn, HLD, DM2, obesity here for an annual exam as well as lab and med rview. His A1C has gone up. He has no hypoglycemic episodes, he has no h/o UTIs.        Review Of Systems  Skin: negative  Eyes: negative  Ears/Nose/Throat: negative  Respiratory: No shortness of breath, dyspnea on exertion, cough, or hemoptysis  Cardiovascular: negative  Gastrointestinal: negative  Genitourinary: nocturia  Musculoskeletal: ankle pain post fusion  Neurologic: negative  Psychiatric: negative  Hematologic/Lymphatic/Immunologic: negative  Endocrine: negative        Current providers caring for this patient include:  Patient Care Team:  Abel Fernandez MD as PCP - General  Abel Fernandez MD as Assigned Heart and Vascular Provider     Complete Medical and Social history reviewed with patient, outlined below.         Patient Active Problem List   Diagnosis     Pain in joint, ankle and foot     HYPERLIPIDEMIA LDL GOAL <100     Type 2 diabetes, HbA1c goal < 7% (H)     Major depressive disorder, single episode in full remission (H)         Past Medical History        Past Medical History:   Diagnosis Date     Arthritis       knees, left ankle and right thumb     Bladder stone       DEPRESSIVE DISORDER NEC 10/9/2006     resolved in 2009     Hypertension       Mumps       Other and unspecified hyperlipidemia       abstracted 7/17/02.     Type II or unspecified type diabetes mellitus without mention of complication,  not stated as uncontrolled              Past Surgical History         Past Surgical History:   Procedure Laterality Date     LASER HOLMIUM LITHOTRIPSY BLADDER   2012     Procedure: LASER HOLMIUM LITHOTRIPSY BLADDER;  Cystoscopy with Removal of Bladder Stone with Holmium Laser;  Surgeon: Chaz Cobb MD;  Location: [facility] OR     TESTICLE SURGERY         VASECTOMY         VASECTOMY         wisdom teeth[                Family History         Family History   Problem Relation Age of Onset      () Father            age 56 leukemia     Cerebrovascular Disease Mother        () Brother           b,      () Sister           b, back problems      () Brother           b, back problems            Social History            Tobacco Use     Smoking status: Never     Smokeless tobacco: Never   Substance Use Topics     Alcohol use: Yes       Alcohol/week: 0.0 standard drinks       Comment: rarely         Diet: diabetic  Physical Activity: generally inactive  Depression Screen:    Over the past 2 weeks, patient has felt down, depressed, or hopeless:  No    Over the past 2 weeks, patient has felt little interest or pleasure in doing things: No     Functional ability/Safety screen:  Up and go test (able to get up and walk longer than 30 seconds): Passed  Patient needs assistance with: nothing  Patient's home has the following possible safety concerns: none identified  Patient has concerns about his hearing:  Yes  Cognitive Screen  Patient repeats three objects (ball, flag, tree)      Clock drawing test:   NORMAL  Recalls three objects after 3 minutes (ball,flag,tree):                                                                                               recalls 3 objects (3 points)     Physical Exam:  /83 (BP Location: Right arm, Patient Position: Chair, Cuff Size: Adult Regular)   Pulse 97   Wt 210 lb 3.2 oz (95.3 kg)   SpO2 97%   BMI 29.32 kg/m     Body mass index is 29.32 kg/m .                         GENERAL APPEARANCE: healthy, alert and no distress  PSYCH: Affect normal/bright.  Mentation within normal limits.  EYES: conjunctiva clear  HENT: ear canals and TM's normal.  Nose and mouth without ulcers, erythema or lesions  NECK: supple, nontender, no lymphadenopathy  RESP: lungs clear to auscultation - no rales, rhonchi or wheezes  CV: regular rates and rhythm, normal S1 S2, no murmur noted  ABDOMEN:  soft, nontender, no HSM or masses and bowel sounds normal  SKIN: no suspicious lesions or rashes  NEURO: Normal strength and tone,  normal speech and mentation  Extremities: no peripheral edema or tenderness, peripheral pulses normal  MS: extremities normal- no gross deformities noted, no erythema, FROM noted in all extremities  PSYCH: mentation appears normal  LYMPHATICS: no cervical adenopathy     End of Life Planning:   Patient currently has an advanced directive: Yes.  Practitioner is supportive of decision.     Education/Counseling:   Based on review of the above information, the following items were addressed:      Diabetes -  access to diabetes self-management training, medical nutrition therapy, and treatment     Appropriate preventive services were discussed with this patient, including applicable screening as appropriate for cardiovascular disease, diabetes, osteopenia/osteoporosis, and glaucoma.  As appropriate for age/gender, discussed screening for colorectal cancer, prostate cancer, breast cancer, and cervical cancer.   Checklist reviewing preventive services available has been given to the patient.                             Component      Latest Ref Rng & Units 2/16/2022 4/27/2022 11/2/2022   WBC      4.0 - 11.0 10e3/uL 6.1 6.7     RBC Count      4.40 - 5.90 10e6/uL 4.74 5.04     Hemoglobin      13.3 - 17.7 g/dL 14.1 15.1     Hematocrit      40.0 - 53.0 % 42.1 44.7     MCV      78 - 100 fL 89 89     MCH      26.5 - 33.0 pg 29.7 30.0     MCHC      31.5 - 36.5 g/dL 33.5 33.8      RDW      10.0 - 15.0 % 14.0 14.0     Platelet Count      150 - 450 10e3/uL 158 190     % Neutrophils      % 53 53     % Lymphocytes      % 35 38     % Monocytes      % 8 8     % Eosinophils      % 3 2     % Basophils      % 1 0     Absolute Neutrophils      1.6 - 8.3 10e3/uL 3.2 3.5     Absolute Lymphocytes      0.8 - 5.3 10e3/uL 2.1 2.5     Absolute Monocytes      0.0 - 1.3 10e3/uL 0.5 0.5     Absolute Eosinophils      0.0 - 0.7 10e3/uL 0.2 0.1     Absolute Basophils      0.0 - 0.2 10e3/uL 0.0 0.0     Sodium      133 - 144 mmol/L 139 137 137   Potassium      3.4 - 5.3 mmol/L 4.3 4.8 4.8   Chloride      94 - 109 mmol/L 108 106 106   Carbon Dioxide      20 - 32 mmol/L 23 25 26   Anion Gap      3 - 14 mmol/L 8 6 5   Urea Nitrogen      7 - 30 mg/dL 19 16 20   Creatinine      0.66 - 1.25 mg/dL 0.94 0.93 0.83   Calcium      8.5 - 10.1 mg/dL 9.1 10.0 9.8   Glucose      70 - 99 mg/dL 205 (H) 116 (H) 224 (H)   Alkaline Phosphatase      40 - 150 U/L 32 (L)   39 (L)   AST      0 - 45 U/L 17   22   ALT      0 - 70 U/L 37   40   Protein Total      6.8 - 8.8 g/dL 7.2   7.6   Albumin      3.4 - 5.0 g/dL 3.8   4.1   Bilirubin Total      0.2 - 1.3 mg/dL 0.5   0.5   GFR Estimate      >60 mL/min/1.73m2 87 88 >90   Total Cholesterol      <=199 mg/dL   100 109   Triglycerides      30 - 149 mg/dL   121 157 (H)   HDL Cholesterol      40 - 59 mg/dL   35 (L) 34 (L)   LDL Cholesterol      <=129 mg/dL   41 44   HDL Size      >=8.9 nm   8.3 (L) 8.4 (L)   VLDL Size      <=46.7 nm   47.9 (H) 49.8 (H)   LDL Particle Size      >=20.7 nm   20.5 (L) 20.4 (L)   Lge HDL Particle number      >=4.2 umol/L   <2.8 (L) <2.8 (L)   HDL Particle Number NMR      >=33.0 umol/L   28.8 (L) 29.2 (L)   Lge VLDL Part number      <=2.7 nmol/L   1.9 3.6 (H)   Small LDL Particle number      <=634 nmol/L   646 (H) 676 (H)   LDL Particle Number      <=1135 nmol/L   919 1003   EER LipoFit by NMR         See Note See Note   Creatinine Urine      mg/dL 166       Albumin  Urine mg/L      mg/L 10       Albumin Urine mg/g Cr      0.00 - 17.00 mg/g Cr 6.02       TSH      0.40 - 4.00 mU/L 2.12 1.39 2.21   Free T3      2.0 - 4.4 pg/mL 2.7 2.6 2.9   T4 Free      0.76 - 1.46 ng/dL 0.86 0.93 0.90   Hemoglobin A1C      0.0 - 5.6 % 8.3 (H) 6.9 (H) 8.3 (H)   Lipoprotein (a)      <=29 mg/dL   20     C-Reactive Protein High Sensitivity      mg/L   1.2     Lipoprotein (a)      <30 mg/dL     16         Component      Latest Ref Rng & Units 2/22/2023 3/3/2023   Urea Nitrogen      8.0 - 23.0 mg/dL 19.6    Creatinine      0.67 - 1.17 mg/dL 0.92    Calcium      8.8 - 10.2 mg/dL 9.9    Glucose      70 - 99 mg/dL 189 (H)    Alkaline Phosphatase      40 - 129 U/L 30 (L)    AST      10 - 50 U/L 25    ALT      10 - 50 U/L 33    Protein Total      6.4 - 8.3 g/dL 7.2    Albumin      3.5 - 5.2 g/dL 4.7    Bilirubin Total      <=1.2 mg/dL 0.4    GFR Estimate      >60 mL/min/1.73m2 88    Total Cholesterol      <=199 mg/dL  100   Triglycerides      30 - 149 mg/dL  110   HDL Cholesterol      40 - 59 mg/dL  32 (L)   LDL Cholesterol      <=129 mg/dL  46   HDL Size      >=8.9 nm  8.4 (L)   VLDL Size      <=46.7 nm  51.9 (H)   LDL Particle Size      >=20.7 nm  20.5 (L)   Lge HDL Particle number      >=4.2 umol/L  <2.8 (L)   HDL Particle Number NMR      >=33.0 umol/L  27.9 (L)   Lge VLDL Part number      <=2.7 nmol/L  2.1   Small LDL Particle number      <=634 nmol/L  728 (H)   LDL Particle Number      <=1135 nmol/L  938   EER LipoFit by NMR        See Note   Creatinine Urine      mg/dL 117.0    Albumin Urine mg/L      mg/L <12.0    Albumin Urine mg/g Cr           PSA Tumor Marker      0.00 - 6.50 ng/mL 2.58    C-Reactive Protein High Sensitivity       <0.15    Hemoglobin A1C      0.0 - 5.6 % 7.6 (H)    Lipoprotein (a)      <30 mg/dL 14    Free T3      2.0 - 4.4 pg/mL 2.8    T4 Free      0.90 - 1.70 ng/dL 1.18    TSH      0.30 - 4.20 uIU/mL 2.18         A/P:                 (E78.5) Hyperlipidemia LDL goal <70  Comment:  at goal  Plan: rosuvastatin (CRESTOR) 40 MG tablet,         Lipoprotein (a), LipoFit by NMR, T3 Free, T4         free, TSH            (I10) Essential hypertension with goal blood pressure less than 130/80  Comment: at goal  Plan: lisinopril (ZESTRIL) 20 MG tablet            (E11.00) Type 2 diabetes mellitus with A1C goal < 7 %  Comment: not at goal but improved. A1C improved form 8.3 to 7.6. He ran out of Jardiance for three weeks prior to getting A1C done, which is why he is A1C . No UTI hx, add Jardiance. RTC three months for labs, see me two weeks later. He is told to stop Jardiance for any UTI sxs.  Plan: liraglutide (VICTOZA) 18 MG/3ML solution,         metFORMIN (GLUCOPHAGE) 1000 MG tablet,         Comprehensive metabolic panel, Albumin Random         Urine Quantitative with Creat Ratio, C-Reactive        Protein, High Sensitivity, Hemoglobin A1c,         empagliflozin (JARDIANCE) 25 MG TABS tablet            (R33.9) Urinary retention  Comment: Stop Flomax and finasteride as he will be starting ViagraPlan: tamsulosin (FLOMAX) 0.4 MG capsule                        (Z12.5) Special screening for malignant neoplasm of prostate  Comment: PSA normal, check the below in one year  Plan: Prostate Specific Antigen Screen      (N52.01) Erectile dysfunction due to arterial insufficiency  (primary encounter diagnosis)  Comment: stop Flomax, finasteride, use Viagra, see his  MD should he have difficulty with nocturia not on the above meds.  Plan: sildenafil (VIAGRA) 100 MG tablet                       He is advised to discuss nocturia with his urologist , as well as issues pertaining to low testosterone.    He is advised to see his ortho MD who fused his ankle about ankle limitations.     32 minutes total medical car jocelyn today's date.

## 2023-03-08 NOTE — PROGRESS NOTES
"Patient is here to discuss follow up    /84 (BP Location: Right arm, Patient Position: Chair, Cuff Size: Adult Large)   Pulse 90   Ht 5' 11\" (1.803 m)   Wt 203 lb 6.4 oz (92.3 kg)   SpO2 96%   BMI 28.37 kg/m      Questions patient would like addressed today are: N/A.    Refills are needed: No    Has homecare services and agency name:  [facility] AVILE[facility]    "

## 2023-03-16 ENCOUNTER — MYC MEDICAL ADVICE (OUTPATIENT)
Dept: OTHER | Facility: CLINIC | Age: 73
End: 2023-03-16
Payer: COMMERCIAL

## 2023-03-16 DIAGNOSIS — Z12.11 ENCOUNTER FOR SCREENING COLONOSCOPY: Primary | ICD-10-CM

## 2023-03-17 NOTE — TELEPHONE ENCOUNTER
Please see patients last coloscopy in procedures tab from [facility] in 2019 and advise if patient is due for colonoscopy.     Nancy BORJAS, RN    [facility]  Office: [phone]  Fax: [phone]

## 2023-05-15 DIAGNOSIS — R33.9 URINARY RETENTION: ICD-10-CM

## 2023-05-15 RX ORDER — FINASTERIDE 5 MG/1
TABLET, FILM COATED ORAL
Qty: 90 TABLET | Refills: 3 | OUTPATIENT
Start: 2023-05-15

## 2023-05-17 ENCOUNTER — MYC MEDICAL ADVICE (OUTPATIENT)
Dept: OTHER | Facility: CLINIC | Age: 73
End: 2023-05-17
Payer: COMMERCIAL

## 2023-05-17 DIAGNOSIS — R33.9 URINARY RETENTION: Primary | ICD-10-CM

## 2023-05-17 RX ORDER — TAMSULOSIN HYDROCHLORIDE 0.4 MG/1
0.4 CAPSULE ORAL DAILY
Qty: 90 CAPSULE | Refills: 1 | Status: SHIPPED | OUTPATIENT
Start: 2023-05-17

## 2023-05-17 RX ORDER — FINASTERIDE 5 MG/1
5 TABLET, FILM COATED ORAL DAILY
Qty: 90 TABLET | Refills: 1 | Status: SHIPPED | OUTPATIENT
Start: 2023-05-17 | End: 2023-11-09

## 2023-05-17 NOTE — TELEPHONE ENCOUNTER
Please refill his requested meds. I can not seem to find the old Rxs in the format of this type of encounter.    Thank you,     CF

## 2023-05-19 ENCOUNTER — DOCUMENTATION ONLY (OUTPATIENT)
Dept: LAB | Facility: CLINIC | Age: 73
End: 2023-05-19
Payer: COMMERCIAL

## 2023-05-19 DIAGNOSIS — E11.00 TYPE 2 DIABETES MELLITUS WITH HYPEROSMOLARITY WITHOUT COMA, WITHOUT LONG-TERM CURRENT USE OF INSULIN (H): ICD-10-CM

## 2023-05-19 DIAGNOSIS — I10 ESSENTIAL HYPERTENSION WITH GOAL BLOOD PRESSURE LESS THAN 130/80: ICD-10-CM

## 2023-05-19 DIAGNOSIS — E11.9 TYPE 2 DIABETES MELLITUS TREATED WITHOUT INSULIN (H): Primary | ICD-10-CM

## 2023-05-19 DIAGNOSIS — E78.5 HYPERLIPIDEMIA LDL GOAL <70: ICD-10-CM

## 2023-05-19 NOTE — PROGRESS NOTES
Dino BASS Rejiariel has an upcoming lab appointment:    Future Appointments   Date Time Provider Department Center   6/6/2023  7:30 AM [facility]   6/21/2023  1:10 PM Abel Fernandez MD [facility]     Patient is scheduled for the following lab(s): per Dr. Fernandez    There is no order available. Please review and place either future orders or HMPO (Review of Health Maintenance Protocol Orders), as appropriate.    Health Maintenance Due   Topic     ANNUAL REVIEW OF HM ORDERS      LIPID      Terrell Larose, CMA, MLT

## 2023-06-06 ENCOUNTER — LAB (OUTPATIENT)
Dept: LAB | Facility: CLINIC | Age: 73
End: 2023-06-06
Payer: COMMERCIAL

## 2023-06-06 DIAGNOSIS — E11.9 TYPE 2 DIABETES MELLITUS TREATED WITHOUT INSULIN (H): ICD-10-CM

## 2023-06-06 DIAGNOSIS — I10 ESSENTIAL HYPERTENSION WITH GOAL BLOOD PRESSURE LESS THAN 130/80: ICD-10-CM

## 2023-06-06 DIAGNOSIS — E11.00 TYPE 2 DIABETES MELLITUS WITH HYPEROSMOLARITY WITHOUT COMA, WITHOUT LONG-TERM CURRENT USE OF INSULIN (H): ICD-10-CM

## 2023-06-06 DIAGNOSIS — E11.9 TYPE 2 DIABETES, HBA1C GOAL < 7% (H): ICD-10-CM

## 2023-06-06 DIAGNOSIS — E78.5 HYPERLIPIDEMIA LDL GOAL <70: Primary | ICD-10-CM

## 2023-06-06 LAB
ALBUMIN SERPL BCG-MCNC: 4.4 G/DL (ref 3.5–5.2)
ALP SERPL-CCNC: 33 U/L (ref 40–129)
ALT SERPL W P-5'-P-CCNC: 20 U/L (ref 10–50)
ANION GAP SERPL CALCULATED.3IONS-SCNC: 12 MMOL/L (ref 7–15)
APO A-I SERPL-MCNC: 17 MG/DL
AST SERPL W P-5'-P-CCNC: 21 U/L (ref 10–50)
BASOPHILS # BLD AUTO: 0 10E3/UL (ref 0–0.2)
BASOPHILS NFR BLD AUTO: 1 %
BILIRUB SERPL-MCNC: 0.5 MG/DL
BUN SERPL-MCNC: 19.3 MG/DL (ref 8–23)
CALCIUM SERPL-MCNC: 9.6 MG/DL (ref 8.8–10.2)
CHLORIDE SERPL-SCNC: 105 MMOL/L (ref 98–107)
CREAT SERPL-MCNC: 1.03 MG/DL (ref 0.67–1.17)
CREAT UR-MCNC: 68.9 MG/DL
CRP SERPL HS-MCNC: 0.22 MG/L
DEPRECATED HCO3 PLAS-SCNC: 23 MMOL/L (ref 22–29)
EOSINOPHIL # BLD AUTO: 0.1 10E3/UL (ref 0–0.7)
EOSINOPHIL NFR BLD AUTO: 2 %
ERYTHROCYTE [DISTWIDTH] IN BLOOD BY AUTOMATED COUNT: 13.8 % (ref 10–15)
GFR SERPL CREATININE-BSD FRML MDRD: 77 ML/MIN/1.73M2
GLUCOSE SERPL-MCNC: 165 MG/DL (ref 70–99)
HBA1C MFR BLD: 7.7 % (ref 0–5.6)
HCT VFR BLD AUTO: 42.4 % (ref 40–53)
HGB BLD-MCNC: 14.4 G/DL (ref 13.3–17.7)
IMM GRANULOCYTES # BLD: 0 10E3/UL
IMM GRANULOCYTES NFR BLD: 0 %
LYMPHOCYTES # BLD AUTO: 2 10E3/UL (ref 0.8–5.3)
LYMPHOCYTES NFR BLD AUTO: 37 %
MCH RBC QN AUTO: 29.6 PG (ref 26.5–33)
MCHC RBC AUTO-ENTMCNC: 34 G/DL (ref 31.5–36.5)
MCV RBC AUTO: 87 FL (ref 78–100)
MICROALBUMIN UR-MCNC: <12 MG/L
MICROALBUMIN/CREAT UR: NORMAL MG/G{CREAT}
MONOCYTES # BLD AUTO: 0.4 10E3/UL (ref 0–1.3)
MONOCYTES NFR BLD AUTO: 7 %
NEUTROPHILS # BLD AUTO: 2.9 10E3/UL (ref 1.6–8.3)
NEUTROPHILS NFR BLD AUTO: 53 %
PLATELET # BLD AUTO: 170 10E3/UL (ref 150–450)
POTASSIUM SERPL-SCNC: 4.6 MMOL/L (ref 3.4–5.3)
PROT SERPL-MCNC: 7 G/DL (ref 6.4–8.3)
RBC # BLD AUTO: 4.86 10E6/UL (ref 4.4–5.9)
SODIUM SERPL-SCNC: 140 MMOL/L (ref 136–145)
T3FREE SERPL-MCNC: 2.7 PG/ML (ref 2–4.4)
T4 FREE SERPL-MCNC: 1.11 NG/DL (ref 0.9–1.7)
TSH SERPL DL<=0.005 MIU/L-ACNC: 2.06 UIU/ML (ref 0.3–4.2)
WBC # BLD AUTO: 5.4 10E3/UL (ref 4–11)

## 2023-06-06 PROCEDURE — 86141 C-REACTIVE PROTEIN HS: CPT

## 2023-06-06 PROCEDURE — 82043 UR ALBUMIN QUANTITATIVE: CPT

## 2023-06-06 PROCEDURE — 83704 LIPOPROTEIN BLD QUAN PART: CPT | Mod: 90

## 2023-06-06 PROCEDURE — 85025 COMPLETE CBC W/AUTO DIFF WBC: CPT

## 2023-06-06 PROCEDURE — 36415 COLL VENOUS BLD VENIPUNCTURE: CPT

## 2023-06-06 PROCEDURE — 99000 SPECIMEN HANDLING OFFICE-LAB: CPT

## 2023-06-06 PROCEDURE — 84439 ASSAY OF FREE THYROXINE: CPT

## 2023-06-06 PROCEDURE — 80061 LIPID PANEL: CPT | Mod: 90

## 2023-06-06 PROCEDURE — 83036 HEMOGLOBIN GLYCOSYLATED A1C: CPT

## 2023-06-06 PROCEDURE — 83695 ASSAY OF LIPOPROTEIN(A): CPT

## 2023-06-06 PROCEDURE — 82570 ASSAY OF URINE CREATININE: CPT

## 2023-06-06 PROCEDURE — 84443 ASSAY THYROID STIM HORMONE: CPT

## 2023-06-06 PROCEDURE — 84481 FREE ASSAY (FT-3): CPT

## 2023-06-06 PROCEDURE — 80053 COMPREHEN METABOLIC PANEL: CPT

## 2023-06-10 LAB
CHOLEST SERPL-MCNC: 104 MG/DL
HDL SERPL QN: 8.3 NM
HDL SERPL-SCNC: 27 UMOL/L
HDLC SERPL-MCNC: 31 MG/DL
HLD.LARGE SERPL-SCNC: <2.8 UMOL/L
LDL SERPL QN: 20.4 NM
LDL SERPL-SCNC: 1038 NMOL/L
LDL SMALL SERPL-SCNC: 687 NMOL/L
LDLC SERPL CALC-MCNC: 44 MG/DL
PATHOLOGY STUDY: ABNORMAL
TRIGL SERPL-MCNC: 145 MG/DL
VLDL LARGE SERPL-SCNC: <1.5 NMOL/L
VLDL SERPL QN: 48 NM

## 2023-06-21 ENCOUNTER — OFFICE VISIT (OUTPATIENT)
Dept: OTHER | Facility: CLINIC | Age: 73
End: 2023-06-21
Attending: INTERNAL MEDICINE
Payer: COMMERCIAL

## 2023-06-21 VITALS
HEART RATE: 90 BPM | DIASTOLIC BLOOD PRESSURE: 77 MMHG | BODY MASS INDEX: 27.8 KG/M2 | HEIGHT: 71 IN | WEIGHT: 198.6 LBS | OXYGEN SATURATION: 95 % | SYSTOLIC BLOOD PRESSURE: 105 MMHG

## 2023-06-21 DIAGNOSIS — E11.9 TYPE 2 DIABETES, HBA1C GOAL < 7% (H): ICD-10-CM

## 2023-06-21 DIAGNOSIS — I10 ESSENTIAL HYPERTENSION WITH GOAL BLOOD PRESSURE LESS THAN 130/80: ICD-10-CM

## 2023-06-21 DIAGNOSIS — E78.5 HYPERLIPIDEMIA LDL GOAL <70: Primary | ICD-10-CM

## 2023-06-21 PROCEDURE — G0463 HOSPITAL OUTPT CLINIC VISIT: HCPCS

## 2023-06-21 PROCEDURE — 99214 OFFICE O/P EST MOD 30 MIN: CPT | Performed by: INTERNAL MEDICINE

## 2023-06-21 RX ORDER — GLIMEPIRIDE 4 MG/1
4 TABLET ORAL
Qty: 180 TABLET | Refills: 3 | Status: SHIPPED | OUTPATIENT
Start: 2023-06-21 | End: 2023-10-31

## 2023-06-21 NOTE — PROGRESS NOTES
"Patient is here to discuss follow up    /77 (BP Location: Right arm, Patient Position: Chair, Cuff Size: Adult Regular)   Pulse 90   Ht 5' 11\" (1.803 m)   Wt 198 lb 9.6 oz (90.1 kg)   SpO2 95%   BMI 27.70 kg/m      Questions patient would like addressed today are: N/A.    Refills are needed: No    Has homecare services and agency name:  Kiera CARRION  "

## 2023-06-21 NOTE — PROGRESS NOTES
Dino Daniels is a 72 year old male who presents for                 Hyperlipidemia LDL goal <70  Essential hypertension with goal blood pressure less than 130/80  Type 2 diabetes, HbA1c goal < 7% (H)             HPI: Dino Daniels is a 72 year old male with htn, HLD, DM2, obesity here for lab and med rview. His A1C has gone up. He has no hypoglycemic episodes, he has no h/o UTIs on Jardiance.        Review Of Systems  Skin: negative  Eyes: negative  Ears/Nose/Throat: negative  Respiratory: No shortness of breath, dyspnea on exertion, cough, or hemoptysis  Cardiovascular: negative  Gastrointestinal: negative  Genitourinary: nocturia  Musculoskeletal: ankle pain post fusion  Neurologic: negative  Psychiatric: negative  Hematologic/Lymphatic/Immunologic: negative  Endocrine: negative        Current providers caring for this patient include:  Patient Care Team:  Abel Fernandez MD as PCP - General  Abel Fernandez MD as Assigned Heart and Vascular Provider     Complete Medical and Social history reviewed with patient, outlined below.           Patient Active Problem List   Diagnosis     Pain in joint, ankle and foot     HYPERLIPIDEMIA LDL GOAL <100     Type 2 diabetes, HbA1c goal < 7% (H)     Major depressive disorder, single episode in full remission (H)         Past Medical History           Past Medical History:   Diagnosis Date     Arthritis       knees, left ankle and right thumb     Bladder stone       DEPRESSIVE DISORDER NEC 10/9/2006     resolved in 2009     Hypertension       Mumps       Other and unspecified hyperlipidemia       abstracted 7/17/02.     Type II or unspecified type diabetes mellitus without mention of complication, not stated as uncontrolled              Past Surgical History             Past Surgical History:   Procedure Laterality Date     LASER HOLMIUM LITHOTRIPSY BLADDER   6/22/2012     Procedure: LASER HOLMIUM LITHOTRIPSY BLADDER;  Cystoscopy with Removal of  Bladder Stone with Holmium Laser;  Surgeon: Chaz Cobb MD;  Location: [facility]     TESTICLE SURGERY         VASECTOMY         VASECTOMY         wisdom teeth[                Family History             Family History   Problem Relation Age of Onset      () Father            age 56 leukemia     Cerebrovascular Disease Mother        () Brother           b,2      () Sister           b,4 back problems      () Brother           b, back problems            Social History                Tobacco Use     Smoking status: Never     Smokeless tobacco: Never   Substance Use Topics     Alcohol use: Yes       Alcohol/week: 0.0 standard drinks       Comment: rarely         Diet: diabetic  Physical Activity: generally inactive  Depression Screen:    Over the past 2 weeks, patient has felt down, depressed, or hopeless:  No    Over the past 2 weeks, patient has felt little interest or pleasure in doing things: No     Functional ability/Safety screen:  Up and go test (able to get up and walk longer than 30 seconds): Passed  Patient needs assistance with: nothing  Patient's home has the following possible safety concerns: none identified  Patient has concerns about his hearing:  Yes  Cognitive Screen  Patient repeats three objects (ball, flag, tree)      Clock drawing test:   NORMAL  Recalls three objects after 3 minutes (ball,flag,tree):                                                                                               recalls 3 objects (3 points)     Physical Exam:  /83 (BP Location: Right arm, Patient Position: Chair, Cuff Size: Adult Regular)   Pulse 97   Wt 210 lb 3.2 oz (95.3 kg)   SpO2 97%   BMI 29.32 kg/m     Body mass index is 29.32 kg/m .                        GENERAL APPEARANCE: healthy, alert and no distress  PSYCH: Affect normal/bright.  Mentation within normal limits.  EYES: conjunctiva clear  HENT: ear canals and TM's normal.  Nose and mouth without ulcers, erythema or  lesions  NECK: supple, nontender, no lymphadenopathy  RESP: lungs clear to auscultation - no rales, rhonchi or wheezes  CV: regular rates and rhythm, normal S1 S2, no murmur noted  ABDOMEN:  soft, nontender, no HSM or masses and bowel sounds normal  SKIN: no suspicious lesions or rashes  NEURO: Normal strength and tone,  normal speech and mentation  Extremities: no peripheral edema or tenderness, peripheral pulses normal  MS: extremities normal- no gross deformities noted, no erythema, FROM noted in all extremities  PSYCH: mentation appears normal  LYMPHATICS: no cervical adenopathy     End of Life Planning:   Patient currently has an advanced directive: Yes.  Practitioner is supportive of decision.     Education/Counseling:   Based on review of the above information, the following items were addressed:      Diabetes -  access to diabetes self-management training, medical nutrition therapy, and treatment     Appropriate preventive services were discussed with this patient, including applicable screening as appropriate for cardiovascular disease, diabetes, osteopenia/osteoporosis, and glaucoma.  As appropriate for age/gender, discussed screening for colorectal cancer, prostate cancer, breast cancer, and cervical cancer.   Checklist reviewing preventive services available has been given to the patient.                             Component      Latest Ref Rng & Units 2/16/2022 4/27/2022 11/2/2022   WBC      4.0 - 11.0 10e3/uL 6.1 6.7     RBC Count      4.40 - 5.90 10e6/uL 4.74 5.04     Hemoglobin      13.3 - 17.7 g/dL 14.1 15.1     Hematocrit      40.0 - 53.0 % 42.1 44.7     MCV      78 - 100 fL 89 89     MCH      26.5 - 33.0 pg 29.7 30.0     MCHC      31.5 - 36.5 g/dL 33.5 33.8     RDW      10.0 - 15.0 % 14.0 14.0     Platelet Count      150 - 450 10e3/uL 158 190     % Neutrophils      % 53 53     % Lymphocytes      % 35 38     % Monocytes      % 8 8     % Eosinophils      % 3 2     % Basophils      % 1 0      Absolute Neutrophils      1.6 - 8.3 10e3/uL 3.2 3.5     Absolute Lymphocytes      0.8 - 5.3 10e3/uL 2.1 2.5     Absolute Monocytes      0.0 - 1.3 10e3/uL 0.5 0.5     Absolute Eosinophils      0.0 - 0.7 10e3/uL 0.2 0.1     Absolute Basophils      0.0 - 0.2 10e3/uL 0.0 0.0     Sodium      133 - 144 mmol/L 139 137 137   Potassium      3.4 - 5.3 mmol/L 4.3 4.8 4.8   Chloride      94 - 109 mmol/L 108 106 106   Carbon Dioxide      20 - 32 mmol/L 23 25 26   Anion Gap      3 - 14 mmol/L 8 6 5   Urea Nitrogen      7 - 30 mg/dL 19 16 20   Creatinine      0.66 - 1.25 mg/dL 0.94 0.93 0.83   Calcium      8.5 - 10.1 mg/dL 9.1 10.0 9.8   Glucose      70 - 99 mg/dL 205 (H) 116 (H) 224 (H)   Alkaline Phosphatase      40 - 150 U/L 32 (L)   39 (L)   AST      0 - 45 U/L 17   22   ALT      0 - 70 U/L 37   40   Protein Total      6.8 - 8.8 g/dL 7.2   7.6   Albumin      3.4 - 5.0 g/dL 3.8   4.1   Bilirubin Total      0.2 - 1.3 mg/dL 0.5   0.5   GFR Estimate      >60 mL/min/1.73m2 87 88 >90   Total Cholesterol      <=199 mg/dL   100 109   Triglycerides      30 - 149 mg/dL   121 157 (H)   HDL Cholesterol      40 - 59 mg/dL   35 (L) 34 (L)   LDL Cholesterol      <=129 mg/dL   41 44   HDL Size      >=8.9 nm   8.3 (L) 8.4 (L)   VLDL Size      <=46.7 nm   47.9 (H) 49.8 (H)   LDL Particle Size      >=20.7 nm   20.5 (L) 20.4 (L)   Lge HDL Particle number      >=4.2 umol/L   <2.8 (L) <2.8 (L)   HDL Particle Number NMR      >=33.0 umol/L   28.8 (L) 29.2 (L)   Lge VLDL Part number      <=2.7 nmol/L   1.9 3.6 (H)   Small LDL Particle number      <=634 nmol/L   646 (H) 676 (H)   LDL Particle Number      <=1135 nmol/L   919 1003   EER LipoFit by NMR         See Note See Note   Creatinine Urine      mg/dL 166       Albumin Urine mg/L      mg/L 10       Albumin Urine mg/g Cr      0.00 - 17.00 mg/g Cr 6.02       TSH      0.40 - 4.00 mU/L 2.12 1.39 2.21   Free T3      2.0 - 4.4 pg/mL 2.7 2.6 2.9   T4 Free      0.76 - 1.46 ng/dL 0.86 0.93 0.90    Hemoglobin A1C      0.0 - 5.6 % 8.3 (H) 6.9 (H) 8.3 (H)   Lipoprotein (a)      <=29 mg/dL   20     C-Reactive Protein High Sensitivity      mg/L   1.2     Lipoprotein (a)      <30 mg/dL     16         Component      Latest Ref Rng & Units 2/22/2023 3/3/2023   Urea Nitrogen      8.0 - 23.0 mg/dL 19.6     Creatinine      0.67 - 1.17 mg/dL 0.92     Calcium      8.8 - 10.2 mg/dL 9.9     Glucose      70 - 99 mg/dL 189 (H)     Alkaline Phosphatase      40 - 129 U/L 30 (L)     AST      10 - 50 U/L 25     ALT      10 - 50 U/L 33     Protein Total      6.4 - 8.3 g/dL 7.2     Albumin      3.5 - 5.2 g/dL 4.7     Bilirubin Total      <=1.2 mg/dL 0.4     GFR Estimate      >60 mL/min/1.73m2 88     Total Cholesterol      <=199 mg/dL   100   Triglycerides      30 - 149 mg/dL   110   HDL Cholesterol      40 - 59 mg/dL   32 (L)   LDL Cholesterol      <=129 mg/dL   46   HDL Size      >=8.9 nm   8.4 (L)   VLDL Size      <=46.7 nm   51.9 (H)   LDL Particle Size      >=20.7 nm   20.5 (L)   Lge HDL Particle number      >=4.2 umol/L   <2.8 (L)   HDL Particle Number NMR      >=33.0 umol/L   27.9 (L)   Lge VLDL Part number      <=2.7 nmol/L   2.1   Small LDL Particle number      <=634 nmol/L   728 (H)   LDL Particle Number      <=1135 nmol/L   938   EER LipoFit by NMR         See Note   Creatinine Urine      mg/dL 117.0     Albumin Urine mg/L      mg/L <12.0     Albumin Urine mg/g Cr             PSA Tumor Marker      0.00 - 6.50 ng/mL 2.58     C-Reactive Protein High Sensitivity       <0.15     Hemoglobin A1C      0.0 - 5.6 % 7.6 (H)     Lipoprotein (a)      <30 mg/dL 14     Free T3      2.0 - 4.4 pg/mL 2.8     T4 Free      0.90 - 1.70 ng/dL 1.18     TSH      0.30 - 4.20 uIU/mL 2.18           Component      Latest Ref Rng 6/6/2023  7:33 AM 6/6/2023  7:36 AM   WBC      4.0 - 11.0 10e3/uL 5.4     RBC Count      4.40 - 5.90 10e6/uL 4.86     Hemoglobin      13.3 - 17.7 g/dL 14.4     Hematocrit      40.0 - 53.0 % 42.4     MCV      78 - 100 fL  87     MCH      26.5 - 33.0 pg 29.6     MCHC      31.5 - 36.5 g/dL 34.0     RDW      10.0 - 15.0 % 13.8     Platelet Count      150 - 450 10e3/uL 170     % Neutrophils      % 53     % Lymphocytes      % 37     % Monocytes      % 7     % Eosinophils      % 2     % Basophils      % 1     % Immature Granulocytes      % 0     Absolute Neutrophils      1.6 - 8.3 10e3/uL 2.9     Absolute Lymphocytes      0.8 - 5.3 10e3/uL 2.0     Absolute Monocytes      0.0 - 1.3 10e3/uL 0.4     Absolute Eosinophils      0.0 - 0.7 10e3/uL 0.1     Absolute Basophils      0.0 - 0.2 10e3/uL 0.0     Absolute Immature Granulocytes      <=0.4 10e3/uL 0.0     Sodium      136 - 145 mmol/L 140     Potassium      3.4 - 5.3 mmol/L 4.6     Chloride      98 - 107 mmol/L 105     Carbon Dioxide (CO2)      22 - 29 mmol/L 23     Anion Gap      7 - 15 mmol/L 12     Urea Nitrogen      8.0 - 23.0 mg/dL 19.3     Creatinine      0.67 - 1.17 mg/dL 1.03     Calcium      8.8 - 10.2 mg/dL 9.6     Glucose      70 - 99 mg/dL 165 (H)     Alkaline Phosphatase      40 - 129 U/L 33 (L)     AST      10 - 50 U/L 21     ALT      10 - 50 U/L 20     Protein Total      6.4 - 8.3 g/dL 7.0     Albumin      3.5 - 5.2 g/dL 4.4     Bilirubin Total      <=1.2 mg/dL 0.5     GFR Estimate      >60 mL/min/1.73m2 77     Total Cholesterol      <=199 mg/dL 104     Triglycerides      30 - 149 mg/dL 145     HDL Cholesterol      40 - 59 mg/dL 31 (L)     LDL Cholesterol      <=129 mg/dL 44     HDL Size      >=8.9 nm 8.3 (L)     VLDL Size      <=46.7 nm 48.0 (H)     LDL Particle Size      >=20.7 nm 20.4 (L)     Lge HDL Particle number      >=4.2 umol/L <2.8 (L)     HDL Particle Number NMR      >=33.0 umol/L 27.0 (L)     Lge VLDL Part number      <=2.7 nmol/L <1.5     Small LDL Particle number      <=634 nmol/L 687 (H)     LDL Particle Number      <=1135 nmol/L 1038     EER LipoFit by NMR See Note     Creatinine Urine      mg/dL  68.9    Albumin Urine mg/L      mg/L  <12.0    Albumin Urine mg/g  Cr  --    TSH      0.30 - 4.20 uIU/mL 2.06     T4 Free      0.90 - 1.70 ng/dL 1.11     Free T3      2.0 - 4.4 pg/mL 2.7     Lipoprotein (a)      <30 mg/dL 17     Hemoglobin A1C      0.0 - 5.6 % 7.7 (H)     C-Reactive Protein High Sensitivity 0.22        Legend:  (H) High  (L) Low    A/P:                  (E78.5) Hyperlipidemia LDL goal <70  Comment: at LDL-C, cardiac CRP, Lp(a) goal,  but not LDL-P goal. This is due to his insulin resistance.   Plan: No change to rosuvastatin (CRESTOR) 40 MG tablet, Check        Lipoprotein (a), LipoFit by NMR, T3 Free, T4         free, TSH in 3 months.            (I10) Essential hypertension with goal blood pressure less than 130/80  Comment: BP at goal  Plan: no change to lisinopril (ZESTRIL) 20 MG tablet            (E11.00) Type 2 diabetes mellitus with A1C goal < 7 %  Comment: Not at goal but transiently improved. A1C improved from 8.3 to 7.6 initially and is now 7.7. His only other pharmacologic options are increasing his LAWSON, adding pioglitazone, or adding insulin.  RTC three months for labs, see me two weeks later. He is told to stop Jardiance for any UTI sxs.  Plan: liraglutide (VICTOZA) 18 MG/3ML solution,         metFORMIN (GLUCOPHAGE) 1000 MG tablet,               empagliflozin (JARDIANCE) 25 MG TABS tablet, Check        Comprehensive metabolic panel, Albumin Random         Urine Quantitative with Creat Ratio, C-Reactive        Protein, High Sensitivity, Hemoglobin A1c,                                    32 minutes total medical care on today's date.

## 2023-07-11 DIAGNOSIS — E11.00 TYPE 2 DIABETES MELLITUS WITH HYPEROSMOLARITY WITHOUT COMA, WITHOUT LONG-TERM CURRENT USE OF INSULIN (H): ICD-10-CM

## 2023-08-03 ENCOUNTER — TELEPHONE (OUTPATIENT)
Dept: OTHER | Facility: CLINIC | Age: 73
End: 2023-08-03
Payer: COMMERCIAL

## 2023-08-03 DIAGNOSIS — Z51.89 ENCOUNTER FOR OTHER SPECIFIED AFTERCARE: ICD-10-CM

## 2023-08-03 DIAGNOSIS — Z01.818 PRE-OP EXAM: Primary | ICD-10-CM

## 2023-08-03 NOTE — TELEPHONE ENCOUNTER
LM for patient to schedule non-fasting labs (the ones ordered 08/03/23 - the other labs are fasting which are already scheduled for 09/13/23) and a Pre-Op (60 mins) with Dr Fernandez. Pt's surgery date is 09/22/23 and his Pre-Op needs to be done 2 weeks prior (09/08/23) with labs one week before his Pre-Op.    The only way he could do these appointments at the same time is if he was able to do his Pre-Op on 09/20/23 with labs on 09/06/23.    If Patient needs 2 lab appointments make sure to specify the appt request dates  in the appt note.    Any questions please ask RN or myself.

## 2023-08-03 NOTE — TELEPHONE ENCOUNTER
[facility]    Who is the name of the provider? Dr Fernandez    What is the location you see this provider at/preferred location? [place]    Person calling / Facility: Dino    Phone number:  [phone]     Nurse call back needed:  N    Reason for call:  Pt is scheduled for labs on 09/13/23 and follow up on 09/27/23. Pt needs a Pre-Op appt 2 weeks prior to his scheduled Uro Lift on 09/22/23. Pt is wondering if he needs to have separate appointments or if they can be combined?    Pharmacy location:  N/A    Outside Imaging: N/A    Can we leave a detailed message on this number?  Y    Additional Info: Pt can be called back Or sent a message on [employer]

## 2023-08-03 NOTE — TELEPHONE ENCOUNTER
The appointments can be combined however the appointment needs to be 60 minutes and pre-op labs need to be completed prior. I ordered these in EPIC.    Nancy BORJAS, RN    [facility]  Office: [phone]  Fax: [phone]

## 2023-08-03 NOTE — TELEPHONE ENCOUNTER
Patient is scheduled for his 3 non fasting labs on 08/30/23 and 60 min Pre-Op with Dr Fernandez on 09/06/23.

## 2023-08-30 ENCOUNTER — LAB (OUTPATIENT)
Dept: LAB | Facility: CLINIC | Age: 73
End: 2023-08-30
Payer: COMMERCIAL

## 2023-08-30 DIAGNOSIS — Z01.818 PRE-OP EXAM: ICD-10-CM

## 2023-08-30 DIAGNOSIS — Z51.89 ENCOUNTER FOR OTHER SPECIFIED AFTERCARE: ICD-10-CM

## 2023-08-30 LAB
APTT PPP: 34 SECONDS (ref 22–38)
HGB BLD-MCNC: 15 G/DL (ref 13.3–17.7)
INR PPP: 1.06 (ref 0.85–1.15)

## 2023-08-30 PROCEDURE — 36415 COLL VENOUS BLD VENIPUNCTURE: CPT

## 2023-08-30 PROCEDURE — 85730 THROMBOPLASTIN TIME PARTIAL: CPT

## 2023-08-30 PROCEDURE — 85018 HEMOGLOBIN: CPT

## 2023-08-30 PROCEDURE — 85610 PROTHROMBIN TIME: CPT

## 2023-09-06 ENCOUNTER — OFFICE VISIT (OUTPATIENT)
Dept: OTHER | Facility: CLINIC | Age: 73
End: 2023-09-06
Attending: INTERNAL MEDICINE
Payer: COMMERCIAL

## 2023-09-06 VITALS
DIASTOLIC BLOOD PRESSURE: 85 MMHG | HEIGHT: 71 IN | WEIGHT: 202 LBS | BODY MASS INDEX: 28.28 KG/M2 | SYSTOLIC BLOOD PRESSURE: 120 MMHG | HEART RATE: 86 BPM | OXYGEN SATURATION: 95 %

## 2023-09-06 DIAGNOSIS — Z01.818 PRE-OP EXAM: Primary | ICD-10-CM

## 2023-09-06 DIAGNOSIS — H02.403 PTOSIS OF BOTH EYELIDS: ICD-10-CM

## 2023-09-06 PROCEDURE — G0463 HOSPITAL OUTPT CLINIC VISIT: HCPCS

## 2023-09-06 PROCEDURE — 99215 OFFICE O/P EST HI 40 MIN: CPT | Performed by: INTERNAL MEDICINE

## 2023-09-06 PROCEDURE — 93005 ELECTROCARDIOGRAM TRACING: CPT | Performed by: INTERNAL MEDICINE

## 2023-09-06 NOTE — PROGRESS NOTES
"Patient is here to discuss follow up    /85 (BP Location: Right arm, Patient Position: Chair, Cuff Size: Adult Regular)   Pulse 86   Ht 5' 11\" (1.803 m)   Wt 202 lb (91.6 kg)   SpO2 95%   BMI 28.17 kg/m      Questions patient would like addressed today are: N/A.    Refills are needed: No    Has homecare services and agency name:  [facility]    "

## 2023-09-06 NOTE — PROGRESS NOTES
[facility]ASCULAR [facility]IN[facility] [place]  [address]  [facility] [address]  Phone: [phone]  Fax: [phone]  Primary Provider: Abel Horowitz  Pre-op Performing Provider: ABEL HOROWITZ      PREOPERATIVE EVALUATION:  Today's date: 9/6/2023    Dino Daniels is a 72 year old male who presents for a preoperative evaluation.    Surgical Information:  Surgery/Procedure: Uro Lift  Surgery Location: [facility]  Surgeon: Dr. Axel Starr  Surgery Date: 9/22/23  Time of Surgery: 1pm  Where patient plans to recover: At home with family  Fax number for surgical facility: [phone]    Assessment & Plan     The proposed surgical procedure is considered LOW risk.    (Z01.818) Pre-op exam  (primary encounter diagnosis)  Comment: The patietn is medically stable. The procedure is low risk. The patient is medically cleared to proceed with the planned procedure.  Plan: EKG 12-lead, tracing only        See below regarding meds which need to be held preoperatively.         Possible Sleep Apnea: No {  Implanted Devices: None     - No identified additional risk factors other than previously addressed    Antiplatelet or Anticoagulation Medication Instructions:   - aspirin: Discontinue aspirin 7-10 days prior to procedure to reduce bleeding risk. It should be resumed postoperatively.     Additional Medication Instructions:   - metformin: HOLD day of surgery.   - sulfonylurea (e.g. glyburide, glipizide): HOLD day of surgery   - SGLT2 Inhibitor (canagliflozin, dapagliflozin, or empagliflozin): HOLD 3 days before surgery.    - GLP-1 Injectable (exenitide, liraglutide, semaglutide, dulaglutide, etc.): HOLD 7 days before surgery     RECOMMENDATION:  APPROVAL GIVEN to proceed with proposed procedure, without further diagnostic evaluation.    Prescription drug management  45 minutes spent by me on the date of the encounter doing chart review, history and exam, documentation and further  activities per the note      Subjective       HPI related to upcoming procedure: Dino Daniels is a 72 year old male with BPH w/o prostate cancer. His BPH has led to nocturia. He wishes to proceed with a UroLift procedure as his nocturia and polyuria have become increasingly frequent in the last year.    Health Care Directive:  Patient does not have a Health Care Directive or Living Will: Patient states has Advance Directive and will bring in a copy to clinic.He wishes to be coded should the need arise.     Preoperative Review of :   reviewed - controlled substances reflected in medication list.      Status of Chronic Conditions:  See problem list for active medical problems.  Problems all longstanding and stable, except as noted/documented.  See ROS for pertinent symptoms related to these conditions.    Review of Systems  CONSTITUTIONAL: NEGATIVE for fever, chills, change in weight  INTEGUMENTARY/SKIN: NEGATIVE for worrisome rashes, moles or lesions  EYES: NEGATIVE for vision changes or irritation  ENT/MOUTH: NEGATIVE for ear, mouth and throat problems  RESP: NEGATIVE for significant cough or SOB  CV: NEGATIVE for chest pain, palpitations or peripheral edema  GI: NEGATIVE for nausea, abdominal pain, heartburn, or change in bowel habits  MUSCULOSKELETAL: NEGATIVE for significant arthralgias or myalgia  NEURO: NEGATIVE for weakness, dizziness or paresthesias  ENDOCRINE: NEGATIVE for temperature intolerance, skin/hair changes  HEME: NEGATIVE for bleeding problems  PSYCHIATRIC: NEGATIVE for changes in mood or affect  : polyuria as above.    Patient Active Problem List    Diagnosis Date Noted    Major depressive disorder, single episode in full remission (H) 12/03/2015     Priority: Medium    Type 2 diabetes, HbA1c goal < 7% (H) 07/16/2012     Priority: Medium    HYPERLIPIDEMIA LDL GOAL <100 10/31/2010     Priority: Medium    Pain in joint, ankle and foot 10/26/2007     Priority: Medium      Past Medical  History:   Diagnosis Date    Arthritis     knees, left ankle and right thumb    Bladder stone     DEPRESSIVE DISORDER NEC 10/9/2006    resolved in 2009    Hypertension     Mumps     Other and unspecified hyperlipidemia     abstracted 7/17/02.    Type II or unspecified type diabetes mellitus without mention of complication, not stated as uncontrolled      Past Surgical History:   Procedure Laterality Date    LASER HOLMIUM LITHOTRIPSY BLADDER  6/22/2012    Procedure: LASER HOLMIUM LITHOTRIPSY BLADDER;  Cystoscopy with Removal of Bladder Stone with Holmium Laser;  Surgeon: Chaz Cobb MD;  Location: [facility] OR    TESTICLE SURGERY      VASECTOMY      VASECTOMY      wisdom teeth[       Current Outpatient Medications   Medication Sig Dispense Refill    aspirin (ASA) 81 MG EC tablet Take 1 tablet (81 mg) by mouth daily 90 tablet 3    blood glucose (CONTOUR NEXT TEST) test strip TEST 4 TIMES DAILY 400 strip 3    blood glucose calibration (CONTOUR NEXT CONTROL LOW VI SOLN) Low solution Use to calibrate blood glucose monitor as directed. 1 Bottle 1    blood glucose calibration (CONTOUR NEXT CONTROL NORMAL VI SOLN) Normal solution Use to calibrate blood glucose monitor as needed as directed. 1 Bottle 1    blood glucose monitoring (CONTOUR NEXT MONITOR W/DEVICE KIT) meter device kit Use to test blood sugar 4 times daily or as directed. 1 kit 1    empagliflozin (JARDIANCE) 25 MG TABS tablet Take 1 tablet (25 mg) by mouth daily 90 tablet 3    finasteride (PROSCAR) 5 MG tablet Take 1 tablet (5 mg) by mouth daily 90 tablet 1    glimepiride (AMARYL) 2 MG tablet TAKE 1 TABLET (2 MG) BY MOUTH 2 TIMES DAILY 180 tablet 2    glimepiride (AMARYL) 4 MG tablet Take 1 tablet (4 mg) by mouth 2 times daily (before meals) 180 tablet 3    insulin pen needle (32G X 6 MM) 32G X 6 MM miscellaneous Use 1pen needle daily or as directed. 30 each 3    KRILL OIL OR None Entered      liraglutide (VICTOZA) 18 MG/3ML solution Inject 1.8 mg  "Subcutaneous daily 27 mL 3    lisinopril (ZESTRIL) 20 MG tablet Take 1 tablet (20 mg) by mouth daily 90 tablet 3    metFORMIN (GLUCOPHAGE) 1000 MG tablet Take 1 tablet (1,000 mg) by mouth 2 times daily (with meals) 180 tablet 3    Microlet Lancets MISC Use to test blood sugar 4 times daily. 300 each 3    MULTIPLE VITAMINS CAPS   OR 1 capsule qd       ONETOUCH DELICA LANCETS 33G MISC TEST 4 TIMES DAILY 300 each 1    rosuvastatin (CRESTOR) 40 MG tablet Take 1 tablet (40 mg) by mouth daily 90 tablet 3    tamsulosin (FLOMAX) 0.4 MG capsule Take 1 capsule (0.4 mg) by mouth daily 90 capsule 1    zolpidem (AMBIEN) 10 MG tablet TAKE 1 TABLET BY MOUTH AT BEDTIME AS NEEDED FOR SLEEP 10 tablet 5       Allergies   Allergen Reactions    No Known Drug Allergy         Social History     Tobacco Use    Smoking status: Never    Smokeless tobacco: Never   Substance Use Topics    Alcohol use: Not Currently     Comment: rarely     FH:     Father  of leukemia in 50's  Mother  in her 90s after breaking her leg.      History   Drug Use No         Objective     /85 (BP Location: Right arm, Patient Position: Chair, Cuff Size: Adult Regular)   Pulse 86   Ht 5' 11\" (1.803 m)   Wt 202 lb (91.6 kg)   SpO2 95%   BMI 28.17 kg/m      Physical Exam    GENERAL APPEARANCE: healthy, alert and no distress     EYES: EOMI,  PERRL     HENT: ear canals and TM's normal and nose and mouth without ulcers or lesions     NECK: no adenopathy, no asymmetry, masses, or scars and thyroid normal to palpation     RESP: lungs clear to auscultation - no rales, rhonchi or wheezes     CV: regular rates and rhythm, normal S1 S2, no S3 or S4 and no murmur, click or rub     ABDOMEN:  soft, nontender, no HSM or masses and bowel sounds normal     MS: extremities normal- no gross deformities noted, no evidence of inflammation in joints, FROM in all extremities.     SKIN: no suspicious lesions or rashes     NEURO: Normal strength and tone, sensory exam " grossly normal, mentation intact and speech normal     PSYCH: mentation appears normal. and affect normal/bright     LYMPHATICS: No cervical adenopathy       Component      Latest Ref Rng 8/30/2023  7:47 AM   INR      0.85 - 1.15  1.06    PTT      22 - 38 Seconds 34    Hemoglobin      13.3 - 17.7 g/dL 15.0          Diagnostics:     EKG: appears normal, NSR, normal axis, normal intervals, no acute ST/T changes c/w ischemia, no LVH by voltage criteria    Revised Cardiac Risk Index (RCRI):  The patient has the following serious cardiovascular risks for perioperative complications:   - No serious cardiac risks = 0 points     RCRI Interpretation: 0 points: Class I (very low risk - 0.4% complication rate)         Signed Electronically by: Abel Fernandez MD  Copy of this evaluation report is provided to requesting physician.

## 2023-09-07 NOTE — PROGRESS NOTES
Preoperative exam notes, EKG, labs & face sheet faxed to [facility] (Dr. Starr) at [phone].     [employer] 09/07/23 9::48      Referral for Opththamology faxed to Dr. Saida Barrientos at [phone].    [employer] 09/07/23 9:53.        Lorna Huffman RN BSN  [facility]  [phone]

## 2023-09-13 ENCOUNTER — LAB (OUTPATIENT)
Dept: LAB | Facility: CLINIC | Age: 73
End: 2023-09-13
Payer: COMMERCIAL

## 2023-09-13 DIAGNOSIS — E78.5 HYPERLIPIDEMIA LDL GOAL <70: ICD-10-CM

## 2023-09-13 DIAGNOSIS — E11.9 TYPE 2 DIABETES, HBA1C GOAL < 7% (H): ICD-10-CM

## 2023-09-13 LAB
ALBUMIN SERPL BCG-MCNC: 4.8 G/DL (ref 3.5–5.2)
ALP SERPL-CCNC: 34 U/L (ref 40–129)
ALT SERPL W P-5'-P-CCNC: 27 U/L (ref 0–70)
ANION GAP SERPL CALCULATED.3IONS-SCNC: 11 MMOL/L (ref 7–15)
APO A-I SERPL-MCNC: 15 MG/DL
AST SERPL W P-5'-P-CCNC: 25 U/L (ref 0–45)
BILIRUB SERPL-MCNC: 0.5 MG/DL
BUN SERPL-MCNC: 25.1 MG/DL (ref 8–23)
CALCIUM SERPL-MCNC: 10 MG/DL (ref 8.8–10.2)
CHLORIDE SERPL-SCNC: 103 MMOL/L (ref 98–107)
CREAT SERPL-MCNC: 1.21 MG/DL (ref 0.67–1.17)
CRP SERPL HS-MCNC: <0.15 MG/L
DEPRECATED HCO3 PLAS-SCNC: 25 MMOL/L (ref 22–29)
EGFRCR SERPLBLD CKD-EPI 2021: 64 ML/MIN/1.73M2
GLUCOSE SERPL-MCNC: 197 MG/DL (ref 70–99)
HBA1C MFR BLD: 7.7 % (ref 0–5.6)
POTASSIUM SERPL-SCNC: 4.9 MMOL/L (ref 3.4–5.3)
PROT SERPL-MCNC: 7.5 G/DL (ref 6.4–8.3)
SODIUM SERPL-SCNC: 139 MMOL/L (ref 136–145)

## 2023-09-13 PROCEDURE — 80061 LIPID PANEL: CPT | Mod: 90

## 2023-09-13 PROCEDURE — 99000 SPECIMEN HANDLING OFFICE-LAB: CPT

## 2023-09-13 PROCEDURE — 36415 COLL VENOUS BLD VENIPUNCTURE: CPT

## 2023-09-13 PROCEDURE — 83695 ASSAY OF LIPOPROTEIN(A): CPT

## 2023-09-13 PROCEDURE — 83036 HEMOGLOBIN GLYCOSYLATED A1C: CPT

## 2023-09-13 PROCEDURE — 83704 LIPOPROTEIN BLD QUAN PART: CPT | Mod: 90

## 2023-09-13 PROCEDURE — 86141 C-REACTIVE PROTEIN HS: CPT

## 2023-09-13 PROCEDURE — 80053 COMPREHEN METABOLIC PANEL: CPT

## 2023-09-17 LAB
CHOLEST SERPL-MCNC: 110 MG/DL
HDL SERPL QN: 8.1 NM
HDL SERPL-SCNC: 29.8 UMOL/L
HDLC SERPL-MCNC: 33 MG/DL
HLD.LARGE SERPL-SCNC: <2.8 UMOL/L
LDL SERPL QN: 20.5 NM
LDL SERPL-SCNC: 1125 NMOL/L
LDL SMALL SERPL-SCNC: 812 NMOL/L
LDLC SERPL CALC-MCNC: 49 MG/DL
PATHOLOGY STUDY: ABNORMAL
TRIGL SERPL-MCNC: 139 MG/DL
VLDL LARGE SERPL-SCNC: 2.1 NMOL/L
VLDL SERPL QN: 49.3 NM

## 2023-09-27 ENCOUNTER — OFFICE VISIT (OUTPATIENT)
Dept: OTHER | Facility: CLINIC | Age: 73
End: 2023-09-27
Attending: INTERNAL MEDICINE
Payer: COMMERCIAL

## 2023-09-27 VITALS
BODY MASS INDEX: 26.93 KG/M2 | HEART RATE: 77 BPM | WEIGHT: 198.8 LBS | SYSTOLIC BLOOD PRESSURE: 120 MMHG | OXYGEN SATURATION: 95 % | HEIGHT: 72 IN | DIASTOLIC BLOOD PRESSURE: 78 MMHG

## 2023-09-27 DIAGNOSIS — E78.5 HYPERLIPIDEMIA LDL GOAL <70: ICD-10-CM

## 2023-09-27 DIAGNOSIS — I10 ESSENTIAL HYPERTENSION WITH GOAL BLOOD PRESSURE LESS THAN 130/80: Primary | ICD-10-CM

## 2023-09-27 DIAGNOSIS — E11.9 TYPE 2 DIABETES MELLITUS TREATED WITHOUT INSULIN (H): ICD-10-CM

## 2023-09-27 PROCEDURE — 99214 OFFICE O/P EST MOD 30 MIN: CPT | Performed by: INTERNAL MEDICINE

## 2023-09-27 PROCEDURE — G0463 HOSPITAL OUTPT CLINIC VISIT: HCPCS | Performed by: INTERNAL MEDICINE

## 2023-09-27 NOTE — PROGRESS NOTES
"Patient is here to discuss follow up    /81 (BP Location: Right arm, Patient Position: Chair, Cuff Size: Adult Regular)   Pulse 77   Ht 5' 11.5\" (1.816 m)   Wt 198 lb 12.8 oz (90.2 kg)   SpO2 95%   BMI 27.34 kg/m      Questions patient would like addressed today are: N/A.    Refills are needed: No    Has homecare services and agency name:  [facility]    "

## 2023-09-27 NOTE — PROGRESS NOTES
Dino Daniels is a 72 year old male who presents for                    Hyperlipidemia LDL goal <70  Essential hypertension with goal blood pressure less than 130/80  Type 2 diabetes, HbA1c goal < 7% (H)              HPI: Dino Daniels is a 72 year old male with htn, HLD, DM2, obesity here for lab and med rview. His A1C has gone up. He has no hypoglycemic episodes, he has no h/o UTIs on Jardiance.        Review Of Systems  Skin: negative  Eyes: negative  Ears/Nose/Throat: negative  Respiratory: No shortness of breath, dyspnea on exertion, cough, or hemoptysis  Cardiovascular: negative  Gastrointestinal: negative  Genitourinary: nocturia  Musculoskeletal: ankle pain post fusion  Neurologic: negative  Psychiatric: negative  Hematologic/Lymphatic/Immunologic: negative  Endocrine: negative        Current providers caring for this patient include:  Patient Care Team:  Abel Fernandez MD as PCP - General  Abel Fernandez MD as Assigned Heart and Vascular Provider     Complete Medical and Social history reviewed with patient, outlined below.           Patient Active Problem List   Diagnosis    Pain in joint, ankle and foot    HYPERLIPIDEMIA LDL GOAL <100    Type 2 diabetes, HbA1c goal < 7% (H)    Major depressive disorder, single episode in full remission (H)         Past Medical History           Past Medical History:   Diagnosis Date    Arthritis       knees, left ankle and right thumb    Bladder stone      DEPRESSIVE DISORDER NEC 10/9/2006     resolved in 2009    Hypertension      Mumps      Other and unspecified hyperlipidemia       abstracted 7/17/02.    Type II or unspecified type diabetes mellitus without mention of complication, not stated as uncontrolled              Past Surgical History             Past Surgical History:   Procedure Laterality Date    LASER HOLMIUM LITHOTRIPSY BLADDER   6/22/2012     Procedure: LASER HOLMIUM LITHOTRIPSY BLADDER;  Cystoscopy with Removal of Bladder  Stone with Holmium Laser;  Surgeon: Chaz Cobb MD;  Location: [facility] OR    TESTICLE SURGERY        VASECTOMY        VASECTOMY        wisdom teeth[                Family History             Family History   Problem Relation Age of Onset     () Father            age 56 leukemia    Cerebrovascular Disease Mother       () Brother           b,     () Sister           b,4 back problems     () Brother           b, back problems            Social History                Tobacco Use    Smoking status: Never    Smokeless tobacco: Never   Substance Use Topics    Alcohol use: Yes       Alcohol/week: 0.0 standard drinks       Comment: rarely         Diet: diabetic  Physical Activity: generally inactive  Depression Screen:    Over the past 2 weeks, patient has felt down, depressed, or hopeless:  No    Over the past 2 weeks, patient has felt little interest or pleasure in doing things: No     Functional ability/Safety screen:  Up and go test (able to get up and walk longer than 30 seconds): Passed  Patient needs assistance with: nothing  Patient's home has the following possible safety concerns: none identified  Patient has concerns about his hearing:  Yes  Cognitive Screen  Patient repeats three objects (ball, flag, tree)      Clock drawing test:   NORMAL  Recalls three objects after 3 minutes (ball,flag,tree):                                                                                               recalls 3 objects (3 points)     Physical Exam:  /83 (BP Location: Right arm, Patient Position: Chair, Cuff Size: Adult Regular)   Pulse 97   Wt 210 lb 3.2 oz (95.3 kg)   SpO2 97%   BMI 29.32 kg/m     Body mass index is 29.32 kg/m .                        GENERAL APPEARANCE: healthy, alert and no distress  PSYCH: Affect normal/bright.  Mentation within normal limits.  EYES: conjunctiva clear  HENT: ear canals and TM's normal.  Nose and mouth without ulcers, erythema or lesions  NECK: supple,  nontender, no lymphadenopathy  RESP: lungs clear to auscultation - no rales, rhonchi or wheezes  CV: regular rates and rhythm, normal S1 S2, no murmur noted  ABDOMEN:  soft, nontender, no HSM or masses and bowel sounds normal  SKIN: no suspicious lesions or rashes  NEURO: Normal strength and tone,  normal speech and mentation  Extremities: no peripheral edema or tenderness, peripheral pulses normal  MS: extremities normal- no gross deformities noted, no erythema, FROM noted in all extremities  PSYCH: mentation appears normal  LYMPHATICS: no cervical adenopathy     End of Life Planning:   Patient currently has an advanced directive: Yes.  Practitioner is supportive of decision.     Education/Counseling:   Based on review of the above information, the following items were addressed:      Diabetes -  access to diabetes self-management training, medical nutrition therapy, and treatment     Appropriate preventive services were discussed with this patient, including applicable screening as appropriate for cardiovascular disease, diabetes, osteopenia/osteoporosis, and glaucoma.  As appropriate for age/gender, discussed screening for colorectal cancer, prostate cancer, breast cancer, and cervical cancer.   Checklist reviewing preventive services available has been given to the patient.                Component      Latest Ref Rng 6/6/2023  7:33 AM 9/13/2023  7:36 AM   WBC      4.0 - 11.0 10e3/uL 5.4     RBC Count      4.40 - 5.90 10e6/uL 4.86     Hemoglobin      13.3 - 17.7 g/dL 14.4     Hematocrit      40.0 - 53.0 % 42.4     MCV      78 - 100 fL 87     MCH      26.5 - 33.0 pg 29.6     MCHC      31.5 - 36.5 g/dL 34.0     RDW      10.0 - 15.0 % 13.8     Platelet Count      150 - 450 10e3/uL 170     % Neutrophils      % 53     % Lymphocytes      % 37     % Monocytes      % 7     % Eosinophils      % 2     % Basophils      % 1     % Immature Granulocytes      % 0     Absolute Neutrophils      1.6 - 8.3 10e3/uL 2.9     Absolute  Lymphocytes      0.8 - 5.3 10e3/uL 2.0     Absolute Monocytes      0.0 - 1.3 10e3/uL 0.4     Absolute Eosinophils      0.0 - 0.7 10e3/uL 0.1     Absolute Basophils      0.0 - 0.2 10e3/uL 0.0     Absolute Immature Granulocytes      <=0.4 10e3/uL 0.0     Sodium      136 - 145 mmol/L 140  139    Potassium      3.4 - 5.3 mmol/L 4.6  4.9    Chloride      98 - 107 mmol/L 105  103    Carbon Dioxide (CO2)      22 - 29 mmol/L 23  25    Anion Gap      7 - 15 mmol/L 12  11    Urea Nitrogen      8.0 - 23.0 mg/dL 19.3  25.1 (H)    Creatinine      0.67 - 1.17 mg/dL 1.03  1.21 (H)    Calcium      8.8 - 10.2 mg/dL 9.6  10.0    Glucose      70 - 99 mg/dL 165 (H)  197 (H)    Alkaline Phosphatase      40 - 129 U/L 33 (L)  34 (L)    AST      0 - 45 U/L 21  25    ALT      0 - 70 U/L 20  27    Protein Total      6.4 - 8.3 g/dL 7.0  7.5    Albumin      3.5 - 5.2 g/dL 4.4  4.8    Bilirubin Total      <=1.2 mg/dL 0.5  0.5    GFR Estimate      >60 mL/min/1.73m2 77  64    Total Cholesterol      <=199 mg/dL 104  110    Triglycerides      30 - 149 mg/dL 145  139    HDL Cholesterol      40 - 59 mg/dL 31 (L)  33 (L)    LDL Cholesterol      <=129 mg/dL 44  49    HDL Size      >=8.9 nm 8.3 (L)  8.1 (L)    VLDL Size      <=46.7 nm 48.0 (H)  49.3 (H)    LDL Particle Size      >=20.7 nm 20.4 (L)  20.5 (L)    Lge HDL Particle number      >=4.2 umol/L <2.8 (L)  <2.8 (L)    HDL Particle Number NMR      >=33.0 umol/L 27.0 (L)  29.8 (L)    Lge VLDL Part number      <=2.7 nmol/L <1.5  2.1    Small LDL Particle number      <=634 nmol/L 687 (H)  812 (H)    LDL Particle Number      <=1135 nmol/L 1038  1125    EER LipoFit by NMR See Note  See Note    TSH      0.30 - 4.20 uIU/mL 2.06     T4 Free      0.90 - 1.70 ng/dL 1.11     Free T3      2.0 - 4.4 pg/mL 2.7     Lipoprotein (a)      <30 mg/dL 17  15    Hemoglobin A1C      0.0 - 5.6 % 7.7 (H)  7.7 (H)    C-Reactive Protein High Sensitivity 0.22  <0.15       Legend:  (H) High  (L) Low     A/P:                   (E78.5) Hyperlipidemia LDL goal <70  Comment: at LDL-C, cardiac CRP, Lp(a) goal,  but not LDL-P goal. This is due to his insulin resistance.   Plan: No change to rosuvastatin (CRESTOR) 40 MG tablet, Check        Lipoprotein (a), LipoFit by NMR, T3 Free, T4         free, TSH in 3 months.            (I10) Essential hypertension with goal blood pressure less than 130/80  Comment: BP  at goal  Plan: no change to lisinopril (ZESTRIL) 20 MG tablet            (E11.00) Type 2 diabetes mellitus with A1C goal < 7 %  Comment: Not at goal but initially transiently improved. A1C improved from 8.3 to 7.6 initially and is now 7.7 again for the second time.  His only other pharmacologic options are adding pioglitazone, or adding insulin. He did not want to add meds in June, 2023 but his A1C has not changed despite prior assertions that he would increase activity and attempt to lose weight. He really wants to lose weight to try to bring A1C down.  RTC six months for labs, see me two weeks later. He is told to stop Jardiance for any UTI sxs. If not at goal on 1/1/25, despite all interval attempts, add insulin at that time.   Plan: liraglutide (VICTOZA) 18 MG/3ML solution,         metFORMIN (GLUCOPHAGE) 1000 MG tablet,               empagliflozin (JARDIANCE) 25 MG TABS tablet, Check        Comprehensive metabolic panel, Albumin Random         Urine Quantitative with Creat Ratio, C-Reactive        Protein, High Sensitivity, Hemoglobin A1c,                                      32 minutes total medical care on today's date.

## 2023-09-29 ENCOUNTER — TELEPHONE (OUTPATIENT)
Dept: OTHER | Facility: CLINIC | Age: 73
End: 2023-09-29
Payer: COMMERCIAL

## 2023-09-29 DIAGNOSIS — Z01.818 PREOP GENERAL PHYSICAL EXAM: Primary | ICD-10-CM

## 2023-09-29 NOTE — TELEPHONE ENCOUNTER
Patient completed pre-op on 9/6/23. It is up to the eye physician if they require a pre-op within 30 days or not and if the pre-op needs to be re-done.     Pre-op appointment needs to be in-person and it needs to be 60 min not 30. Please find a 60 min opening not 30. Labs ordered.     Nancy BORJAS, RN    [facility]  Office: [phone]  Fax: [phone]

## 2023-09-29 NOTE — TELEPHONE ENCOUNTER
[facility]    Who is the name of the provider?:  Dr Fernandez    What is the location you see this provider at/preferred location?: [place]  Person calling / Facility: Dino Daniels  Phone number:  [phone]  Nurse call back needed:  NO    Reason for call:  Patient requesting pre-op for 10/18 eyelid reduction surgery. Dr Fernandez referring provider. Lab and 30 min OV scheduled:    Future Appointments   Date Time Provider Department Center   10/3/2023  8:15 AM [facility] LAB [facility]   10/12/2023 11:40 AM Abel Fernandez MD [facility]     Patient asking if follow up can be virtual and if prior (recent) pre-op labs are usable. Please advise scheduling.     Pharmacy location:  n/a  Outside Imaging: n/a   Can we leave a detailed message on this number?  YES

## 2023-10-02 NOTE — TELEPHONE ENCOUNTER
It does not appear that any 60 min blocks are open prior to 10/18. Can patient be overbooked on any particular days?

## 2023-10-03 ENCOUNTER — LAB (OUTPATIENT)
Dept: LAB | Facility: CLINIC | Age: 73
End: 2023-10-03
Payer: COMMERCIAL

## 2023-10-03 DIAGNOSIS — Z01.818 PREOP GENERAL PHYSICAL EXAM: ICD-10-CM

## 2023-10-03 LAB
APTT PPP: 33 SECONDS (ref 22–38)
HGB BLD-MCNC: 15.7 G/DL (ref 13.3–17.7)
INR PPP: 1.06 (ref 0.85–1.15)

## 2023-10-03 PROCEDURE — 36415 COLL VENOUS BLD VENIPUNCTURE: CPT

## 2023-10-03 PROCEDURE — 85018 HEMOGLOBIN: CPT

## 2023-10-03 PROCEDURE — 85730 THROMBOPLASTIN TIME PARTIAL: CPT

## 2023-10-03 PROCEDURE — 85610 PROTHROMBIN TIME: CPT

## 2023-10-12 NOTE — TELEPHONE ENCOUNTER
Patient stating that his surgery has been cancelled and has not yet been rescheduled. He will call [facility] back once rescheduled.

## 2023-10-29 ENCOUNTER — MYC MEDICAL ADVICE (OUTPATIENT)
Dept: OTHER | Facility: CLINIC | Age: 73
End: 2023-10-29
Payer: COMMERCIAL

## 2023-10-29 DIAGNOSIS — E11.9 TYPE 2 DIABETES, HBA1C GOAL < 7% (H): Primary | ICD-10-CM

## 2023-10-30 DIAGNOSIS — E11.00 TYPE 2 DIABETES MELLITUS WITH HYPEROSMOLARITY WITHOUT COMA, WITHOUT LONG-TERM CURRENT USE OF INSULIN (H): ICD-10-CM

## 2023-10-30 RX ORDER — GLIMEPIRIDE 4 MG/1
4 TABLET ORAL
Qty: 20 TABLET | Refills: 0 | Status: SHIPPED | OUTPATIENT
Start: 2023-10-30 | End: 2023-11-03

## 2023-10-30 RX ORDER — EMPAGLIFLOZIN 25 MG/1
25 TABLET, FILM COATED ORAL DAILY
Qty: 90 TABLET | Refills: 3 | Status: SHIPPED | OUTPATIENT
Start: 2023-10-30 | End: 2024-03-27

## 2023-10-30 NOTE — TELEPHONE ENCOUNTER
"Last Written Prescription Date:  11/16/22  Last Fill Quantity: 90,  # refills: 3   Last office visit: 9/27/2023 ; last virtual visit: Visit date not found with prescribing provider:     Future Office Visit:      Requested Prescriptions   Pending Prescriptions Disp Refills    JARDIANCE 25 MG TABS tablet [Pharmacy Med Name: JARDIANCE TABS 25MG] 90 tablet 3     Sig: TAKE 1 TABLET DAILY       Sodium Glucose Co-Transport Inhibitor Agents Passed - 10/30/2023  1:03 AM        Passed - Patient has documented A1c within the specified period of time.     If HgbA1C is 8 or greater, it needs to be on file within the past 3 months.  If less than 8, must be on file within the past 6 months.     Recent Labs   Lab Test 09/13/23  0736   A1C 7.7*             Passed - No creatinine >1.4 or GFR <45 within the past 12 mos     Recent Labs   Lab Test 09/13/23 0736 07/21/21  0815 12/08/20  0712   GFRESTIMATED 64   < > 83   GFRESTBLACK  --   --  >90    < > = values in this interval not displayed.       Recent Labs   Lab Test 09/13/23 0736   CR 1.21*             Passed - Medication is active on med list        Passed - Patient is age 18 or older        Passed - Patient has documented normal Potassium within the last 12 mos.     Recent Labs   Lab Test 09/13/23  0736   POTASSIUM 4.9             Passed - Recent (6 mo) or future (30 days) visit within the authorizing provider's specialty     Patient had office visit in the last 6 months or has a visit in the next 30 days with authorizing provider or within the authorizing provider's specialty.  See \"Patient Info\" tab in inbasket, or \"Choose Columns\" in Meds & Orders section of the refill encounter.               Prescription approved per [facility] Refill Protocol.    Nancy BORJAS RN    [facility]  Office: [phone]  Fax: [phone]      "

## 2023-10-31 RX ORDER — GLIMEPIRIDE 4 MG/1
4 TABLET ORAL
Qty: 180 TABLET | Refills: 3 | Status: SHIPPED | OUTPATIENT
Start: 2023-10-31 | End: 2024-03-27

## 2023-11-03 RX ORDER — GLIMEPIRIDE 4 MG/1
4 TABLET ORAL
Qty: 20 TABLET | Refills: 0 | Status: SHIPPED | OUTPATIENT
Start: 2023-11-03 | End: 2023-11-03

## 2023-11-03 RX ORDER — GLIMEPIRIDE 4 MG/1
4 TABLET ORAL
Qty: 40 TABLET | Refills: 0 | Status: SHIPPED | OUTPATIENT
Start: 2023-11-03 | End: 2023-11-27

## 2023-11-09 DIAGNOSIS — R33.9 URINARY RETENTION: ICD-10-CM

## 2023-11-09 RX ORDER — FINASTERIDE 5 MG/1
1 TABLET, FILM COATED ORAL DAILY
Qty: 90 TABLET | Refills: 2 | Status: SHIPPED | OUTPATIENT
Start: 2023-11-09 | End: 2024-03-27

## 2023-11-09 NOTE — TELEPHONE ENCOUNTER
"Last Written Prescription Date:  5/17/23  Last Fill Quantity: 90,  # refills: 1   Last office visit: 9/27/2023 ; last virtual visit: Visit date not found with prescribing provider:     Future Office Visit:      Requested Prescriptions   Pending Prescriptions Disp Refills    finasteride (PROSCAR) 5 MG tablet [Pharmacy Med Name: FINASTERIDE TABS 5MG] 90 tablet 3     Sig: TAKE 1 TABLET DAILY       BPH Agents Passed - 11/9/2023 12:11 AM        Passed - Recent (12 mo) or future (30 days) visit within the authorizing provider's department     Patient has had an office visit with the authorizing provider or a provider within the authorizing providers department within the previous 12 mos or has a future within next 30 days. See \"Patient Info\" tab in inbasket, or \"Choose Columns\" in Meds & Orders section of the refill encounter.              Passed - Medication is active on med list        Passed - Patient is 18 years of age or older           Prescription approved per [facility] Refill Protocol.    Nancy BORJAS, RN    [facility]  Office: [phone]  Fax: [phone]      "

## 2023-11-22 ENCOUNTER — TRANSFERRED RECORDS (OUTPATIENT)
Dept: HEALTH INFORMATION MANAGEMENT | Facility: CLINIC | Age: 73
End: 2023-11-22
Payer: COMMERCIAL

## 2023-11-24 DIAGNOSIS — E78.5 HYPERLIPIDEMIA LDL GOAL <70: ICD-10-CM

## 2023-11-24 RX ORDER — ROSUVASTATIN CALCIUM 40 MG/1
40 TABLET, COATED ORAL DAILY
Qty: 90 TABLET | Refills: 3 | Status: SHIPPED | OUTPATIENT
Start: 2023-11-24 | End: 2024-03-27

## 2023-11-24 NOTE — TELEPHONE ENCOUNTER
"Last Written Prescription Date:  11/16/22  Last Fill Quantity: 90,  # refills: 3   Last office visit: 9/27/2023   \"No change to rosuvastatin (CRESTOR) 40 MG tablet\"  Future Office Visit:  due 3/2024.    Prescription approved per [facility] Refill Protocol.  KAYODE PleitezN, RN  "

## 2023-11-26 ENCOUNTER — MYC MEDICAL ADVICE (OUTPATIENT)
Dept: OTHER | Facility: CLINIC | Age: 73
End: 2023-11-26
Payer: COMMERCIAL

## 2023-11-26 DIAGNOSIS — E11.9 TYPE 2 DIABETES, HBA1C GOAL < 7% (H): ICD-10-CM

## 2023-11-27 RX ORDER — GLIMEPIRIDE 4 MG/1
4 TABLET ORAL
Qty: 40 TABLET | Refills: 0 | Status: SHIPPED | OUTPATIENT
Start: 2023-11-27 | End: 2024-03-27

## 2023-12-14 ENCOUNTER — TRANSFERRED RECORDS (OUTPATIENT)
Dept: HEALTH INFORMATION MANAGEMENT | Facility: CLINIC | Age: 73
End: 2023-12-14
Payer: COMMERCIAL

## 2023-12-18 ENCOUNTER — MYC MEDICAL ADVICE (OUTPATIENT)
Dept: OTHER | Facility: CLINIC | Age: 73
End: 2023-12-18
Payer: COMMERCIAL

## 2023-12-18 DIAGNOSIS — H02.403 PTOSIS OF BOTH EYELIDS: Primary | ICD-10-CM

## 2023-12-18 NOTE — TELEPHONE ENCOUNTER
Patient requesting new referral be faxed to Dr. Cristal Ramirez at [phone]- sent via [employer] fax.    Nancy BORJAS, RN    [facility]  Office: [phone]  Fax: [phone]

## 2024-01-11 DIAGNOSIS — E11.00 TYPE 2 DIABETES MELLITUS WITH HYPEROSMOLARITY WITHOUT COMA, WITHOUT LONG-TERM CURRENT USE OF INSULIN (H): ICD-10-CM

## 2024-01-12 NOTE — TELEPHONE ENCOUNTER
liraglutide (VICTOZA) 18 MG/3ML solution   Last Written Prescription Date:  11/21/22  Last Fill Quantity: 27 ml,  # refills: 3    Last visit with provider:  10/12/23 (no Show.  Last visit:  9/6/23  Follow up recommended:  3/2024    Unable to fill per [facility] protocol.  Medication and pharmacy loaded.

## 2024-01-13 ENCOUNTER — HEALTH MAINTENANCE LETTER (OUTPATIENT)
Age: 74
End: 2024-01-13

## 2024-01-15 ENCOUNTER — MYC MEDICAL ADVICE (OUTPATIENT)
Dept: OTHER | Facility: CLINIC | Age: 74
End: 2024-01-15
Payer: COMMERCIAL

## 2024-01-15 DIAGNOSIS — E11.00 TYPE 2 DIABETES MELLITUS WITH HYPEROSMOLARITY WITHOUT COMA, WITHOUT LONG-TERM CURRENT USE OF INSULIN (H): ICD-10-CM

## 2024-01-15 RX ORDER — LIRAGLUTIDE 6 MG/ML
1.8 INJECTION SUBCUTANEOUS DAILY
Qty: 27 ML | Refills: 0 | Status: SHIPPED | OUTPATIENT
Start: 2024-01-15 | End: 2024-01-16

## 2024-01-16 RX ORDER — LIRAGLUTIDE 6 MG/ML
1.8 INJECTION SUBCUTANEOUS DAILY
Qty: 27 ML | Refills: 0 | Status: SHIPPED | OUTPATIENT
Start: 2024-01-16 | End: 2024-03-27

## 2024-01-16 NOTE — TELEPHONE ENCOUNTER
VICTOZA PEN 18 MG/3ML soln   Last Written Prescription Date:  1/15/24  Last Fill Quantity: 27 ml,  # refills: 0    Last visit with provider:  10/12/23 (no Show.  Last visit:  9/6/23  Follow up recommended:  3/2024     Unable to fill per [facility] protocol.  Medication and pharmacy loaded.

## 2024-01-29 ENCOUNTER — TELEPHONE (OUTPATIENT)
Dept: OTHER | Facility: CLINIC | Age: 74
End: 2024-01-29
Payer: COMMERCIAL

## 2024-01-29 DIAGNOSIS — Z01.818 PRE-OP EXAM: Primary | ICD-10-CM

## 2024-01-29 DIAGNOSIS — Z51.89 ENCOUNTER FOR OTHER SPECIFIED AFTERCARE: ICD-10-CM

## 2024-01-29 DIAGNOSIS — Z01.818 PRE-OPERATIVE GENERAL PHYSICAL EXAMINATION: ICD-10-CM

## 2024-01-29 NOTE — TELEPHONE ENCOUNTER
[facility]    Who is the name of the provider?:  DENNIS OHROWITZ   What is the location you see this provider at/preferred location?: [place]  Person calling / Facility: Dino Daniels  Phone number:  [phone]   Nurse call back needed:  YES     Reason for call:  Patient wanting to do follow up and pre op for 4/9 dermatology procedure (eyelids).    Labs are dated 3/27. Does the lab dating need to be adjusted to accommodate timing for pre op?    Pharmacy location:     [facility] - [place], [place] - [facility] [address]  [facility] - [facility], [place] - [address] [facility] - [place], [place] - [address]   Outside Imaging: n/a   Can we leave a detailed message on this number?  YES     1/29/2024, 9:23 AM

## 2024-01-29 NOTE — TELEPHONE ENCOUNTER
Left voicemail with instructions for patient to call back to schedule their appointment(s)    January 29, 2024 , 12:06 PM

## 2024-01-29 NOTE — TELEPHONE ENCOUNTER
Additional pre-operative labs added.  Labs and exam must be within 30 days of planned surgery.  I have adjusted the dates of the labs to start 3/15/24 to make sure they can be done two weeks prior to proposed surgery on 4/9.    Please make sure appointment with Dr. Fernandez is 60 minutes and is noted as a preoperative exam for blepharoplasty and follow up to fasting labs.

## 2024-02-06 DIAGNOSIS — I10 ESSENTIAL HYPERTENSION WITH GOAL BLOOD PRESSURE LESS THAN 130/80: ICD-10-CM

## 2024-02-06 DIAGNOSIS — E11.00 TYPE 2 DIABETES MELLITUS WITH HYPEROSMOLARITY WITHOUT COMA, WITHOUT LONG-TERM CURRENT USE OF INSULIN (H): ICD-10-CM

## 2024-02-07 ENCOUNTER — TRANSFERRED RECORDS (OUTPATIENT)
Dept: HEALTH INFORMATION MANAGEMENT | Facility: CLINIC | Age: 74
End: 2024-02-07

## 2024-02-07 RX ORDER — LISINOPRIL 20 MG/1
20 TABLET ORAL DAILY
Qty: 90 TABLET | Refills: 2 | Status: SHIPPED | OUTPATIENT
Start: 2024-02-07 | End: 2024-03-27

## 2024-02-07 NOTE — TELEPHONE ENCOUNTER
Unable to refill per [facility] protocol.  Med and pharm loaded.    Nancy BORJAS, RN    [facility]  Office: [phone]  Fax: [phone]

## 2024-02-21 ENCOUNTER — TRANSFERRED RECORDS (OUTPATIENT)
Dept: HEALTH INFORMATION MANAGEMENT | Facility: CLINIC | Age: 74
End: 2024-02-21
Payer: COMMERCIAL

## 2024-02-25 ENCOUNTER — MYC REFILL (OUTPATIENT)
Dept: OTHER | Facility: CLINIC | Age: 74
End: 2024-02-25
Payer: COMMERCIAL

## 2024-02-25 DIAGNOSIS — F51.11 PRIMARY HYPERSOMNIA: ICD-10-CM

## 2024-02-26 NOTE — TELEPHONE ENCOUNTER
zolpidem (AMBIEN) 10 MG tablet     Last Written Prescription Date:  11/16/2022  Last Fill Quantity: 10,  # refills: 5    Last visit with provider:  9/27/2023  Follow up scheduled:  3/27/24    Unable to fill per [facility] protocol.  Medication and pharmacy loaded.

## 2024-02-27 RX ORDER — ZOLPIDEM TARTRATE 10 MG/1
TABLET ORAL
Qty: 10 TABLET | Refills: 5 | Status: SHIPPED | OUTPATIENT
Start: 2024-02-27 | End: 2024-03-27

## 2024-03-13 ENCOUNTER — LAB (OUTPATIENT)
Dept: LAB | Facility: CLINIC | Age: 74
End: 2024-03-13
Payer: COMMERCIAL

## 2024-03-13 DIAGNOSIS — I10 ESSENTIAL HYPERTENSION WITH GOAL BLOOD PRESSURE LESS THAN 130/80: ICD-10-CM

## 2024-03-13 DIAGNOSIS — Z51.89 ENCOUNTER FOR OTHER SPECIFIED AFTERCARE: ICD-10-CM

## 2024-03-13 DIAGNOSIS — E78.5 HYPERLIPIDEMIA LDL GOAL <70: ICD-10-CM

## 2024-03-13 DIAGNOSIS — E11.9 TYPE 2 DIABETES MELLITUS TREATED WITHOUT INSULIN (H): ICD-10-CM

## 2024-03-13 DIAGNOSIS — Z01.818 PRE-OPERATIVE GENERAL PHYSICAL EXAMINATION: ICD-10-CM

## 2024-03-13 LAB
APTT PPP: 31 SECONDS (ref 22–38)
BASOPHILS # BLD AUTO: 0 10E3/UL (ref 0–0.2)
BASOPHILS NFR BLD AUTO: 1 %
EOSINOPHIL # BLD AUTO: 0.1 10E3/UL (ref 0–0.7)
EOSINOPHIL NFR BLD AUTO: 2 %
ERYTHROCYTE [DISTWIDTH] IN BLOOD BY AUTOMATED COUNT: 13.9 % (ref 10–15)
HBA1C MFR BLD: 7 % (ref 0–5.6)
HCT VFR BLD AUTO: 46 % (ref 40–53)
HGB BLD-MCNC: 15.6 G/DL (ref 13.3–17.7)
IMM GRANULOCYTES # BLD: 0 10E3/UL
IMM GRANULOCYTES NFR BLD: 0 %
INR PPP: 1.01 (ref 0.85–1.15)
LYMPHOCYTES # BLD AUTO: 2.1 10E3/UL (ref 0.8–5.3)
LYMPHOCYTES NFR BLD AUTO: 38 %
MCH RBC QN AUTO: 30.6 PG (ref 26.5–33)
MCHC RBC AUTO-ENTMCNC: 33.9 G/DL (ref 31.5–36.5)
MCV RBC AUTO: 90 FL (ref 78–100)
MONOCYTES # BLD AUTO: 0.4 10E3/UL (ref 0–1.3)
MONOCYTES NFR BLD AUTO: 7 %
NEUTROPHILS # BLD AUTO: 3 10E3/UL (ref 1.6–8.3)
NEUTROPHILS NFR BLD AUTO: 53 %
PLATELET # BLD AUTO: 160 10E3/UL (ref 150–450)
RBC # BLD AUTO: 5.1 10E6/UL (ref 4.4–5.9)
WBC # BLD AUTO: 5.6 10E3/UL (ref 4–11)

## 2024-03-13 PROCEDURE — 84443 ASSAY THYROID STIM HORMONE: CPT

## 2024-03-13 PROCEDURE — 80061 LIPID PANEL: CPT | Mod: 90

## 2024-03-13 PROCEDURE — 85025 COMPLETE CBC W/AUTO DIFF WBC: CPT

## 2024-03-13 PROCEDURE — 80053 COMPREHEN METABOLIC PANEL: CPT

## 2024-03-13 PROCEDURE — 83695 ASSAY OF LIPOPROTEIN(A): CPT

## 2024-03-13 PROCEDURE — 99000 SPECIMEN HANDLING OFFICE-LAB: CPT

## 2024-03-13 PROCEDURE — 83036 HEMOGLOBIN GLYCOSYLATED A1C: CPT

## 2024-03-13 PROCEDURE — 84439 ASSAY OF FREE THYROXINE: CPT

## 2024-03-13 PROCEDURE — 36415 COLL VENOUS BLD VENIPUNCTURE: CPT

## 2024-03-13 PROCEDURE — 85610 PROTHROMBIN TIME: CPT

## 2024-03-13 PROCEDURE — 85730 THROMBOPLASTIN TIME PARTIAL: CPT

## 2024-03-13 PROCEDURE — 83704 LIPOPROTEIN BLD QUAN PART: CPT | Mod: 90

## 2024-03-13 PROCEDURE — 86141 C-REACTIVE PROTEIN HS: CPT

## 2024-03-13 PROCEDURE — 84481 FREE ASSAY (FT-3): CPT

## 2024-03-14 LAB
ALBUMIN SERPL BCG-MCNC: 4.8 G/DL (ref 3.5–5.2)
ALP SERPL-CCNC: 35 U/L (ref 40–150)
ALT SERPL W P-5'-P-CCNC: 31 U/L (ref 0–70)
ANION GAP SERPL CALCULATED.3IONS-SCNC: 13 MMOL/L (ref 7–15)
APO A-I SERPL-MCNC: 15 MG/DL
AST SERPL W P-5'-P-CCNC: 27 U/L (ref 0–45)
BILIRUB SERPL-MCNC: 0.6 MG/DL
BUN SERPL-MCNC: 27.2 MG/DL (ref 8–23)
CALCIUM SERPL-MCNC: 9.9 MG/DL (ref 8.8–10.2)
CHLORIDE SERPL-SCNC: 103 MMOL/L (ref 98–107)
CREAT SERPL-MCNC: 1.08 MG/DL (ref 0.67–1.17)
CRP SERPL HS-MCNC: <0.15 MG/L
DEPRECATED HCO3 PLAS-SCNC: 23 MMOL/L (ref 22–29)
EGFRCR SERPLBLD CKD-EPI 2021: 72 ML/MIN/1.73M2
GLUCOSE SERPL-MCNC: 156 MG/DL (ref 70–99)
POTASSIUM SERPL-SCNC: 4.9 MMOL/L (ref 3.4–5.3)
PROT SERPL-MCNC: 7.5 G/DL (ref 6.4–8.3)
SODIUM SERPL-SCNC: 139 MMOL/L (ref 135–145)
T3FREE SERPL-MCNC: 2.7 PG/ML (ref 2–4.4)
T4 FREE SERPL-MCNC: 1.07 NG/DL (ref 0.9–1.7)
TSH SERPL DL<=0.005 MIU/L-ACNC: 2.66 UIU/ML (ref 0.3–4.2)

## 2024-03-17 LAB
CHOLEST SERPL-MCNC: 125 MG/DL
HDL SERPL QN: 8.4 NM
HDL SERPL-SCNC: 29.7 UMOL/L
HDLC SERPL-MCNC: 37 MG/DL
HLD.LARGE SERPL-SCNC: <2.8 UMOL/L
LDL SERPL QN: 20.4 NM
LDL SERPL-SCNC: 1091 NMOL/L
LDL SMALL SERPL-SCNC: 774 NMOL/L
LDLC SERPL CALC-MCNC: 59 MG/DL
PATHOLOGY STUDY: ABNORMAL
TRIGL SERPL-MCNC: 145 MG/DL
VLDL LARGE SERPL-SCNC: 2.5 NMOL/L
VLDL SERPL QN: 50 NM

## 2024-03-27 ENCOUNTER — OFFICE VISIT (OUTPATIENT)
Dept: OTHER | Facility: CLINIC | Age: 74
End: 2024-03-27
Attending: INTERNAL MEDICINE
Payer: COMMERCIAL

## 2024-03-27 VITALS
OXYGEN SATURATION: 94 % | HEIGHT: 72 IN | WEIGHT: 193.8 LBS | DIASTOLIC BLOOD PRESSURE: 78 MMHG | BODY MASS INDEX: 26.25 KG/M2 | SYSTOLIC BLOOD PRESSURE: 116 MMHG | HEART RATE: 94 BPM

## 2024-03-27 DIAGNOSIS — H02.403 PTOSIS OF BOTH EYELIDS: ICD-10-CM

## 2024-03-27 DIAGNOSIS — E78.5 HYPERLIPIDEMIA LDL GOAL <70: ICD-10-CM

## 2024-03-27 DIAGNOSIS — I10 ESSENTIAL HYPERTENSION WITH GOAL BLOOD PRESSURE LESS THAN 130/80: ICD-10-CM

## 2024-03-27 DIAGNOSIS — E11.00 TYPE 2 DIABETES MELLITUS WITH HYPEROSMOLARITY WITHOUT COMA, WITHOUT LONG-TERM CURRENT USE OF INSULIN (H): ICD-10-CM

## 2024-03-27 DIAGNOSIS — Z01.818 PRE-OP EXAM: Primary | ICD-10-CM

## 2024-03-27 DIAGNOSIS — F51.11 PRIMARY HYPERSOMNIA: ICD-10-CM

## 2024-03-27 DIAGNOSIS — E11.9 TYPE 2 DIABETES, HBA1C GOAL < 7% (H): ICD-10-CM

## 2024-03-27 PROCEDURE — 93000 ELECTROCARDIOGRAM COMPLETE: CPT | Performed by: INTERNAL MEDICINE

## 2024-03-27 PROCEDURE — G0463 HOSPITAL OUTPT CLINIC VISIT: HCPCS | Performed by: INTERNAL MEDICINE

## 2024-03-27 PROCEDURE — 99215 OFFICE O/P EST HI 40 MIN: CPT | Performed by: INTERNAL MEDICINE

## 2024-03-27 RX ORDER — LIRAGLUTIDE 6 MG/ML
1.8 INJECTION SUBCUTANEOUS DAILY
Qty: 27 ML | Refills: 3 | Status: SHIPPED | OUTPATIENT
Start: 2024-03-27 | End: 2024-06-04

## 2024-03-27 RX ORDER — ROSUVASTATIN CALCIUM 40 MG/1
40 TABLET, COATED ORAL DAILY
Qty: 90 TABLET | Refills: 3 | Status: SHIPPED | OUTPATIENT
Start: 2024-03-27

## 2024-03-27 RX ORDER — ZOLPIDEM TARTRATE 10 MG/1
TABLET ORAL
Qty: 10 TABLET | Refills: 5 | Status: SHIPPED | OUTPATIENT
Start: 2024-03-27

## 2024-03-27 RX ORDER — GLIMEPIRIDE 4 MG/1
4 TABLET ORAL
Qty: 180 TABLET | Refills: 3 | Status: SHIPPED | OUTPATIENT
Start: 2024-03-27

## 2024-03-27 RX ORDER — LISINOPRIL 20 MG/1
20 TABLET ORAL DAILY
Qty: 90 TABLET | Refills: 3 | Status: SHIPPED | OUTPATIENT
Start: 2024-03-27

## 2024-03-27 NOTE — PROGRESS NOTES
Preoperative Evaluation  [facility] [place]  [address]  [place] [address]  Phone: [phone]  Fax: [phone]  Primary Provider: Dennis Horowitz  Pre-op Performing Provider: DENNIS HOROWITZ  Mar 27, 2024       Dino is a 73 year old, presenting for the following:  RECHECK (Pre Op)       Pre-op exam  Essential hypertension with goal blood pressure less than 130/80  Type 2 diabetes mellitus with hyperosmolarity without coma, without long-term current use of insulin (H)  Ptosis of both eyelids  Hyperlipidemia LDL goal <70      Surgical Information  Surgery/Procedure: Ptosis correction  Surgery Location: [facility]   Surgeon: Dr. Sampson  Surgery Date: 04/09/2024  Time of Surgery: 2pm  Where patient plans to recover: At home with family  Fax number for surgical facility: [phone]    Assessment & Plan     The proposed surgical procedure is considered LOW risk.    Pre-op exam    - EKG 12-lead complete w/read - Clinics    Essential hypertension with goal blood pressure less than 130/80  At goal  - lisinopril (ZESTRIL) 20 MG tablet; Take 1 tablet (20 mg) by mouth daily  - CBC with Platelets & Differential; Future    Type 2 diabetes mellitus with A1C goal < 7 %  Not quite at goal. Improve diet further.  - empagliflozin (JARDIANCE) 25 MG TABS tablet; Take 1 tablet (25 mg) by mouth daily  - liraglutide (VICTOZA PEN) 18 MG/3ML solution; Inject 1.8 mg Subcutaneous daily  - metFORMIN (GLUCOPHAGE) 1000 MG tablet; Take 1 tablet (1,000 mg) by mouth 2 times daily (with meals)  - Albumin Random Urine Quantitative with Creat Ratio; Future  - Comprehensive metabolic panel; Future  - Hemoglobin A1c; Future    Ptosis of both eyelids  -Cleared for surgery    Hyperlipidemia LDL goal <70  At goal, Rpeat labs in six months, RTC two weeks alter.   - rosuvastatin (CRESTOR) 40 MG tablet; Take 1 tablet (40 mg) by mouth daily  - C-Reactive Protein, High  Sensitivity; Future  - LipoFit by NMR; Future  - Lipoprotein (a); Future  - T3 Free; Future  - T4 free; Future  - TSH; Future    Type 2 diabetes, HbA1c goal < 7% (H)  Needs the below.  - glimepiride (AMARYL) 4 MG tablet; Take 1 tablet (4 mg) by mouth 2 times daily (before meals)    Primary hypersomnia  Needs the below.  - zolpidem (AMBIEN) 10 MG tablet; TAKE 1 TABLET BY MOUTH AT BEDTIME AS NEEDED FOR SLEEP      Possible Sleep Apnea: No    Implanted Device Done       - No identified additional risk factors other than previously addressed    Antiplatelet or Anticoagulation Medication Instructions   - aspirin: Hold for seven days preop    Additional Medication Instructions   - metformin: HOLD day of surgery.   - SGLT2 Inhibitor (ertugliflozin): HOLD 4 days before surgery.    - GLP-1 Injectable (exenitide, liraglutide, semaglutide, dulaglutide, etc.): HOLD 7 days before surgery     Recommendation  APPROVAL GIVEN to proceed with proposed procedure, without further diagnostic evaluation.    Review of the result(s) of each unique test - labs as below      The longitudinal care of plan for Dino was addressed during this visit. Due to added complexity of care, we will continue to support Dino BASS Brentchandrakant  and the subsequent management of these conditions and with ongoing continuity of care for these conditions.     53 minutes spent by me on the date of the encounter doing chart review, history and exam, documentation and further activities per the note    Subjective       HPI related to upcoming procedure: He has had bilateral ptosis for many years, and it is now affecting his vision. He now wishes to proceed with corrective surgery.            Health Care Directive  Patient does not have a Health Care Directive or Living Will: Full code.    Preoperative Review of    reviewed - no record of controlled substances prescribed.      Status of Chronic Conditions:  See problem list for active medical problems.  Problems all  longstanding and stable, except as noted/documented.  See ROS for pertinent symptoms related to these conditions.    Patient Active Problem List    Diagnosis Date Noted    Major depressive disorder, single episode in full remission (H24) 12/03/2015     Priority: Medium    Type 2 diabetes, HbA1c goal < 7% (H) 07/16/2012     Priority: Medium    HYPERLIPIDEMIA LDL GOAL <100 10/31/2010     Priority: Medium    Pain in joint, ankle and foot 10/26/2007     Priority: Medium      Past Medical History:   Diagnosis Date    Arthritis     knees, left ankle and right thumb    Bladder stone     DEPRESSIVE DISORDER NEC 10/9/2006    resolved in 2009    Hypertension     Mumps     Other and unspecified hyperlipidemia     abstracted 7/17/02.    Type II or unspecified type diabetes mellitus without mention of complication, not stated as uncontrolled      Past Surgical History:   Procedure Laterality Date    LASER HOLMIUM LITHOTRIPSY BLADDER  6/22/2012    Procedure: LASER HOLMIUM LITHOTRIPSY BLADDER;  Cystoscopy with Removal of Bladder Stone with Holmium Laser;  Surgeon: Chaz Cobb MD;  Location: [facility] OR    TESTICLE SURGERY      VASECTOMY      VASECTOMY      wisdom teeth[       Current Outpatient Medications   Medication Sig Dispense Refill    aspirin (ASA) 81 MG EC tablet Take 1 tablet (81 mg) by mouth daily 90 tablet 3    blood glucose (CONTOUR NEXT TEST) test strip TEST 4 TIMES DAILY 400 strip 3    blood glucose calibration (CONTOUR NEXT CONTROL LOW VI SOLN) Low solution Use to calibrate blood glucose monitor as directed. 1 Bottle 1    blood glucose calibration (CONTOUR NEXT CONTROL NORMAL VI SOLN) Normal solution Use to calibrate blood glucose monitor as needed as directed. 1 Bottle 1    blood glucose monitoring (CONTOUR NEXT MONITOR W/DEVICE KIT) meter device kit Use to test blood sugar 4 times daily or as directed. 1 kit 1    finasteride (PROSCAR) 5 MG tablet TAKE 1 TABLET DAILY 90 tablet 2    glimepiride (AMARYL) 4 MG  tablet Take 1 tablet (4 mg) by mouth 2 times daily (before meals) 40 tablet 0    glimepiride (AMARYL) 4 MG tablet Take 1 tablet (4 mg) by mouth 2 times daily (before meals) 180 tablet 3    insulin pen needle (32G X 6 MM) 32G X 6 MM miscellaneous Use 1pen needle daily or as directed. 30 each 3    JARDIANCE 25 MG TABS tablet TAKE 1 TABLET DAILY 90 tablet 3    KRILL OIL OR None Entered      liraglutide (VICTOZA PEN) 18 MG/3ML solution Inject 1.8 mg Subcutaneous daily 27 mL 0    lisinopril (ZESTRIL) 20 MG tablet TAKE 1 TABLET DAILY 90 tablet 2    metFORMIN (GLUCOPHAGE) 1000 MG tablet TAKE 1 TABLET TWICE A DAY WITH MEALS 180 tablet 2    Microlet Lancets MISC Use to test blood sugar 4 times daily. 300 each 3    MULTIPLE VITAMINS CAPS   OR 1 capsule qd       ONETOUCH DELICA LANCETS 33G MISC TEST 4 TIMES DAILY 300 each 1    rosuvastatin (CRESTOR) 40 MG tablet TAKE 1 TABLET DAILY 90 tablet 3    tamsulosin (FLOMAX) 0.4 MG capsule Take 1 capsule (0.4 mg) by mouth daily 90 capsule 1    zolpidem (AMBIEN) 10 MG tablet TAKE 1 TABLET BY MOUTH AT BEDTIME AS NEEDED FOR SLEEP 10 tablet 5       Allergies   Allergen Reactions    No Known Drug Allergy         Social History     Tobacco Use    Smoking status: Never    Smokeless tobacco: Never   Substance Use Topics    Alcohol use: Not Currently     Comment: rarely     Family History   Problem Relation Age of Onset     () Father          age 56 leukemia    Cerebrovascular Disease Mother      () Brother         b,     () Sister         b, back problems     () Brother         b, back problems     History   Drug Use No         Review of Systems      Review Of Systems  Skin: negative  Eyes: positive for ptosis  Ears/Nose/Throat: negative  Respiratory: No shortness of breath, dyspnea on exertion, cough, or hemoptysis  Cardiovascular: negative  Gastrointestinal: negative  Genitourinary: positive for polyuria in association with BPH rather than DM2  Musculoskeletal:  "negative  Neurologic: positive for peripheral neuropathy  Psychiatric: negative  Hematologic/Lymphatic/Immunologic: negative  Endocrine: negative      Objective    /78 (BP Location: Right arm, Patient Position: Chair, Cuff Size: Adult Regular)   Pulse 94   Ht 5' 11.5\" (1.816 m)   Wt 193 lb 12.8 oz (87.9 kg)   SpO2 94%   BMI 26.65 kg/m     Estimated body mass index is 26.65 kg/m  as calculated from the following:    Height as of this encounter: 5' 11.5\" (1.816 m).    Weight as of this encounter: 193 lb 12.8 oz (87.9 kg).  Physical Exam  GENERAL: alert and no distress  EYES: PERRL , bilateral ptosis  HENT: ear canals and TM's normal, nose and mouth without ulcers or lesions  NECK: no adenopathy, no asymmetry, masses, or scars  RESP: lungs clear to auscultation - no rales, rhonchi or wheezes  CV: regular rate and rhythm, normal S1 S2, no S3 or S4, no murmur, click or rub, no peripheral edema  ABDOMEN: soft, nontender, no hepatosplenomegaly, no masses and bowel sounds normal  MS: no gross musculoskeletal defects noted, no edema  SKIN: no suspicious lesions or rashes  NEURO: Normal strength and tone, mentation intact and speech normal  PSYCH: mentation appears normal, affect normal/bright         Diagnostics        Component      Latest Ref [facility] 3/13/2024  8:11 AM   WBC      4.0 - 11.0 10e3/uL 5.6    RBC Count      4.40 - 5.90 10e6/uL 5.10    Hemoglobin      13.3 - 17.7 g/dL 15.6    Hematocrit      40.0 - 53.0 % 46.0    MCV      78 - 100 fL 90    MCH      26.5 - 33.0 pg 30.6    MCHC      31.5 - 36.5 g/dL 33.9    RDW      10.0 - 15.0 % 13.9    Platelet Count      150 - 450 10e3/uL 160    % Neutrophils      % 53    % Lymphocytes      % 38    % Monocytes      % 7    % Eosinophils      % 2    % Basophils      % 1    % Immature Granulocytes      % 0    Absolute Neutrophils      1.6 - 8.3 10e3/uL 3.0    Absolute Lymphocytes      0.8 - 5.3 10e3/uL 2.1    Absolute Monocytes      0.0 - 1.3 10e3/uL 0.4    Absolute " Eosinophils      0.0 - 0.7 10e3/uL 0.1    Absolute Basophils      0.0 - 0.2 10e3/uL 0.0    Absolute Immature Granulocytes      <=0.4 10e3/uL 0.0    Sodium      135 - 145 mmol/L 139    Potassium      3.4 - 5.3 mmol/L 4.9    Carbon Dioxide (CO2)      22 - 29 mmol/L 23    Anion Gap      7 - 15 mmol/L 13    Urea Nitrogen      8.0 - 23.0 mg/dL 27.2 (H)    Creatinine      0.67 - 1.17 mg/dL 1.08    GFR Estimate      >60 mL/min/1.73m2 72    Calcium      8.8 - 10.2 mg/dL 9.9    Chloride      98 - 107 mmol/L 103    Glucose      70 - 99 mg/dL 156 (H)    Alkaline Phosphatase      40 - 150 U/L 35 (L)    AST      0 - 45 U/L 27    ALT      0 - 70 U/L 31    Protein Total      6.4 - 8.3 g/dL 7.5    Albumin      3.5 - 5.2 g/dL 4.8    Bilirubin Total      <=1.2 mg/dL 0.6    Total Cholesterol      <=199 mg/dL 125    Triglycerides      30 - 149 mg/dL 145    HDL Cholesterol      40 - 59 mg/dL 37 (L)    LDL Cholesterol      <=129 mg/dL 59    HDL Size      >=8.9 nm 8.4 (L)    VLDL Size      <=46.7 nm 50.0 (H)    LDL Particle Size      >=20.7 nm 20.4 (L)    Lge HDL Particle number      >=4.2 umol/L <2.8 (L)    HDL Particle Number NMR      >=33.0 umol/L 29.7 (L)    Lge VLDL Part number      <=2.7 nmol/L 2.5    Small LDL Particle number      <=634 nmol/L 774 (H)    LDL Particle Number      <=1135 nmol/L 1091    EER LipoFit by NMR See Note    C-Reactive Protein High Sensitivity <0.15    Hemoglobin A1C      0.0 - 5.6 % 7.0 (H)    Lipoprotein (a)      <30 mg/dL 15    Free T3      2.0 - 4.4 pg/mL 2.7    T4 Free      0.90 - 1.70 ng/dL 1.07    TSH      0.30 - 4.20 uIU/mL 2.66    INR      0.85 - 1.15  1.01    PTT      22 - 38 Seconds 31       Legend:  (H) High  (L) Low       EKG: appears normal, NSR, normal axis, normal intervals, no acute ST/T changes c/w ischemia, no LVH by voltage criteria, unchanged from previous tracings    Revised Cardiac Risk Index (RCRI)  The patient has the following serious cardiovascular risks for perioperative  complications:   - No serious cardiac risks = 0 points     RCRI Interpretation: 0 points: Class I (very low risk - 0.4% complication rate)         Signed Electronically by: Abel Fernandez MD  Copy of this evaluation report is provided to requesting physician.

## 2024-03-27 NOTE — PROGRESS NOTES
"Patient is here to discuss PRE OP    /80 (BP Location: Right arm, Patient Position: Chair, Cuff Size: Adult Regular)   Pulse 94   Ht 5' 11.5\" (1.816 m)   Wt 193 lb 12.8 oz (87.9 kg)   SpO2 94%   BMI 26.65 kg/m      Questions patient would like addressed today are: N/A.    Refills are needed: No    Has homecare services and agency name:  [facility]    "

## 2024-05-10 ENCOUNTER — MYC MEDICAL ADVICE (OUTPATIENT)
Dept: OTHER | Facility: CLINIC | Age: 74
End: 2024-05-10
Payer: COMMERCIAL

## 2024-05-10 DIAGNOSIS — R35.1 NOCTURIA: ICD-10-CM

## 2024-05-10 DIAGNOSIS — H91.90 HEARING LOSS: Primary | ICD-10-CM

## 2024-05-10 NOTE — TELEPHONE ENCOUNTER
Please see patients mychart message below and requests for sleep study and ENT referral for hearing test and advise if ok to order referrals.    Nancy BORJAS, BENJAMIN    [facility]  Office: [phone]  Fax: [phone]

## 2024-05-10 NOTE — TELEPHONE ENCOUNTER
Does he want a sleep referral in the [place] system? Or did the urologist in [place] tell him he needs a sleep referral anywhere?

## 2024-06-04 ENCOUNTER — MYC REFILL (OUTPATIENT)
Dept: OTHER | Facility: CLINIC | Age: 74
End: 2024-06-04
Payer: COMMERCIAL

## 2024-06-04 DIAGNOSIS — E11.00 TYPE 2 DIABETES MELLITUS WITH HYPEROSMOLARITY WITHOUT COMA, WITHOUT LONG-TERM CURRENT USE OF INSULIN (H): ICD-10-CM

## 2024-06-04 RX ORDER — LIRAGLUTIDE 6 MG/ML
1.8 INJECTION SUBCUTANEOUS DAILY
Qty: 27 ML | Refills: 1 | Status: SHIPPED | OUTPATIENT
Start: 2024-06-04 | End: 2024-09-09

## 2024-06-04 NOTE — TELEPHONE ENCOUNTER
Patient requesting pharmacy change.  [facility].    Nancy BORJAS, RN    [facility]  Office: [phone]  Fax: [phone]

## 2024-07-11 ENCOUNTER — TRANSFERRED RECORDS (OUTPATIENT)
Dept: HEALTH INFORMATION MANAGEMENT | Facility: CLINIC | Age: 74
End: 2024-07-11
Payer: COMMERCIAL

## 2024-07-20 ASSESSMENT — SLEEP AND FATIGUE QUESTIONNAIRES
HOW LIKELY ARE YOU TO NOD OFF OR FALL ASLEEP WHEN YOU ARE A PASSENGER IN A CAR FOR AN HOUR WITHOUT A BREAK: SLIGHT CHANCE OF DOZING
HOW LIKELY ARE YOU TO NOD OFF OR FALL ASLEEP IN A CAR, WHILE STOPPED FOR A FEW MINUTES IN TRAFFIC: WOULD NEVER DOZE
HOW LIKELY ARE YOU TO NOD OFF OR FALL ASLEEP WHILE SITTING AND READING: SLIGHT CHANCE OF DOZING
HOW LIKELY ARE YOU TO NOD OFF OR FALL ASLEEP WHILE SITTING QUIETLY AFTER LUNCH WITHOUT ALCOHOL: WOULD NEVER DOZE
HOW LIKELY ARE YOU TO NOD OFF OR FALL ASLEEP WHILE LYING DOWN TO REST IN THE AFTERNOON WHEN CIRCUMSTANCES PERMIT: HIGH CHANCE OF DOZING
HOW LIKELY ARE YOU TO NOD OFF OR FALL ASLEEP WHILE WATCHING TV: SLIGHT CHANCE OF DOZING
HOW LIKELY ARE YOU TO NOD OFF OR FALL ASLEEP WHILE SITTING INACTIVE IN A PUBLIC PLACE: WOULD NEVER DOZE
HOW LIKELY ARE YOU TO NOD OFF OR FALL ASLEEP WHILE SITTING AND TALKING TO SOMEONE: WOULD NEVER DOZE

## 2024-07-25 ENCOUNTER — VIRTUAL VISIT (OUTPATIENT)
Dept: SLEEP MEDICINE | Facility: CLINIC | Age: 74
End: 2024-07-25
Attending: INTERNAL MEDICINE
Payer: COMMERCIAL

## 2024-07-25 VITALS — BODY MASS INDEX: 26.6 KG/M2 | WEIGHT: 190 LBS | HEIGHT: 71 IN

## 2024-07-25 DIAGNOSIS — G47.33 OBSTRUCTIVE SLEEP APNEA: Primary | ICD-10-CM

## 2024-07-25 DIAGNOSIS — R35.1 NOCTURIA: ICD-10-CM

## 2024-07-25 PROCEDURE — 99215 OFFICE O/P EST HI 40 MIN: CPT | Mod: 95 | Performed by: STUDENT IN AN ORGANIZED HEALTH CARE EDUCATION/TRAINING PROGRAM

## 2024-07-25 ASSESSMENT — PAIN SCALES - GENERAL: PAINLEVEL: NO PAIN (0)

## 2024-07-25 NOTE — PROGRESS NOTES
Virtual Visit Details  Type of service:  Video Visit   Video Start Time:  9:31 AM  Video End Time: 9:47 AM  Originating Location (pt. Location): Home  Distant Location (provider location):  Off-site  Platform used for Video Visit: [facility]  Consultation Note    Name: Dino Daniels MRN#: [id number]   Age: 73 year old YOB: 1950     Date of Consultation: 07/25/24  Consultation is requested by: Abel Fernandez  Primary care provider: Abel Fernandez MD    History of Present Illness:   Dino Daniels is a 73 year old male patient with HTN, T2DM, HLD, and dysfunctional voiding    He is sent by Abel Fernandez for a sleep consultation regarding Consult      Dino Daniels main reason for visit: My new urologist suggested a sleep test.  Patient states problem(s) started: When my urinary problems started more than a decade ago.  Dino Daniels's goals for this visit: Determine if part of my waking is because I am too light of a sleeper.    He has not had a previous sleep study.    CPAP: No    Assessment and Plan:     Problem List Items Addressed This Visit       Obstructive sleep apnea - Primary     STOP BANG score is 4/8. Patient likely has sleep apnea based on clinical history of EDS(ESS 6/24), HTN, age, gender, insomnia(RAFAEL 13/28), and nocturia.   Obstructive sleep apnea reviewed. Complications of Untreated Sleep Apnea Reviewed.  HST/ Polysomnography reviewed. Information provided regarding treatment options for CRISTAL.  Recommend HST to assess for sleep disordered breathing due to high pretest probability for CRISTAL            Relevant Orders    HST - Home Sleep Apnea Test  - WatchPat NonReturnable     Other Visit Diagnoses       Nocturia                SLEEP-WAKE SCHEDULE:   Work/School Days: Patient goes to school/work: Yes   Usually gets into bed at 9:30 PM  Takes patient about 10 minutes to fall asleep  Has trouble falling asleep once or twice a  month nights per week  Wakes up in the middle of the night 3-4 times.  Wakes up due to Use the bathroom  He has trouble falling back asleep 0 times a week.   It usually takes 5 minutes to get back to sleep  Patient is usually up at 4:30 AM  Uses alarm: No  Sleep Inertia: No    Weekends/Non-work Days/All Other Days:  Usually gets into bed at 9:30 PM   Takes patient about 5 - 10 minutes to fall asleep  Patient is usually up at 5:00 AM  Uses alarm: No    Sleep Need  Patient gets  7 hour/night + daily 30 minute nap sleep on average   Patient thinks He needs about 8 hours sleep    Dino Daniels prefers to sleep in this position(s): Side   Patient states they do the following activities in bed:      Naps  Patient takes a purposeful nap 7 times a week and naps are usually 20 - 60 minuyes in duration  He feels better after a nap: Yes  He dozes off unintentionally 0 days per week  Patient has had a driving accident or near-miss due to sleepiness/drowsiness: No      SLEEP DISRUPTIONS:  Breathing/Snoring  Patient snores:No  Other people complain about His snoring: No  Patient has been told He stops breathing in His sleep:No  He has issues with the following: Getting up to urinate more than once    Movement:  Patient gets pain, discomfort, with an urge to move:  Yes  It happens when He is resting:  No  It happens more at night:  No  Patient has been told Dino Daniels kicks His legs at night:  No     Behaviors in Sleep:  Dino Daniels has experienced the following behaviors while sleeping: Teeth grinding  He has experienced sudden muscle weakness during the day: No    Is there anything else you would like your sleep provider to know: I wear a mouthguard at night.      CAFFEINE AND OTHER SUBSTANCES:  Patient consumes caffeinated beverages per day:  2 - 3  Last caffeine use is usually: 3 PM  List of any prescribed or over the counter stimulants that patient takes: no  List of any prescribed or over the counter sleep  medication patient takes: Ambien prn  List of previous sleep medications that patient has tried:    Patient drinks alcohol to help them sleep: No  Patient drinks alcohol near bedtime: No    Family History:  Patient has a family member been diagnosed with a sleep disorder: Yes  brother sleep apnea         SCALES:  EPWORTH SLEEPINESS SCALE       7/20/2024     4:26 PM    [place] Sleepiness Scale ( THOMAS Marion  [id number]<br>ESS - [place]/English - Final version - 21 Nov 07 - [facility] [employer] [place].)   Sitting and reading Slight chance of dozing   Watching TV Slight chance of dozing   Sitting, inactive in a public place (e.g. a theatre or a meeting) Would never doze   As a passenger in a car for an hour without a break Slight chance of dozing   Lying down to rest in the afternoon when circumstances permit High chance of dozing   Sitting and talking to someone Would never doze   Sitting quietly after a lunch without alcohol Would never doze   In a car, while stopped for a few minutes in traffic Would never doze   [place] Score (MC) 6   [place] Score (Sleep) 6       INSOMNIA SEVERITY INDEX (RAFAEL)        7/20/2024     4:10 PM   Insomnia Severity Index (RAFAEL)   Difficulty falling asleep 0   Difficulty staying asleep 2   Problems waking up too early 0   How SATISFIED/DISSATISFIED are you with your CURRENT sleep pattern? 3   How NOTICEABLE to others do you think your sleep problem is in terms of impairing the quality of your life? 3   How WORRIED/DISTRESSED are you about your current sleep problem? 3   To what extent do you consider your sleep problem to INTERFERE with your daily functioning (e.g. daytime fatigue, mood, ability to function at work/daily chores, concentration, memory, mood, etc.) CURRENTLY? 2   RAFAEL Total Score 13    Guidelines for Scoring/Interpretation:  Total score categories:  0-7 = No clinically significant insomnia   8-14 = Subthreshold insomnia   15-21 = Clinical insomnia (moderate severity)  22-28 = Clinical  "insomnia (severe)  Used via courtesy of www.myhealth.va.gov with permission from Austen Chilel PhD., [facility]      STOP BANG   CRISTAL Medium Risk             Total Score: 4    CRISTAL: Often tired    CRISTAL: Hypertension    CRISTAL: Over 50 ys old    CRISTAL: Male          GAD7       No data to display                  CAGE-AID       No data to display              CAGE-AID reprinted with permission from the [place] Medical Journal, SUPA Escobedo. and OZ Rowland, \"Conjoint screening questionnaires for alcohol and drug abuse\" [place] Medical Journal 94: 135-140, 1995.      PATIENT HEALTH QUESTIONNAIRE-9 (PHQ - 9)      12/20/2016     1:51 PM   PHQ-9 ([employer])   1.  Little interest or pleasure in doing things 0   2.  Feeling down, depressed, or hopeless 0   3.  Trouble falling or staying asleep, or sleeping too much 0   4.  Feeling tired or having little energy 2   5.  Poor appetite or overeating 2   6.  Feeling bad about yourself - or that you are a failure or have let yourself or your family down 0   7.  Trouble concentrating on things, such as reading the newspaper or watching television 0   8.  Moving or speaking so slowly that other people could have noticed. Or the opposite - being so fidgety or restless that you have been moving around a lot more than usual 0   9.  Thoughts that you would be better off dead, or of hurting yourself in some way 0   PHQ-9 Total Score 4   If you checked off any problems, how difficult have these problems made it for you to do your work, take care of things at home, or get along with other people? Not difficult at all   6.  Feeling bad about yourself 0   7.  Trouble concentrating 0   8.  Moving slowly or restless 0   9.  Suicidal or self-harm thoughts 0   Difficulty at work, home, or with people Not difficult at all     Developed by Ira Ba, Christi Cleary, Ilya Olivarez and colleagues, with an educational srikanth from [employer] No permission required to reproduce, " translate, display or distribute.      Allergies:    Allergies   Allergen Reactions    No Known Drug Allergy        Medications:    Current Outpatient Medications   Medication Sig Dispense Refill    aspirin (ASA) 81 MG EC tablet Take 1 tablet (81 mg) by mouth daily 90 tablet 3    blood glucose (CONTOUR NEXT TEST) test strip TEST 4 TIMES DAILY 400 strip 3    blood glucose calibration (CONTOUR NEXT CONTROL LOW VI SOLN) Low solution Use to calibrate blood glucose monitor as directed. 1 Bottle 1    blood glucose calibration (CONTOUR NEXT CONTROL NORMAL VI SOLN) Normal solution Use to calibrate blood glucose monitor as needed as directed. 1 Bottle 1    blood glucose monitoring (CONTOUR NEXT MONITOR W/DEVICE KIT) meter device kit Use to test blood sugar 4 times daily or as directed. 1 kit 1    empagliflozin (JARDIANCE) 25 MG TABS tablet Take 1 tablet (25 mg) by mouth daily 90 tablet 3    glimepiride (AMARYL) 4 MG tablet Take 1 tablet (4 mg) by mouth 2 times daily (before meals) 180 tablet 3    insulin pen needle (32G X 6 MM) 32G X 6 MM miscellaneous Use 1pen needle daily or as directed. 30 each 3    KRILL OIL OR None Entered      liraglutide (VICTOZA PEN) 18 MG/3ML solution Inject 1.8 mg Subcutaneous daily 27 mL 1    lisinopril (ZESTRIL) 20 MG tablet Take 1 tablet (20 mg) by mouth daily 90 tablet 3    metFORMIN (GLUCOPHAGE) 1000 MG tablet Take 1 tablet (1,000 mg) by mouth 2 times daily (with meals) 180 tablet 2    Microlet Lancets MISC Use to test blood sugar 4 times daily. 300 each 3    MULTIPLE VITAMINS CAPS   OR 1 capsule qd       ONETOUCH DELICA LANCETS 33G MISC TEST 4 TIMES DAILY 300 each 1    zolpidem (AMBIEN) 10 MG tablet TAKE 1 TABLET BY MOUTH AT BEDTIME AS NEEDED FOR SLEEP 10 tablet 5    rosuvastatin (CRESTOR) 40 MG tablet Take 1 tablet (40 mg) by mouth daily 90 tablet 3    tamsulosin (FLOMAX) 0.4 MG capsule Take 1 capsule (0.4 mg) by mouth daily 90 capsule 1       Problem List:  Patient Active Problem List     Diagnosis Date Noted    Obstructive sleep apnea 07/25/2024     Priority: Medium    Major depressive disorder, single episode in full remission (H24) 12/03/2015     Priority: Medium    Type 2 diabetes, HbA1c goal < 7% (H) 07/16/2012     Priority: Medium    HYPERLIPIDEMIA LDL GOAL <100 10/31/2010     Priority: Medium    Pain in joint, ankle and foot 10/26/2007     Priority: Medium        Past Medical/Surgical History:  Past Medical History:   Diagnosis Date    Arthritis     knees, left ankle and right thumb    Bladder stone     DEPRESSIVE DISORDER NEC 10/9/2006    resolved in 2009    Hypertension     Mumps     Other and unspecified hyperlipidemia     abstracted 7/17/02.    Type II or unspecified type diabetes mellitus without mention of complication, not stated as uncontrolled      Past Surgical History:   Procedure Laterality Date    LASER HOLMIUM LITHOTRIPSY BLADDER  6/22/2012    Procedure: LASER HOLMIUM LITHOTRIPSY BLADDER;  Cystoscopy with Removal of Bladder Stone with Holmium Laser;  Surgeon: Chaz Cobb MD;  Location: [facility] OR    TESTICLE SURGERY      VASECTOMY      VASECTOMY      wisdom teeth[         Social History:  Social History     Socioeconomic History    Marital status:      Spouse name: Not on file    Number of children: Not on file    Years of education: Not on file    Highest education level: Not on file   Occupational History    Not on file   Tobacco Use    Smoking status: Never    Smokeless tobacco: Never   Substance and Sexual Activity    Alcohol use: Not Currently     Comment: rarely    Drug use: No    Sexual activity: Not on file   Other Topics Concern    Parent/sibling w/ CABG, MI or angioplasty before 65F 55M? Not Asked   Social History Narrative    Not on file     Social Determinants of Health     Financial Resource Strain: Low Risk  (6/29/2023)    [facility]    Overall Financial Resource Strain (CARDIA)     Difficulty of Paying Living Expenses: Not  hard at all   Food Insecurity: No Food Insecurity (2023)    Received from [facility]    Hunger Vital Sign     Worried About Running Out of Food in the Last Year: Never true     Ran Out of Food in the Last Year: Never true   Transportation Needs: No Transportation Needs (2023)    Received from [facility]    PRAPARE - Transportation     Lack of Transportation (Medical): No     Lack of Transportation (Non-Medical): No   Physical Activity: Patient Declined (2023)    Received from [facility]    Exercise Vital Sign     Days of Exercise per Week: Patient declined     Minutes of Exercise per Session: Patient declined   Stress: Not on file   Social Connections: Not on file   Interpersonal Safety: Patient Declined (2023)    Received from [facility]    Humiliation, Afraid, Rape, and Kick questionnaire     Fear of Current or Ex-Partner: Patient declined     Emotionally Abused: Patient declined     Physically Abused: Patient declined     Sexually Abused: Patient declined   Housing Stability: Low Risk  (2023)    Received from [facility]    Housing Stability     What is your living situation today?: I have a steady place to live       Family History:  Family History   Problem Relation Age of Onset     () Father          age 56 leukemia    Cerebrovascular Disease Mother      () Brother         b,     () Sister         b, back problems     () Brother         b,1955 back problems       Review of Systems:   A complete review of systems reviewed by me is negative with the exeption of what has been mentioned in the history of present illness.  In the last TWO WEEKS have you experienced any of the following symptoms?  Fevers: No  Night Sweats: No  Weight Gain: No  Pain at Night: No  Double Vision: No  Changes in Vision: No  Difficulty Breathing through Nose: No  Sore Throat in Morning: No  Dry Mouth in the Morning: No  Shortness  "of Breath Lying Flat: No  Shortness of Breath With Activity: No  Awakening with Shortness of Breath: No  Increased Cough: No  Heart Racing at Night: No  Swelling in Feet or Legs: Yes  Diarrhea at Night: No  Heartburn at Night: No  Urinating More than Once at Night: Yes  Losing Control of Urine at Night: No  Joint Pains at Night: Yes  Headaches in Morning: No  Weakness in Arms or Legs: No  Depressed Mood: No  Anxiety: No     Physical Exam:   Vitals: Ht 1.803 m (5' 11\")   Wt 86.2 kg (190 lb)   BMI 26.50 kg/m    BMI= Body mass index is 26.5 kg/m .     GENERAL: alert and no distress  EYES: Eyes grossly normal to inspection.  No discharge or erythema, or obvious scleral/conjunctival abnormalities.  RESP: No audible wheeze, cough, or visible cyanosis.    SKIN: Visible skin clear. No significant rash, abnormal pigmentation or lesions.  NEURO: Cranial nerves grossly intact.  Mentation and speech appropriate for age.  PSYCH: Appropriate affect, tone, and pace of words         Data: All pertinent previous laboratory data reviewed     Recent Labs   Lab Test 03/13/24  0811 09/13/23  0736    139   POTASSIUM 4.9 4.9   CHLORIDE 103 103   CO2 23 25   ANIONGAP 13 11   * 197*   BUN 27.2* 25.1*   CR 1.08 1.21*   JEFERSON 9.9 10.0       Recent Labs   Lab Test 03/13/24  0811   WBC 5.6   RBC 5.10   HGB 15.6   HCT 46.0   MCV 90   MCH 30.6   MCHC 33.9   RDW 13.9          Recent Labs   Lab Test 03/13/24  0811   PROTTOTAL 7.5   ALBUMIN 4.8   BILITOTAL 0.6   ALKPHOS 35*   AST 27   ALT 31       TSH   Date Value   03/13/2024 2.66 uIU/mL   06/06/2023 2.06 uIU/mL   11/02/2022 2.21 mU/L   04/27/2022 1.39 mU/L   12/08/2020 2.22 mU/L   07/29/2020 2.19 mU/L       No results found for: \"UAMP\", \"UBARB\", \"BENZODIAZEUR\", \"UCANN\", \"UCOC\", \"OPIT\", \"UPCP\"    No results found for: \"IRONSAT\", \"CU15929\", \"NEIL\"    No results found for: \"PH\", \"PHARTERIAL\", \"PO2\", \"DZ1AEKANMEG\", \"SAT\", \"PCO2\", \"HCO3\", \"BASEEXCESS\", \"ZAHRAA\", " "\"BEB\"    @LABRCNTIPR(phv:4,pco2v:4,po2v:4,hco3v:4,bradley:4,o2per:4)@    Echocardiology: No results found for this or any previous visit (from the past 4320 hour(s)).    Chest x-ray:   No results found for this or any previous visit from the past 365 days.      Chest CT:   No results found for this or any previous visit from the past 365 days.      PFT: Most Recent Breeze Pulmonary Function Testing  No results found    Patient was strongly advised to avoid driving, operating any heavy machinery or other hazardous situations while drowsy or sleepy.  Patient was counseled on the importance of driving while alert, to pull over if drowsy, or nap before getting into the vehicle if sleepy.      Plan is for Dino Daniels to follow up following HST.     The above note was dictated using voice recognition software. Although reviewed after completion, some word and grammatical error may remain . Please contact the author for any clarifications.    Total time spent reviewing medical records, history and physical examination, review of previous testing and interpretation as well as documentation on this date, 07/25/24: 45 minutes    Chichi Celeste MD on 10/20/2022   [facility]   [address], [address]   [address] [place] [address]. [place] [address]   Appointments: [phone]     CC: Abel Fernandez  "

## 2024-07-25 NOTE — PATIENT INSTRUCTIONS
"MY INFORMATION ON SLEEP APNEA-  Dino Daniels    DOCTOR : Chichi Celeste MD  SLEEP CENTER :      MY CONTACT NUMBER:    [facility]   [phone]  [facility]      Am I having a home sleep study?  Disposable device sent out require phone/computer application, [employer]: https://[email or web address].com/watch?v=BCce_vbiwxE  The sleep lab will call you for this appointment   If you wish to reschedule the sleep study or contact the sleep lab scheduling call [phone]        Frequently asked questions:  1. What is Obstructive Sleep Apnea (CRISTAL)? CRISTAL is the most common type of sleep apnea. Apnea means, \"without breath.\"  Apnea is most often caused by narrowing or collapse of the upper airway as muscles relax during sleep.   Almost everyone has occasional apneas. Most people with sleep apnea have had brief interruptions at night frequently for many years.  The severity of sleep apnea is related to how frequent and severe the events are.   Apneas are any events where there is no breathing.  Hypopneas are any events where there is shallow breathing. Sleep studies will track and then add all of the apneas and hypopneas into a total and then divided this number by the total time asleep to obtain the apnea hypopnea index(AHI). 0-5 events/per hour = No Sleep Apnea; 5-15 events/per hour = Mild Sleep Apnea; 15-30 events/per hour = Moderate Sleep Apnea; Greater than 30 events/per hour = Severe Sleep Apnea.  Sleep apnea is typically worse during REM sleep due to complete muscle relaxation, including the muscles of the airway. Sleep apnea is also typically worse during supine(sleeping on your back) sleep due to internal structures more easily falling on the top of the airway and external pressures more easily crushing the airway.  The respiratory disturbance index(RDI) includes apneas, hypopneas, and other respiratory events such as snoring that may disturb your sleep.  2. What are the " consequences of CRISTAL? Symptoms include: feeling sleepy during the day, snoring loudly, gasping or stopping of breathing, trouble sleeping, and occasionally morning headaches or heartburn at night.  Sleepiness can be serious and even increase the risk of falling asleep while driving. Other health consequences may include development of high blood pressure and other cardiovascular disease in persons who are susceptible. Untreated CRISTAL  can contribute to heart disease, stroke and diabetes.   3. What are the treatment options? In most situations, sleep apnea is a lifelong disease that must be managed with daily therapy. Medications are not effective for sleep apnea and surgery is generally not considered until other therapies have been tried. Your treatment is your choice . Continuous Positive Airway (CPAP) works right away and is the therapy that is effective in nearly everyone. An oral device to hold your jaw forward is usually the next most reliable option. Other options include postioning devices (to keep you off your back), weight loss, and surgery including a tongue pacing device. There is more detail about some of these options below.    Important tips for using CPAP and similar devices   Know your equipment:  CPAP is continuous positive airway pressure that prevents obstructive sleep apnea by keeping the throat from collapsing while you are sleeping. In most cases, the device is  smart  and can slowly self-adjusts if your throat collapses and keeps a record every day of how well you are treated-this information is available to you and your care team.  BPAP is bilevel positive airway pressure that keeps your throat open and also assists each breath with a pressure boost to maintain adequate breathing.  Special kinds of BPAP are used in patients who have inadequate breathing from lung or heart disease. In most cases, the device is  smart  and can slowly self-adjusts to assist breathing. Like CPAP, the device keeps a  record of how well you are treated.  Your mask is your connection to the device. You get to choose what feels most comfortable and the staff will help to make sure if fits. Here: are some examples of the different masks that are available:       Key points to remember on your journey with sleep apnea:  Sleep study.  PAP devices often need to be adjusted during a sleep study to show that they are effective and adjusted right.  Good tips to remember: Try wearing just the mask during a quiet time during the day so your body adapts to wearing it. A humidifier is recommended for comfort in most cases to prevent drying of your nose and throat. Allergy medication from your provider may help you if you are having nasal congestion.  Getting settled-in. It takes more than one night for most of us to get used to wearing a mask. Try wearing just the mask during a quiet time during the day so your body adapts to wearing it. A humidifier is recommended for comfort in most cases. Our team will work with you carefully on the first day and will be in contact within 4 days and again at 2 and 4 weeks for advice and remote device adjustments. Your therapy is evaluated by the device each day.   Use it every night. The more you are able to sleep naturally for 7-8 hours, the more likely you will have good sleep and to prevent health risks or symptoms from sleep apnea. Even if you use it 4 hours it helps. Occasionally all of us are unable to use a medical therapy, in sleep apnea, it is not dangerous to miss one night.   Communicate. Call our skilled team on the number provided on the first day if your visit for problems that make it difficult to wear the device. Over 2 out of 3 patients can learn to wear the device long-term with help from our team. Remember to call our team or your sleep providers if you are unable to wear the device as we may have other solutions for those who cannot adapt to mask CPAP therapy. It is recommended that you  sleep your sleep provider within the first 3 months and yearly after that if you are not having problems.   Take care of your equipment. Make sure you clean your mask and tubing using directions every day and that your filter and mask are replaced as recommended or if they are not working.     BESIDES CPAP, WHAT OTHER THERAPIES ARE THERE?    Positioning Device  Positioning devices are generally used when sleep apnea is mild and only occurs on your back.This example shows a pillow that straps around the waist. It may be appropriate for those whose sleep study shows milder sleep apnea that occurs primarily when lying flat on one's back. Preliminary studies have shown benefit but effectiveness at home may need to be verified by a home sleep test. These devices are generally not covered by medical insurance.    Oral Appliance  What is oral appliance therapy?  An oral appliance device fits on your teeth at night like a retainer used after having braces. The device is made by a specialized dentist and requires several visits over 1-2 months before a manufactured device is made to fit your teeth and is adjusted to prevent your sleep apnea. Once an oral device is working properly, snoring should be improved. A home sleep test may be recommended at that time if to determine whether the sleep apnea is adequately treated.       Some things to remember:  -Oral devices are often, but not always, covered by your medical insurance. Be sure to check with your insurance provider.   -If you are referred for oral therapy, you will be given a list of specialized dentists to consider or you may choose to visit the Web site of the American Academy of Dental Sleep Medicine  -Oral devices are less likely to work if you have severe sleep apnea or are extremely overweight.     More detailed information  An oral appliance is a small acrylic device that fits over the upper and lower teeth  (similar to a retainer or a mouth guard). This device  slightly moves jaw forward, which moves the base of the tongue forward, opens the airway, improves breathing for effective treat snoring and obstructive sleep apnea in perhaps 7 out of 10 people .  The best working devices are custom-made by a dental device  after a mold is made of the teeth 1, 2, 3.  When is an oral appliance indicated?  Oral appliance therapy is recommended as a first-line treatment for patients with primary snoring, mild sleep apnea, and for patients with moderate sleep apnea who prefer appliance therapy to use of CPAP4, 5. Severity of sleep apnea is determined by sleep testing and is based on the number of respiratory events per hour of sleep.   How successful is oral appliance therapy?  The success rate of oral appliance therapy in patients with mild sleep apnea is 75-80% while in patients with moderate sleep apnea it is 50-70%. The chance of success in patients with severe sleep apnea is 40-50%. The research also shows that oral appliances have a beneficial effect on the cardiovascular health of CRISTAL patients at the same magnitude as CPAP therapy7.  Oral appliances should be a second-line treatment in cases of severe sleep apnea, but if not completely successful then a combination therapy utilizing CPAP plus oral appliance therapy may be effective. Oral appliances tend to be effective in a broad range of patients although studies show that the patients who have the highest success are females, younger patients, those with milder disease, and less severe obesity. 3, 6.   Finding a dentist that practices dental sleep medicine  Specific training is available through the American Academy of Dental Sleep Medicine for dentists interested in working in the field of sleep. To find a dentist who is educated in the field of sleep and the use of oral appliances, near you, visit the Web site of the American Academy of Dental Sleep Medicine.    References  1. mariangel Sue al. Objectively  measured vs self-reported compliance during oral appliance therapy for sleep-disordered breathing. Chest 2013; 144(5): 4019-0075.  2. Miles, et al. Objective measurement of compliance during oral appliance therapy for sleep-disordered breathing. Thorax 2013; 68(1): 91-96.  3. Laura et al. Mandibular advancement devices in 620 men and women with CRISTAL and snoring: tolerability and predictors of treatment success. Chest 2004; 125: 8576-5929.  4. Ryder et al. Oral appliances for snoring and CRISTAL: a review. Sleep 2006; 29: 244-262.  5. Jimbo et al. Oral appliance treatment for CRISTAL: an update. J Clin Sleep Med 2014; 10(2): 215-227.  6. Kasey et al. Predictors of OSAH treatment outcome. J Dent Res 2007; 86: 3367-6238.      Weight Loss:    Weight loss is a long-term strategy that may improve sleep apnea in some patients.    Weight management is a personal decision.  If you are interested in exploring weight loss strategies, the following discussion covers the impact on weight loss on sleep apnea and the approaches that may be successful.    Weight loss decreases severity of sleep apnea in most people with obesity. For those with mild obesity who have developed snoring with weight gain, even 15-30 pound weight loss can improve and occasionally eliminate sleep apnea.  Structured and life-long dietary and health habits are necessary to lose weight and keep healthier weight levels.     Though there may be significant health benefits from weight loss, long-term weight loss is very difficult to achieve- studies show success with dietary management in less than 10% of people. In addition, substantial weight loss may require years of dietary control and may be difficult if patients have severe obesity. In these cases, surgical management may be considered.  Finally, older individuals who have tolerated obesity without health complications may be less likely to benefit from weight loss strategies.    Your BMI is  Body mass index is 26.5 kg/m .    Weight management plan: Discussed healthy diet and exercise guidelines    Surgery:    Surgery for obstructive sleep apnea is considered generally only when other therapies fail to work. Surgery may be discussed with you if you are having a difficult time tolerating CPAP and or when there is an abnormal structure that requires surgical correction.  Nose and throat surgeries often enlarge the airway to prevent collapse.  Most of these surgeries create pain for 1-2 weeks and up to half of the most common surgeries are not effective throughout life.  You should carefully discuss the benefits and drawbacks to surgery with your sleep provider and surgeon to determine if it is the best solution for you.   More information  Surgery for CRISTAL is directed at areas that are responsible for narrowing or complete obstruction of the airway during sleep.  There are a wide range of procedures available to enlarge and/or stabilize the airway to prevent blockage of breathing in the three major areas where it can occur: the palate, tongue, and nasal regions.  Successful surgical treatment depends on the accurate identification of the factors responsible for obstructive sleep apnea in each person.  A personalized approach is required because there is no single treatment that works well for everyone.  Because of anatomic variation, consultation with an examination by a sleep surgeon is a critical first step in determining what surgical options are best for each patient.  In some cases, examination during sedation may be recommended in order to guide the selection of procedures.  Patients will be counseled about risks and benefits as well as the typical recovery course after surgery. Surgery is typically not a cure for a person s CRISTAL.  However, surgery will often significantly improve one s CRISTAL severity (termed  success rate ).  Even in the absence of a cure, surgery will decrease the cardiovascular risk  associated with OSA7; improve overall quality of life8 (sleepiness, functionality, sleep quality, etc).      Palate Procedures:  Patients with CRISTAL often have narrowing of their airway in the region of their tonsils and uvula.  The goals of palate procedures are to widen the airway in this region as well as to help the tissues resist collapse.  Modern palate procedure techniques focus on tissue conservation and soft tissue rearrangement, rather than tissue removal.  Often the uvula is preserved in this procedure. Residual sleep apnea is common in patient after pharyngoplasty with an average reduction in sleep apnea events of 33%2.      Tongue Procedures:  ExamWhile patients are awake, the muscles that surround the throat are active and keep this region open for breathing. These muscles relax during sleep, allowing the tongue and other structures to collapse and block breathing.  There are several different tongue procedures available.  Selection of a tongue base procedure depends on characteristics seen on physical exam.  Generally, procedures are aimed at removing bulky tissues in this area or preventing the back of the tongue from falling back during sleep.  Success rates for tongue surgery range from 50-62%3.    Hypoglossal Nerve Stimulation:  Hypoglossal nerve stimulation has recently received approval from the [place] Food and Drug Administration for the treatment of obstructive sleep apnea.  This is based on research showing that the system was safe and effective in treating sleep apnea6.  Results showed that the median AHI score decreased 68%, from 29.3 to 9.0. This therapy uses an implant system that senses breathing patterns and delivers mild stimulation to airway muscles, which keeps the airway open during sleep.  The system consists of three fully implanted components: a small generator (similar in size to a pacemaker), a breathing sensor, and a stimulation lead.  Using a small handheld remote, a  patient turns the therapy on before bed and off upon awakening.    Candidates for this device must be greater than 22 years of age, have moderate to severe CRISTAL (AHI between 20-65), BMI less than 32, have tried CPAP/oral appliance without success, and have appropriate upper airway anatomy (determined by a sleep endoscopy performed by Dr. Otoole).    Hypoglossal Nerve Stimulation Pathway:    The sleep surgeon s office will work with the patient through the insurance prior-authorization process (including communications and appeals).    Nasal Procedures:  Nasal obstruction can interfere with nasal breathing during the day and night.  Studies have shown that relief of nasal obstruction can improve the ability of some patients to tolerate positive airway pressure therapy for obstructive sleep apnea1.  Treatment options include medications such as nasal saline, topical corticosteroid and antihistamine sprays, and oral medications such as antihistamines or decongestants. Non-surgical treatments can include external nasal dilators for selected patients. If these are not successful by themselves, surgery can improve the nasal airway either alone or in combination with these other options.      Combination Procedures:  Combination of surgical procedures and other treatments may be recommended, particularly if patients have more than one area of narrowing or persistent positional disease.  The success rate of combination surgery ranges from 66-80%2,3.    References  Akira BARRERA. The Role of the Nose in Snoring and Obstructive Sleep Apnoea: An Update.  Eur Arch Otorhinolaryngol. 2011; 268: 1365-73.   Ivan SM; Shea JA; Juan JR; Pallanch JF; Leah MB; Casey SG; Ebony HEART. Surgical modifications of the upper airway for obstructive sleep apnea in adults: a systematic review and meta-analysis. SLEEP 2010;33(10):8909-9549. Danyel ARENAS. Hypopharyngeal surgery in obstructive sleep apnea: an evidence-based medicine review.  Arch  Otolaryngol Head Neck Surg. 2006 Feb;132(2):206-13.  Daquan YH1, Ry Y, Darian JOSEPH. The efficacy of anatomically based multilevel surgery for obstructive sleep apnea. Otolaryngol Head Neck Surg. 2003 Oct;129(4):327-35.  Danyel ARENAS, Goldberg A. Hypopharyngeal Surgery in Obstructive Sleep Apnea: An Evidence-Based Medicine Review. Arch Otolaryngol Head Neck Surg. 2006 Feb;132(2):206-13.  Chiara HENRY et al. Upper-Airway Stimulation for Obstructive Sleep Apnea.  N Engl J Med. 2014 Jan 9;370(2):139-49.  Jordon Y et al. Increased Incidence of Cardiovascular Disease in Middle-aged Men with Obstructive Sleep Apnea. Am J Respir Crit Care Med; 2002 166: 159-165  Gastonandrew OLSEN et al. Studying Life Effects and Effectiveness of Palatopharyngoplasty (SLEEP) study: Subjective Outcomes of Isolated Uvulopalatopharyngoplasty. Otolaryngol Head Neck Surg. 2011; 144: 623-631.

## 2024-07-25 NOTE — ASSESSMENT & PLAN NOTE
STOP BANG score is 4/8. Patient likely has sleep apnea based on clinical history of EDS(ESS 6/24), HTN, age, gender, insomnia(RAFAEL 13/28), and nocturia.   Obstructive sleep apnea reviewed. Complications of Untreated Sleep Apnea Reviewed.  HST/ Polysomnography reviewed. Information provided regarding treatment options for CRISTAL.  Recommend HST to assess for sleep disordered breathing due to high pretest probability for CRISTAL

## 2024-09-09 ENCOUNTER — MYC REFILL (OUTPATIENT)
Dept: OTHER | Facility: CLINIC | Age: 74
End: 2024-09-09
Payer: COMMERCIAL

## 2024-09-09 DIAGNOSIS — E11.00 TYPE 2 DIABETES MELLITUS WITH HYPEROSMOLARITY WITHOUT COMA, WITHOUT LONG-TERM CURRENT USE OF INSULIN (H): ICD-10-CM

## 2024-09-10 RX ORDER — LIRAGLUTIDE 6 MG/ML
1.8 INJECTION SUBCUTANEOUS DAILY
Qty: 27 ML | Refills: 1 | Status: SHIPPED | OUTPATIENT
Start: 2024-09-10

## 2024-09-10 RX ORDER — PEN NEEDLE, DIABETIC 32 GX 1/4"
NEEDLE, DISPOSABLE MISCELLANEOUS
Qty: 100 EACH | Refills: 2 | Status: SHIPPED | OUTPATIENT
Start: 2024-09-10

## 2024-09-10 NOTE — TELEPHONE ENCOUNTER
Prescription approved per [facility] Refill Protocol.  Maryjane Sanders, KAYODEN, RN, CV-[facility]

## 2024-09-10 NOTE — TELEPHONE ENCOUNTER
Prescription approved per [facility] Refill Protocol.  Maryjane Sanders, KAYODEN, RN, CV-[facility]

## 2024-09-20 ENCOUNTER — LAB (OUTPATIENT)
Dept: LAB | Facility: CLINIC | Age: 74
End: 2024-09-20
Payer: COMMERCIAL

## 2024-09-20 DIAGNOSIS — E11.00 TYPE 2 DIABETES MELLITUS WITH HYPEROSMOLARITY WITHOUT COMA, WITHOUT LONG-TERM CURRENT USE OF INSULIN (H): ICD-10-CM

## 2024-09-20 DIAGNOSIS — I10 ESSENTIAL HYPERTENSION WITH GOAL BLOOD PRESSURE LESS THAN 130/80: ICD-10-CM

## 2024-09-20 DIAGNOSIS — E78.5 HYPERLIPIDEMIA LDL GOAL <70: ICD-10-CM

## 2024-09-20 LAB
ALBUMIN SERPL BCG-MCNC: 4.7 G/DL (ref 3.5–5.2)
ALP SERPL-CCNC: 42 U/L (ref 40–150)
ALT SERPL W P-5'-P-CCNC: 27 U/L (ref 0–70)
ANION GAP SERPL CALCULATED.3IONS-SCNC: 14 MMOL/L (ref 7–15)
APO A-I SERPL-MCNC: 25 MG/DL
AST SERPL W P-5'-P-CCNC: 25 U/L (ref 0–45)
BASOPHILS # BLD AUTO: 0 10E3/UL (ref 0–0.2)
BASOPHILS NFR BLD AUTO: 0 %
BILIRUB SERPL-MCNC: 0.6 MG/DL
BUN SERPL-MCNC: 24 MG/DL (ref 8–23)
CALCIUM SERPL-MCNC: 10.2 MG/DL (ref 8.8–10.4)
CHLORIDE SERPL-SCNC: 102 MMOL/L (ref 98–107)
CREAT SERPL-MCNC: 1.11 MG/DL (ref 0.67–1.17)
CREAT UR-MCNC: 89.4 MG/DL
CRP SERPL HS-MCNC: 8.49 MG/L
EGFRCR SERPLBLD CKD-EPI 2021: 70 ML/MIN/1.73M2
EOSINOPHIL # BLD AUTO: 0.1 10E3/UL (ref 0–0.7)
EOSINOPHIL NFR BLD AUTO: 1 %
ERYTHROCYTE [DISTWIDTH] IN BLOOD BY AUTOMATED COUNT: 13.4 % (ref 10–15)
EST. AVERAGE GLUCOSE BLD GHB EST-MCNC: 171 MG/DL
GLUCOSE SERPL-MCNC: 135 MG/DL (ref 70–99)
HBA1C MFR BLD: 7.6 % (ref 0–5.6)
HCO3 SERPL-SCNC: 24 MMOL/L (ref 22–29)
HCT VFR BLD AUTO: 44.7 % (ref 40–53)
HGB BLD-MCNC: 15.4 G/DL (ref 13.3–17.7)
IMM GRANULOCYTES # BLD: 0 10E3/UL
IMM GRANULOCYTES NFR BLD: 0 %
LYMPHOCYTES # BLD AUTO: 2.2 10E3/UL (ref 0.8–5.3)
LYMPHOCYTES NFR BLD AUTO: 37 %
MCH RBC QN AUTO: 30.4 PG (ref 26.5–33)
MCHC RBC AUTO-ENTMCNC: 34.5 G/DL (ref 31.5–36.5)
MCV RBC AUTO: 88 FL (ref 78–100)
MICROALBUMIN UR-MCNC: <12 MG/L
MICROALBUMIN/CREAT UR: NORMAL MG/G{CREAT}
MONOCYTES # BLD AUTO: 0.6 10E3/UL (ref 0–1.3)
MONOCYTES NFR BLD AUTO: 10 %
NEUTROPHILS # BLD AUTO: 3.1 10E3/UL (ref 1.6–8.3)
NEUTROPHILS NFR BLD AUTO: 52 %
PLATELET # BLD AUTO: 145 10E3/UL (ref 150–450)
POTASSIUM SERPL-SCNC: 5.2 MMOL/L (ref 3.4–5.3)
PROT SERPL-MCNC: 8.1 G/DL (ref 6.4–8.3)
RBC # BLD AUTO: 5.06 10E6/UL (ref 4.4–5.9)
SODIUM SERPL-SCNC: 140 MMOL/L (ref 135–145)
T3FREE SERPL-MCNC: 2.6 PG/ML (ref 2–4.4)
T4 FREE SERPL-MCNC: 1.23 NG/DL (ref 0.9–1.7)
TSH SERPL DL<=0.005 MIU/L-ACNC: 2.22 UIU/ML (ref 0.3–4.2)
WBC # BLD AUTO: 6 10E3/UL (ref 4–11)

## 2024-09-20 PROCEDURE — 36415 COLL VENOUS BLD VENIPUNCTURE: CPT

## 2024-09-20 PROCEDURE — 80053 COMPREHEN METABOLIC PANEL: CPT

## 2024-09-20 PROCEDURE — 84439 ASSAY OF FREE THYROXINE: CPT

## 2024-09-20 PROCEDURE — 83695 ASSAY OF LIPOPROTEIN(A): CPT

## 2024-09-20 PROCEDURE — 82043 UR ALBUMIN QUANTITATIVE: CPT

## 2024-09-20 PROCEDURE — 86141 C-REACTIVE PROTEIN HS: CPT

## 2024-09-20 PROCEDURE — 84443 ASSAY THYROID STIM HORMONE: CPT

## 2024-09-20 PROCEDURE — 84481 FREE ASSAY (FT-3): CPT

## 2024-09-20 PROCEDURE — 82570 ASSAY OF URINE CREATININE: CPT

## 2024-09-20 PROCEDURE — 83704 LIPOPROTEIN BLD QUAN PART: CPT | Mod: 90

## 2024-09-20 PROCEDURE — 83036 HEMOGLOBIN GLYCOSYLATED A1C: CPT

## 2024-09-20 PROCEDURE — 99000 SPECIMEN HANDLING OFFICE-LAB: CPT

## 2024-09-20 PROCEDURE — 80061 LIPID PANEL: CPT | Mod: 90

## 2024-09-20 PROCEDURE — 85025 COMPLETE CBC W/AUTO DIFF WBC: CPT

## 2024-09-23 LAB
CHOLEST SERPL-MCNC: 108 MG/DL
HDL SERPL QN: 8.5 NM
HDL SERPL-SCNC: 27.7 UMOL/L
HDLC SERPL-MCNC: 33 MG/DL
HLD.LARGE SERPL-SCNC: <2.8 UMOL/L
LDL SERPL QN: 20.4 NM
LDL SERPL-SCNC: 1018 NMOL/L
LDL SMALL SERPL-SCNC: 729 NMOL/L
LDLC SERPL CALC-MCNC: 46 MG/DL
PATHOLOGY STUDY: ABNORMAL
TRIGL SERPL-MCNC: 143 MG/DL
VLDL LARGE SERPL-SCNC: <1.5 NMOL/L
VLDL SERPL QN: 45.2 NM

## 2024-09-30 ASSESSMENT — SLEEP AND FATIGUE QUESTIONNAIRES
HOW LIKELY ARE YOU TO NOD OFF OR FALL ASLEEP WHILE SITTING INACTIVE IN A PUBLIC PLACE: SLIGHT CHANCE OF DOZING
HOW LIKELY ARE YOU TO NOD OFF OR FALL ASLEEP WHILE WATCHING TV: SLIGHT CHANCE OF DOZING
HOW LIKELY ARE YOU TO NOD OFF OR FALL ASLEEP WHILE SITTING QUIETLY AFTER LUNCH WITHOUT ALCOHOL: SLIGHT CHANCE OF DOZING
HOW LIKELY ARE YOU TO NOD OFF OR FALL ASLEEP WHILE LYING DOWN TO REST IN THE AFTERNOON WHEN CIRCUMSTANCES PERMIT: HIGH CHANCE OF DOZING
HOW LIKELY ARE YOU TO NOD OFF OR FALL ASLEEP WHILE SITTING AND TALKING TO SOMEONE: WOULD NEVER DOZE
HOW LIKELY ARE YOU TO NOD OFF OR FALL ASLEEP WHEN YOU ARE A PASSENGER IN A CAR FOR AN HOUR WITHOUT A BREAK: WOULD NEVER DOZE
HOW LIKELY ARE YOU TO NOD OFF OR FALL ASLEEP WHILE SITTING AND READING: SLIGHT CHANCE OF DOZING
HOW LIKELY ARE YOU TO NOD OFF OR FALL ASLEEP IN A CAR, WHILE STOPPED FOR A FEW MINUTES IN TRAFFIC: WOULD NEVER DOZE

## 2024-10-04 ENCOUNTER — VIRTUAL VISIT (OUTPATIENT)
Dept: SLEEP MEDICINE | Facility: CLINIC | Age: 74
End: 2024-10-04
Attending: STUDENT IN AN ORGANIZED HEALTH CARE EDUCATION/TRAINING PROGRAM
Payer: COMMERCIAL

## 2024-10-04 DIAGNOSIS — G47.33 OBSTRUCTIVE SLEEP APNEA: ICD-10-CM

## 2024-10-04 PROCEDURE — 95800 SLP STDY UNATTENDED: CPT | Mod: 93

## 2024-10-09 ENCOUNTER — OFFICE VISIT (OUTPATIENT)
Dept: OTHER | Facility: CLINIC | Age: 74
End: 2024-10-09
Attending: INTERNAL MEDICINE
Payer: COMMERCIAL

## 2024-10-09 VITALS
HEART RATE: 105 BPM | BODY MASS INDEX: 26.5 KG/M2 | DIASTOLIC BLOOD PRESSURE: 80 MMHG | SYSTOLIC BLOOD PRESSURE: 118 MMHG | WEIGHT: 190 LBS | OXYGEN SATURATION: 94 %

## 2024-10-09 DIAGNOSIS — E11.9 TYPE 2 DIABETES, HBA1C GOAL < 7% (H): Primary | ICD-10-CM

## 2024-10-09 DIAGNOSIS — I10 ESSENTIAL HYPERTENSION WITH GOAL BLOOD PRESSURE LESS THAN 130/80: ICD-10-CM

## 2024-10-09 DIAGNOSIS — Z12.5 SCREENING FOR PROSTATE CANCER: ICD-10-CM

## 2024-10-09 DIAGNOSIS — F51.11 PRIMARY HYPERSOMNIA: ICD-10-CM

## 2024-10-09 DIAGNOSIS — Z00.00 MEDICARE ANNUAL WELLNESS VISIT, SUBSEQUENT: ICD-10-CM

## 2024-10-09 DIAGNOSIS — E78.5 HYPERLIPIDEMIA LDL GOAL <70: ICD-10-CM

## 2024-10-09 PROCEDURE — 99214 OFFICE O/P EST MOD 30 MIN: CPT | Mod: 25 | Performed by: INTERNAL MEDICINE

## 2024-10-09 PROCEDURE — G0463 HOSPITAL OUTPT CLINIC VISIT: HCPCS | Performed by: INTERNAL MEDICINE

## 2024-10-09 PROCEDURE — G0439 PPPS, SUBSEQ VISIT: HCPCS | Performed by: INTERNAL MEDICINE

## 2024-10-09 RX ORDER — GLIMEPIRIDE 4 MG/1
4 TABLET ORAL
Qty: 180 TABLET | Refills: 3 | Status: SHIPPED | OUTPATIENT
Start: 2024-10-09

## 2024-10-09 RX ORDER — FINASTERIDE 5 MG/1
5 TABLET, FILM COATED ORAL DAILY
COMMUNITY
Start: 2024-10-09

## 2024-10-09 RX ORDER — TAMSULOSIN HYDROCHLORIDE 0.4 MG/1
0.4 CAPSULE ORAL DAILY
COMMUNITY
Start: 2024-10-09

## 2024-10-09 RX ORDER — ROSUVASTATIN CALCIUM 40 MG/1
40 TABLET, COATED ORAL DAILY
Qty: 90 TABLET | Refills: 3 | Status: SHIPPED | OUTPATIENT
Start: 2024-10-09

## 2024-10-09 RX ORDER — LISINOPRIL 20 MG/1
20 TABLET ORAL DAILY
Qty: 90 TABLET | Refills: 3 | Status: SHIPPED | OUTPATIENT
Start: 2024-10-09

## 2024-10-09 RX ORDER — ZOLPIDEM TARTRATE 10 MG/1
TABLET ORAL
Qty: 10 TABLET | Refills: 3 | Status: SHIPPED | OUTPATIENT
Start: 2024-10-09

## 2024-10-09 RX ORDER — LIRAGLUTIDE 6 MG/ML
1.8 INJECTION SUBCUTANEOUS DAILY
Qty: 27 ML | Refills: 3 | Status: SHIPPED | OUTPATIENT
Start: 2024-10-09

## 2024-10-09 RX ORDER — EZETIMIBE 10 MG/1
10 TABLET ORAL DAILY
Qty: 45 TABLET | Refills: 3 | Status: SHIPPED | OUTPATIENT
Start: 2024-10-09

## 2024-10-09 NOTE — PROGRESS NOTES
[facility]        Patient is here for a  follow up.    Pt is currently taking Aspirin and Statin.    /80 (BP Location: Right arm, Patient Position: Chair, Cuff Size: Adult Regular)   Pulse 105   Wt 190 lb (86.2 kg)   SpO2 94%   BMI 26.50 kg/m      The provider has been notified that the patient has no concerns.     Questions patient would like addressed today are: N/A.    Refills are needed: N/A    Has homecare services and agency name:  Kiera [email or web address]

## 2024-10-09 NOTE — PROGRESS NOTES
VASCULAR MEDICINE FOLLOW UP VISIT          A/P:     (Z00.00) Medicare annual wellness visit, subsequent  (primary encounter diagnosis)  Comment: Doing well, RTC one year for annual exam, colonosocpy per GI  Plan: CBC with Platelets & Differential,         Comprehensive metabolic panel, OFFICE/OUTPT        VISIT,EST,LEVL IV             (E78.5) Hyperlipidemia LDL goal <70  Comment: LDL-C and Lp(a) at goal; cardiac CRP and LDL-P elevated on Crestor 40 mg daily.   Plan:Add zetia 5 mg daily to  rosuvastatin (CRESTOR) 40 MG tablet,         Check Lipoprotein (a), LipoFit by NMR, T3 Free, T4         free, TSH in 02/2025, RCT two weeks later, OFFICE/OUTPT        VISIT,EST,LEVL IV,\             (I10) Essential hypertension with goal blood pressure less than 130/80  Comment:  at goal  Plan: Continue lisinopril (ZESTRIL) 20 MG tablet, OFFICE/OUTPT        VISIT,EST,LEVL IV             (E11.00) Type 2 diabetes mellitus with A1C goal < 7 %  Comment: not at goal on metformin, Jardiance, Amaryl, and Victoza. We discussed insulin use , he elects to not start insulin presently. He would like to improve metabolic conditioning by undertaking a HIIT exercise routine. He is also wanting a personal CGM , which is reasonable due to poor glycemic control on four meds for DM2 with persistent suboptimal glycemic control. . RTC three months for labs, see me two weeks later. He is told to stop Jardiance for any UTI sxs.  Plan: liraglutide (VICTOZA) 18 MG/3ML solution,         metFORMIN (GLUCOPHAGE) 1000 MG tablet,         Comprehensive metabolic panel, Albumin Random         Urine Quantitative with Creat Ratio, C-Reactive        Protein, High Sensitivity, Hemoglobin A1c,         empagliflozin (JARDIANCE) 25 MG TABS tablet, OFFICE/OUTPT        VISIT,EST,LEVL IV             (R33.9) Urinary retention  Comment:Needs the below.  Plan: Continue tamsulosin (FLOMAX) 0.4 MG capsule, OFFICE/OUTPT        VISIT,EST,LEVL IV                  (F51.11) Primary  hypersomnia  Comment: needs the below intermittently  Plan: zolpidem (AMBIEN) 10 MG tablet, OFFICE/OUTPT        VISIT,EST,LEVL IV            (Z12.5) Special screening for malignant neoplasm of prostate  Comment:PSA WNL.  Plan: Repeat PSA in three months, OFFICE/OUTPT        VISIT,EST,LEVL IV             32 minutes not spent on preventive care during this visit was spent providing medical care regarding item(s) # 2 onward as delineated above.          HPI: Dino Daniels is a 73 year old male with htn, HLD, DM2, obesity here for a Medicare annual exam as well as lab and med rview. His A1C has gone up. He has no hypoglycemic episodes, he has no h/o UTIs.     Review Of Systems  Skin: negative  Eyes: negative  Ears/Nose/Throat: negative  Respiratory: No shortness of breath, dyspnea on exertion, cough, or hemoptysis  Cardiovascular: negative  Gastrointestinal: negative  Genitourinary: negative  Musculoskeletal: negative  Neurologic: negative  Psychiatric: negative  Hematologic/Lymphatic/Immunologic: negative  Endocrine: negative          Current providers caring for this patient include:  Patient Care Team:  Abel Fernandez MD as PCP - General  Abel Fernandez MD as Assigned Heart and Vascular Provider       PAST MEDICAL HISTORY:                  Past Medical History:   Diagnosis Date    Arthritis     knees, left ankle and right thumb    Bladder stone     DEPRESSIVE DISORDER NEC 10/9/2006    resolved in 2009    Hypertension     Mumps     Other and unspecified hyperlipidemia     abstracted 7/17/02.    Type II or unspecified type diabetes mellitus without mention of complication, not stated as uncontrolled        PAST SURGICAL HISTORY:                  Past Surgical History:   Procedure Laterality Date    LASER HOLMIUM LITHOTRIPSY BLADDER  6/22/2012    Procedure: LASER HOLMIUM LITHOTRIPSY BLADDER;  Cystoscopy with Removal of Bladder Stone with Holmium Laser;  Surgeon: Chaz Cobb MD;   Location: [facility] OR    TESTICLE SURGERY      VASECTOMY      VASECTOMY      wisdom teeth[         CURRENT MEDICATIONS:                  Current Outpatient Medications   Medication Sig Dispense Refill    aspirin (ASA) 81 MG EC tablet Take 1 tablet (81 mg) by mouth daily 90 tablet 3    blood glucose (CONTOUR NEXT TEST) test strip TEST 4 TIMES DAILY 400 strip 3    blood glucose calibration (CONTOUR NEXT CONTROL LOW VI SOLN) Low solution Use to calibrate blood glucose monitor as directed. 1 Bottle 1    blood glucose calibration (CONTOUR NEXT CONTROL NORMAL VI SOLN) Normal solution Use to calibrate blood glucose monitor as needed as directed. 1 Bottle 1    blood glucose monitoring (CONTOUR NEXT MONITOR W/DEVICE KIT) meter device kit Use to test blood sugar 4 times daily or as directed. 1 kit 1    empagliflozin (JARDIANCE) 25 MG TABS tablet Take 1 tablet (25 mg) by mouth daily 90 tablet 3    glimepiride (AMARYL) 4 MG tablet Take 1 tablet (4 mg) by mouth 2 times daily (before meals) 180 tablet 3    insulin pen needle (BD PEN NEEDLE MICRO U/F) 32G X 6 MM miscellaneous USE 1 PEN NEEDLE DAILY OR AS DIRECTED 100 each 2    KRILL OIL OR None Entered      liraglutide (VICTOZA PEN) 18 MG/3ML solution Inject 1.8 mg subcutaneously daily. 27 mL 1    lisinopril (ZESTRIL) 20 MG tablet Take 1 tablet (20 mg) by mouth daily 90 tablet 3    metFORMIN (GLUCOPHAGE) 1000 MG tablet Take 1 tablet (1,000 mg) by mouth 2 times daily (with meals) 180 tablet 2    Microlet Lancets MISC Use to test blood sugar 4 times daily. 300 each 3    MULTIPLE VITAMINS CAPS   OR 1 capsule qd       ONETOUCH DELICA LANCETS 33G MISC TEST 4 TIMES DAILY 300 each 1    rosuvastatin (CRESTOR) 40 MG tablet Take 1 tablet (40 mg) by mouth daily 90 tablet 3    tamsulosin (FLOMAX) 0.4 MG capsule Take 1 capsule (0.4 mg) by mouth daily 90 capsule 1    zolpidem (AMBIEN) 10 MG tablet TAKE 1 TABLET BY MOUTH AT BEDTIME AS NEEDED FOR SLEEP 10 tablet 5       ALLERGIES:                   Allergies   Allergen Reactions    No Known Drug Allergy        SOCIAL HISTORY:                  Social History     Socioeconomic History    Marital status:      Spouse name: Not on file    Number of children: Not on file    Years of education: Not on file    Highest education level: Not on file   Occupational History    Not on file   Tobacco Use    Smoking status: Never    Smokeless tobacco: Never   Substance and Sexual Activity    Alcohol use: Not Currently     Comment: rarely    Drug use: No    Sexual activity: Not on file   Other Topics Concern    Parent/sibling w/ CABG, MI or angioplasty before 65F 55M? Not Asked   Social History Narrative    Not on file     Social Determinants of Health     Financial Resource Strain: Low Risk  (6/29/2023)    Received from [facility]    Overall Financial Resource Strain (CARDIA)     Difficulty of Paying Living Expenses: Not hard at all   Food Insecurity: No Food Insecurity (6/29/2023)    Received from [facility]    Hunger Vital Sign     Worried About Running Out of Food in the Last Year: Never true     Ran Out of Food in the Last Year: Never true   Transportation Needs: No Transportation Needs (6/29/2023)    Received from [facility]    PRAPARE - Transportation     Lack of Transportation (Medical): No     Lack of Transportation (Non-Medical): No   Physical Activity: Patient Declined (5/31/2023)    Received from [facility]    Exercise Vital Sign     Days of Exercise per Week: Patient declined     Minutes of Exercise per Session: Patient declined   Stress: Not on file   Social Connections: Not on file   Interpersonal Safety: Patient Declined (6/29/2023)    Received from [facility]    Humiliation, Afraid, Rape, and Kick questionnaire     Fear of Current or Ex-Partner: Patient declined     Emotionally Abused: Patient declined     Physically Abused: Patient declined     Sexually Abused: Patient declined    Housing Stability: Low Risk  (2023)    [facility]    Housing Stability     What is your living situation today?: I have a steady place to live       FAMILY HISTORY:                   Family History   Problem Relation Age of Onset     () Father          age 56 leukemia    Cerebrovascular Disease Mother      () Brother         b,     () Sister         b, back problems     () Brother         b, back problems        Diet: diabetic  Physical Activity: generally inactive  Depression Screen:    Over the past 2 weeks, patient has felt down, depressed, or hopeless:  No    Over the past 2 weeks, patient has felt little interest or pleasure in doing things: No     Functional ability/Safety screen:  Up and go test (able to get up and walk longer than 30 seconds): Passed  Patient needs assistance with: nothing  Patient's home has the following possible safety concerns: none identified  Patient has concerns about his hearing:  Yes  Cognitive Screen  Patient repeats three objects (ball, flag, tree)      Clock drawing test:   NORMAL  Recalls three objects after 3 minutes (ball,flag,tree):                                                                                               recalls 3 objects (3 points)     Physical exam Reveals:  /80 (BP Location: Right arm, Patient Position: Chair, Cuff Size: Adult Regular)   Pulse 105   Wt 190 lb (86.2 kg)   SpO2 94%   BMI 26.50 kg/m        O/P: WNL  HEENT: WNL  NECK: No JVD, thyromegaly, or lymphadenopathy  HEART: RRR, no murmurs, gallops, or rubs  LUNGS: CTA bilaterally without rales, wheezes, or rhonchi  GI: NABS, nondistended, nontender, soft  EXT:without cyanosis, clubbing, or edema  NEURO: nonfocal  : no flank tenderness       End of Life Planning:   Patient currently has an advanced directive: Yes.  Practitioner is supportive of decision.     Education/Counseling:   Based on review of the above information, the  following items were addressed:      Diabetes -  access to diabetes self-management training, medical nutrition therapy, and treatment     Appropriate preventive services were discussed with this patient, including applicable screening as appropriate for cardiovascular disease, diabetes, osteopenia/osteoporosis, and glaucoma.  As appropriate for age/gender, discussed screening for colorectal cancer, prostate cancer, breast cancer, and cervical cancer.   Checklist reviewing preventive services available has been given to the patient.               All relevant labs and imaging reviewed by myself on today's date.

## 2024-10-18 NOTE — PROGRESS NOTES
Device has been registered and shipped via [employer] on 10/04/24. Patient was notified that package was mailed out.

## 2024-10-21 ENCOUNTER — MYC MEDICAL ADVICE (OUTPATIENT)
Dept: SLEEP MEDICINE | Facility: CLINIC | Age: 74
End: 2024-10-21
Payer: COMMERCIAL

## 2024-10-21 DIAGNOSIS — G47.33 OBSTRUCTIVE SLEEP APNEA: Primary | ICD-10-CM

## 2024-10-21 NOTE — PROCEDURES
"WatchPAT - HOME SLEEP STUDY INTERPRETATION    Patient: Dino Daniels  MRN: [id number]  YOB: 1950  Study Date: 10/4/2024  Referring Provider: Abel Fernandez  Ordering Provider: Chichi Celeste MD      Chain of custody patient verification was not enabled.  Chain of custody verification was not present throughout the entire study.     Indications for Home Study: Dino Daniels is a 73 year old male with a history of HTN, T2DM, HLD, and dysfunctional voiding who presents with symptoms suggestive of obstructive sleep apnea.    Estimated body mass index is 26.5 kg/m  as calculated from the following:    Height as of 7/25/24: 1.803 m (5' 11\").    Weight as of 10/9/24: 86.2 kg (190 lb).  Total score - [place]: 7 (9/30/2024  7:28 PM)  Total Score: 5 (9/30/2024  7:30 PM)    Data: A full night home sleep study was performed recording the standard physiologic parameters including peripheral arterial tonometry (PAT), sound/snoring, body position,  movement, sound, and oxygen saturation by pulse oximetry. Pulse rate was estimated by oximetry recording. Sleep staging (wake, REM, light, and deep sleep) was derived from PAT signal.  This study was considered adequate based on > 4 hours of quality oximetry and respiratory recording. As specified by the AASM Manual for the Scoring of Sleep and Associated events, version 2.3, Rule VIII.D 1B, 4% oxygen desaturation scoring for hypopneas is used as a standard of care on all home sleep apnea testing.    Total Recording Time: 7 hrs, 10 min  Total Sleep Time: 5 hrs, 54 min  % of Sleep Time REM: 20.4%    Respiratory:  Snoring: Snoring was present.  Respiratory events: The PAT respiratory disturbance index [pRDI] was 30.3 events per hour.  The PAT apnea/hypopnea index [pAHI] was 25 events per hour.  RICHARD was 19.4 events per hour.  During REM sleep the pAHI was 51.9.  Sleep Associated Hypoxemia: sustained hypoxemia was present. Mean oxygen saturation was " 91%.  Minimum was 80%.  Time with saturation less than 88% was 12.3 minutes.    Heart Rate: By pulse oximetry normal rate was noted.     Position: Percent of time spent: supine - 16.9%, prone - 0.6%, on right - 31.8%, on left - 50.7%.  pAHI was 40.6 per hour supine, 0 per hour prone, 28.0 per hour on right side, and 18.4 per hour on left side.     Assessment:   Moderate obstructive sleep apnea.  Sleep associated hypoxemia was present.    Recommendations:  Consider auto-CPAP at 05-15 cmH2O or oral appliance therapy.  Suggest optimizing sleep hygiene and avoiding sleep deprivation.  Weight management.    Diagnosis Code(s): Obstructive Sleep Apnea G47.33, Hypoxemia G47.36    Chichi Celeste MD, October 21, 2024   Diplomate, American Board of Internal Medicine, Sleep Medicine

## 2024-10-21 NOTE — PROGRESS NOTES
Watch Pat has been scored using rule 1B, 4%.  Patient to follow up with provider to determine appropriate therapy.    PAT AHI: 25    Ordering Provider: Chichi Celeste MD

## 2024-10-26 ENCOUNTER — MYC MEDICAL ADVICE (OUTPATIENT)
Dept: OTHER | Facility: CLINIC | Age: 74
End: 2024-10-26
Payer: COMMERCIAL

## 2024-10-26 DIAGNOSIS — E78.5 HYPERLIPIDEMIA LDL GOAL <70: Primary | ICD-10-CM

## 2024-10-28 NOTE — TELEPHONE ENCOUNTER
Contacted patient and requested call back to provide recommendations.    Maryjane Sanders, KAYODEN, RN, CV-BC, CNOR  [facility]

## 2024-10-28 NOTE — TELEPHONE ENCOUNTER
Please contact Dino. Please have him stop Crestor for two weeks, then resume the Crestor. This may be causing his leg cramping. After off of it for two weeks, I would like him to rechallenge with the Crestor. I would like to see him two weeks later to ascertain if the cramping returns once he resumed Crestor. That visit can be virtual.

## 2024-10-28 NOTE — TELEPHONE ENCOUNTER
Spoke with patient regarding Dr. Fernandez's recommendations.  Pt verbalized understanding and was appreciative of the return call.    Patient will need to be scheduled for the following:  Phone/video visit with Dr. Fernandez (follow up order entered)  No labs/img needed  Schedule around 11/28/24.    Routing to scheduling to contact patient to coordinate above.    KAYODE MelchorN, RN, CV-BC, CNOR  [facility]

## 2024-10-28 NOTE — TELEPHONE ENCOUNTER
Routing to Dr. Fernandez to advise, please see [employer] message.  Maryjane Sanders, KAYODEN, RN, CV-BC, CNOR  [facility]

## 2024-11-13 ENCOUNTER — DOCUMENTATION ONLY (OUTPATIENT)
Dept: SLEEP MEDICINE | Facility: CLINIC | Age: 74
End: 2024-11-13
Payer: COMMERCIAL

## 2024-11-13 DIAGNOSIS — G47.33 OBSTRUCTIVE SLEEP APNEA: Primary | ICD-10-CM

## 2024-11-13 NOTE — PROGRESS NOTES
Patient was offered choice of vendor and chose [facility].  Patient Dino Daniels was set up at [place] on November 13, 2024. Patient received a Resmed Airsense 10 Pressures were set at  5-15 cm H2O.   Patient s ramp is 5 cm H2O for Auto and FLEX/EPR is EPR, 2.  Patient received a Resmed Mask name: Airfit [id number] Nasal mask size Small frame, sm-wide cushion; heated tubing and heated humidifier.  Patient has the following compliance requirements: using and visit requirements  Patient has a follow up due 12.13.24 - 2.13.25 with Dr Celeste.    Tracy L Fahrenkamp

## 2024-11-18 ENCOUNTER — DOCUMENTATION ONLY (OUTPATIENT)
Dept: SLEEP MEDICINE | Facility: CLINIC | Age: 74
End: 2024-11-18
Payer: COMMERCIAL

## 2024-11-18 NOTE — PROGRESS NOTES
3 day Sleep therapy management telephone visit    Diagnostic AHI:   25.6    Confirmed with patient at time of call- Yes Patient is still interested in STM service       Subjective measures:  Patient stated everything going good with CPAP.  He will reach out to me if he has questions or concerns.          Objective data     Order Settings for PAP  CPAP min     CPAP max     CPAP fixed         Device settings from machine CPAP min 5.0     CPAP max 15.0      CPAP fixed      EPR Setting TWO    RESMED soft response  OFF     Assessment: Nightly usage over four hours      Patient has the following upcoming sleep appts:      Replacement device: No  STM ordered by provider: Yes     Total time spent on accessing and  interpreting remote patient PAP therapy data  10 minutes    Total time spent counseling, coaching  and reviewing PAP therapy data with patient  4 minutes    [id number] no

## 2024-11-26 ENCOUNTER — VIRTUAL VISIT (OUTPATIENT)
Dept: OTHER | Facility: CLINIC | Age: 74
End: 2024-11-26
Attending: INTERNAL MEDICINE
Payer: COMMERCIAL

## 2024-11-26 ENCOUNTER — TELEPHONE (OUTPATIENT)
Dept: OTHER | Facility: CLINIC | Age: 74
End: 2024-11-26

## 2024-11-26 DIAGNOSIS — E78.5 HYPERLIPIDEMIA LDL GOAL <70: ICD-10-CM

## 2024-11-26 PROCEDURE — 99443 PR PHYSICIAN TELEPHONE EVALUATION 21-30 MIN: CPT | Mod: 95 | Performed by: INTERNAL MEDICINE

## 2024-11-26 NOTE — PROGRESS NOTES
VASCULAR MEDICINE FOLLOW UP VISIT              A/P:               (E78.5) Hyperlipidemia LDL goal <70  Comment: LDL-C and Lp(a) at goal; cardiac CRP and LDL-P elevated on Crestor 40 mg daily so we added Zetia 5 mg daily. He called c/o recurrent myalgias. He stopped Crestor on 11/17/2024 and myalgias went away after two days. He had muscle aching on Zocor as well as Pravachol as well.   Plan: Stay off Crestor. He has had myalgias on Crestor. Continue zetia 5 mg daily. I advised he could use Repatha or Leqvio. He chooses Repatha. Reason for a PCSK9 inhibitor is inability tolerate three separate statins with myalgias, and not being at goal on no statin.         Check Lipoprotein (a), LipoFit by NMR, T3 Free, T4         free, TSH in 02/2025, RCT two weeks later.                       (E11.00) Type 2 diabetes mellitus with A1C goal < 7 %  Comment: not at goal on metformin, Jardiance, Amaryl, and Victoza. We discussed insulin use , he elects to not start insulin presently. He would like to improve metabolic conditioning by undertaking a HIIT exercise routine. He is also wanting a personal CGM , which is reasonable due to poor glycemic control on four meds for DM2 with persistent suboptimal glycemic control. . RTC three months for labs, see me two weeks later. He is told to stop Jardiance for any UTI sxs.  Plan: liraglutide (VICTOZA) 18 MG/3ML solution,         metFORMIN (GLUCOPHAGE) 1000 MG tablet,         Comprehensive metabolic panel, Albumin Random         Urine Quantitative with Creat Ratio, C-Reactive        Protein, High Sensitivity, Hemoglobin A1c, in 02/2025, RTC two weeks later.         empagliflozin (JARDIANCE) 25 MG TABS tablet,       22 minutes total medical care on today's date.    MD location: office  Pt location: home    Phone call start: 15:50  Phone call end: 16:12             HPI: Dino Daniels is a 73 year old male with htn, HLD, DM2, obesity here for a Medicare annual exam as well as lab and med  rview. His A1C has gone up. He has no hypoglycemic episodes, he has no h/o UTIs.      Review Of Systems  Skin: negative  Eyes: negative  Ears/Nose/Throat: negative  Respiratory: No shortness of breath, dyspnea on exertion, cough, or hemoptysis  Cardiovascular: negative  Gastrointestinal: negative  Genitourinary: negative  Musculoskeletal: negative  Neurologic: negative  Psychiatric: negative  Hematologic/Lymphatic/Immunologic: negative  Endocrine: negative      PAST MEDICAL HISTORY:                  Past Medical History:   Diagnosis Date    Arthritis     knees, left ankle and right thumb    Bladder stone     DEPRESSIVE DISORDER NEC 10/9/2006    resolved in 2009    Hypertension     Mumps     Other and unspecified hyperlipidemia     abstracted 7/17/02.    Type II or unspecified type diabetes mellitus without mention of complication, not stated as uncontrolled        PAST SURGICAL HISTORY:                  Past Surgical History:   Procedure Laterality Date    LASER HOLMIUM LITHOTRIPSY BLADDER  6/22/2012    Procedure: LASER HOLMIUM LITHOTRIPSY BLADDER;  Cystoscopy with Removal of Bladder Stone with Holmium Laser;  Surgeon: Chaz Cobb MD;  Location: [facility] OR    TESTICLE SURGERY      VASECTOMY      VASECTOMY      wisdom teeth[         CURRENT MEDICATIONS:                  Current Outpatient Medications   Medication Sig Dispense Refill    aspirin (ASA) 81 MG EC tablet Take 1 tablet (81 mg) by mouth daily 90 tablet 3    blood glucose (CONTOUR NEXT TEST) test strip TEST 4 TIMES DAILY 400 strip 3    blood glucose calibration (CONTOUR NEXT CONTROL LOW VI SOLN) Low solution Use to calibrate blood glucose monitor as directed. 1 Bottle 1    blood glucose calibration (CONTOUR NEXT CONTROL NORMAL VI SOLN) Normal solution Use to calibrate blood glucose monitor as needed as directed. 1 Bottle 1    blood glucose monitoring (CONTOUR NEXT MONITOR W/DEVICE KIT) meter device kit Use to test blood sugar 4 times daily or as  directed. 1 kit 1    Continuous Glucose Sensor (FREESTYLE RADHA 2 SENSOR) MISC 1 each every 14 days. Use 1 sensor every 14 days. Use to read blood sugars per 's instructions. 6 each 5    empagliflozin (JARDIANCE) 25 MG TABS tablet Take 1 tablet (25 mg) by mouth daily 90 tablet 3    ezetimibe (ZETIA) 10 MG tablet Take 1 tablet (10 mg) by mouth daily. 45 tablet 3    finasteride (PROSCAR) 5 MG tablet Take 1 tablet (5 mg) by mouth daily.      glimepiride (AMARYL) 4 MG tablet Take 1 tablet (4 mg) by mouth 2 times daily (before meals). 180 tablet 3    insulin pen needle (BD PEN NEEDLE MICRO U/F) 32G X 6 MM miscellaneous USE 1 PEN NEEDLE DAILY OR AS DIRECTED 100 each 2    KRILL OIL OR None Entered      liraglutide (VICTOZA PEN) 18 MG/3ML solution Inject 1.8 mg subcutaneously daily. 27 mL 3    lisinopril (ZESTRIL) 20 MG tablet Take 1 tablet (20 mg) by mouth daily. 90 tablet 3    metFORMIN (GLUCOPHAGE) 1000 MG tablet Take 1 tablet (1,000 mg) by mouth 2 times daily (with meals). 180 tablet 3    Microlet Lancets MISC Use to test blood sugar 4 times daily. 300 each 3    MULTIPLE VITAMINS CAPS   OR 1 capsule qd       ONETOUCH DELICA LANCETS 33G MISC TEST 4 TIMES DAILY 300 each 1    rosuvastatin (CRESTOR) 40 MG tablet Take 1 tablet (40 mg) by mouth daily. 90 tablet 3    tamsulosin (FLOMAX) 0.4 MG capsule Take 1 capsule (0.4 mg) by mouth daily.      zolpidem (AMBIEN) 10 MG tablet TAKE 1 TABLET BY MOUTH AT BEDTIME AS NEEDED FOR SLEEP 10 tablet 3       ALLERGIES:                  Allergies   Allergen Reactions    No Known Drug Allergy        SOCIAL HISTORY:                  Social History     Socioeconomic History    Marital status:      Spouse name: Not on file    Number of children: Not on file    Years of education: Not on file    Highest education level: Not on file   Occupational History    Not on file   Tobacco Use    Smoking status: Never    Smokeless tobacco: Never   Substance and Sexual Activity     Alcohol use: Not Currently     Comment: rarely    Drug use: No    Sexual activity: Not on file   Other Topics Concern    Parent/sibling w/ CABG, MI or angioplasty before 65F 55M? Not Asked   Social History Narrative    Not on file     Social Drivers of Health     Financial Resource Strain: Low Risk  (2023)    [facility], [facility]    Overall Financial Resource Strain (CARDIA)     Difficulty of Paying Living Expenses: Not hard at all   Food Insecurity: Patient Declined (10/13/2024)    Received from [facility]    Hunger Vital Sign     Worried About Running Out of Food in the Last Year: Patient declined     Ran Out of Food in the Last Year: Patient declined   Transportation Needs: Patient Declined (10/13/2024)    Received from [facility]    PRAPARE - Transportation     Lack of Transportation (Medical): Patient declined     Lack of Transportation (Non-Medical): Patient declined   Physical Activity: Insufficiently Active (10/13/2024)    Received from [facility]    Exercise Vital Sign     Days of Exercise per Week: 2 days     Minutes of Exercise per Session: 50 min   Stress: Not on file   Social Connections: Not on file   Interpersonal Safety: Patient Declined (2023)    Received from [facility], [facility]    Humiliation, Afraid, Rape, and Kick questionnaire     Fear of Current or Ex-Partner: Patient declined     Emotionally Abused: Patient declined     Physically Abused: Patient declined     Sexually Abused: Patient declined   Housing Stability: Patient Declined (10/13/2024)    Received from [facility]    Housing Stability     What is your living situation today?: Patient declined       FAMILY HISTORY:                   Family History   Problem Relation Age of Onset    Other Problems  () Father          age 56 leukemia    Cerebrovascular Disease Mother     Other Problems  () Brother         b,    Other Problems  () Sister         b, back problems    Other Problems  ()  Brother         b,1955 back problems         Physical exam was not undertaken as this was a billable video visit.

## 2024-11-26 NOTE — PROGRESS NOTES
Dino is a 74 year old who is being evaluated via a billable video visit.    How would you like to obtain your AVS? MyChart  If the video visit is dropped, the invitation should be resent by: Text to cell phone: [phone]  Will anyone else be joining your video visit? No      Kerry Dooley MA

## 2024-11-26 NOTE — TELEPHONE ENCOUNTER
Central Prior Authorization Team   Phone: [phone]    PA Initiation    Medication: Repatha 140mg/ml  Insurance Company: [employer] - Phone [phone] Fax [phone]  Pharmacy Filling the Rx: [facility] - [place], [place] - [address] [place] [address]  Filling Pharmacy Phone: [phone]  Filling Pharmacy Fax:    Start Date: 11/26/2024

## 2024-11-27 NOTE — TELEPHONE ENCOUNTER
Prior Authorization Approval    Authorization Effective Date: 8/28/2024  Authorization Expiration Date: 11/26/2025  Medication: Repatha 140mg/ml  Approved Dose/Quantity:   Reference #:     Insurance Company: [employer] - Phone [phone] Fax [phone]  Expected CoPay:       CoPay Card Available:      Foundation Assistance Needed:    Which Pharmacy is filling the prescription (Not needed for infusion/clinic administered): [facility] - [address]  Pharmacy Notified:  yes  Patient Notified:  yes- Pharmacy will contact patient when ready to /ship

## 2024-12-03 ENCOUNTER — TELEPHONE (OUTPATIENT)
Dept: OTHER | Facility: CLINIC | Age: 74
End: 2024-12-03
Payer: COMMERCIAL

## 2024-12-03 DIAGNOSIS — E78.5 HYPERLIPIDEMIA LDL GOAL <70: Primary | ICD-10-CM

## 2024-12-03 DIAGNOSIS — I10 ESSENTIAL HYPERTENSION WITH GOAL BLOOD PRESSURE LESS THAN 130/80: ICD-10-CM

## 2024-12-03 DIAGNOSIS — E11.00 TYPE 2 DIABETES MELLITUS WITH HYPEROSMOLARITY WITHOUT COMA, WITHOUT LONG-TERM CURRENT USE OF INSULIN (H): ICD-10-CM

## 2024-12-03 NOTE — TELEPHONE ENCOUNTER
Routing to scheduling to coordinate the following:    Fasting labs  In person or virtual follow up 2 weeks after labs  Please schedule this in 2 months     Appt note: Follow up to 11/26/24    Nancy BORJAS RN    [facility]– [place][note 2[place]  Office: [phone]  Fax: [phone]

## 2024-12-05 NOTE — TELEPHONE ENCOUNTER
Left voicemail for patient to call back to schedule appointment(s), provided telephone number for patient to call back to schedule.    Flagging chart

## 2024-12-17 ENCOUNTER — VIRTUAL VISIT (OUTPATIENT)
Dept: SLEEP MEDICINE | Facility: CLINIC | Age: 74
End: 2024-12-17
Payer: COMMERCIAL

## 2024-12-17 VITALS — HEIGHT: 71 IN | BODY MASS INDEX: 27.02 KG/M2 | WEIGHT: 193 LBS

## 2024-12-17 DIAGNOSIS — G47.33 OSA (OBSTRUCTIVE SLEEP APNEA): Primary | ICD-10-CM

## 2024-12-17 PROCEDURE — 99213 OFFICE O/P EST LOW 20 MIN: CPT | Mod: 95 | Performed by: NURSE PRACTITIONER

## 2024-12-17 ASSESSMENT — SLEEP AND FATIGUE QUESTIONNAIRES
HOW LIKELY ARE YOU TO NOD OFF OR FALL ASLEEP WHILE SITTING AND READING: SLIGHT CHANCE OF DOZING
HOW LIKELY ARE YOU TO NOD OFF OR FALL ASLEEP WHILE SITTING INACTIVE IN A PUBLIC PLACE: WOULD NEVER DOZE
HOW LIKELY ARE YOU TO NOD OFF OR FALL ASLEEP WHILE SITTING QUIETLY AFTER LUNCH WITHOUT ALCOHOL: WOULD NEVER DOZE
HOW LIKELY ARE YOU TO NOD OFF OR FALL ASLEEP WHEN YOU ARE A PASSENGER IN A CAR FOR AN HOUR WITHOUT A BREAK: WOULD NEVER DOZE
HOW LIKELY ARE YOU TO NOD OFF OR FALL ASLEEP WHILE LYING DOWN TO REST IN THE AFTERNOON WHEN CIRCUMSTANCES PERMIT: HIGH CHANCE OF DOZING
HOW LIKELY ARE YOU TO NOD OFF OR FALL ASLEEP WHILE SITTING AND TALKING TO SOMEONE: WOULD NEVER DOZE
HOW LIKELY ARE YOU TO NOD OFF OR FALL ASLEEP WHILE WATCHING TV: SLIGHT CHANCE OF DOZING
HOW LIKELY ARE YOU TO NOD OFF OR FALL ASLEEP IN A CAR, WHILE STOPPED FOR A FEW MINUTES IN TRAFFIC: WOULD NEVER DOZE

## 2024-12-17 ASSESSMENT — PAIN SCALES - GENERAL: PAINLEVEL_OUTOF10: NO PAIN (0)

## 2024-12-17 NOTE — PATIENT INSTRUCTIONS
"MY INFORMATION ON SLEEP APNEA-  Dino Daniels    DOCTOR : RADHA Lim CNP  SLEEP CENTER :      MY CONTACT NUMBER:   [facility]  [phone]  [facility]   [phone]  [facility]   [phone]      [facility]  [phone]    DME:  [facility] [place] [address], [address]  [place] [address]  Phone: [phone]    Hours:  Mon - Fri: 8:00 a.m. - 4:30 p.m.  Sat: Closed  Sun: Closed      Key Points:  1. What is Obstructive Sleep Apnea (CRISTAL)? CRISTAL is the most common type of sleep apnea. Apnea literally means, \"without breath.\" It is characterized by repetitive pauses in breathing, despite continued effort to breathe, and is usually associated with a reduction in blood oxygen saturation. Apneas can last 10 to over 60 seconds. It is caused by narrowing or collapse of the upper airway as muscles relax during sleep.   2. What are the consequences of CRISTAL? Symptoms include: daytime sleepiness- possibly increasing the risk of falling asleep while driving, unrefreshing/restless sleep, snoring, insomnia, waking frequently to urinate, waking with heartburn or reflux, reduced concentration and memory, and morning headaches. Other health consequences may include development of high blood pressure. Untreated CRISTAL also can contribute to heart disease, stroke and diabetes.   3. What are the treatment options? In most situations, sleep apnea is a lifelong disease that must be managed with daily therapy. Continuous Positive Airway (CPAP) is the most reliable treatment. A mouthguard to hold your jaw forward is usually the next most reliable option. Other options include postioning devices (to keep you off your back), nasal valves, tongue retaining device, weight loss, surgery. There is more detail about these options toward the end of this document.  4. What are the most important things to " remember about using CPAP?     WHERE CAN I FIND MORE INFORMATION?    American Academy of Sleep Medicine Patient information on sleep disorders:  http://yoursleep.aasmnet.org    CPAP -  WHY AND HOW?                 Continuous positive airway pressure, or CPAP, is the most effective treatment for obstructive sleep apnea. It works by blowing room air, through a mask, to hold your throat open. A decision to use CPAP is a major step forward in the pursuit of a healthier life. The successful use of CPAP will help you breathe easier, sleep better and live healthier. Using CPAP can be a positive experience if you keep these hurtado points in mind:  Commitment  CPAP is not a quick fix for your problem. It involves a long-term commitment to improve your sleep and your health.    Communication  Stay in close communication with both your sleep doctor and your CPAP supplier. Ask lots of questions and seek help when you need it.    Consistency  Use CPAP all night, every night and for every nap. You will receive the maximum health benefits from CPAP when you use it every time that you sleep. This will also make it easier for your body to adjust to the treatment.    Correction  The first machine and mask that you try may not be the best ones for you. Work with your sleep doctor and your CPAP supplier to make corrections to your equipment selection. Ask about trying a different type of machine or mask if you have ongoing problems. Make sure that your mask is a good fit and learn to use your equipment properly.    Challenge  Tell a family member or close friend to ask you each morning if you used your CPAP the previous night. Have someone to challenge you to give it your best effort.    Connection   Your adjustment to CPAP will be easier if you are able to connect with others who use the same treatment. Ask your sleep doctor if there is a support group in your area for people who have sleep apnea, or look for one on the  "Internet.  Comfort   Increase your level of comfort by using a saline spray, decongestant or heated humidifier if CPAP irritates your nose, mouth or throat. Use your unit's \"ramp\" setting to slowly get used to the air pressure level. There may be soft pads you can buy that will fit over your mask straps. Look on www.CPAP.com for accessories that can help make CPAP use more comfortable.  Cleaning   Clean your mask, tubing and headgear on a regular basis. Put this time in your schedule so that you don't forget to do it. Check and replace the filters for your CPAP unit and humidifier.    Completion   Although you are never finished with CPAP therapy, you should reward yourself by celebrating the completion of your first month of treatment. Expect this first month to be your hardest period of adjustment. It will involve some trial and error as you find the machine, mask and pressure settings that are right for you.    Continuation  After your first month of treatment, continue to make a daily commitment to use your CPAP all night, every night and for every nap.    CPAP-Tips to starting with success:  Begin using your CPAP for short periods of time during the day while you watch TV or read.    Use CPAP every night and for every nap. Using it less often reduces the health benefits and makes it harder for your body to get used to it.    Newer CPAP models are virtually silent; however, if you find the sound of your CPAP machine to be bothersome, place the unit under your bed to dampen the sound.     Make small adjustments to your mask, tubing, straps and headgear until you get the right fit. Tightening the mask may actually worsen the leak.  If it leaves significant marks on your face or irritates the bridge of your nose, it may not be the best mask for you.  Speak with the person who supplied the mask and consider trying other masks. Insurances will allow you to try different masks during the first month of starting CPAP.  " Insurance also covers a new mask, hose and filter about every 6 months.    Use a saline nasal spray to ease mild nasal congestion. Neti-Pot or saline nasal rinses may also help. Nasal gel sprays can help reduce nasal dryness.  Biotene mouthwash can be helpful to protect your teeth if you experience frequent dry mouth.  Dry mouth may be a sign of air escaping out of your mouth or out of the mask in the case of a full face mask.  Speak with your provider if you expect that is the case.     Take a nasal decongestant to relieve more severe nasal or sinus congestion.  Do not use Afrin (oxymetazoline) nasal spray more than 3 days in a row.  Speak with your sleep doctor if your nasal congestion is chronic.    Use a heated humidifier that fits your CPAP model to enhance your breathing comfort. Adjust the heat setting up if you get a dry nose or throat, down if you get condensation in the hose or mask.  Position the CPAP lower than you so that any condensation in the hose drains back into the machine rather than towards the mask.    Try a system that uses nasal pillows if traditional masks give you problems.    Clean your mask, tubing and headgear once a week. Make sure the equipment dries fully.    Regularly check and replace the filters for your CPAP unit and humidifier.    Work closely with your sleep provider and your CPAP supplier to make sure that you have the machine, mask and air pressure setting that works best for you. It is better to stop using it and call your provider to solve problems than to lay awake all night frustrated with the device.  Weight Loss:    Weight loss decreases severity of sleep apnea in most people with obesity. For those with mild obesity who have developed snoring with weight gain, even 15-30 pound weight loss can improve and occasionally eliminate sleep apnea.  Structured and life-long dietary and health habits are necessary to lose weight and keep healthier weight levels.     Though there  are significant health benefits from weight loss, long-term weight loss is very difficult to achieve- studies show success with dietary management in less than 10% of people. In addition, substantial weight loss may require years of dietary control and may be difficult if patients have severe obesity. In these cases, surgical management may be considered.    If you are interested in methods for weight loss, you should review the options discussed at the National Institutes of Health patient information sites:     http://win.niddk.nih.gov/publications/index.htm  http:/www.health.nih.gov/topic/WeightLossDieting    Bariatric programs offer counseling in all methods of weight loss:    Http:/www.[facility].[email or web address]/Specialties/WeightLossSurgeryandMedicalMgmt/htm    Your BMI is Body mass index is 26.92 kg/m .    Body mass index (BMI) is one way to tell whether you are at a healthy weight, overweight, or obese. It measures your weight in relation to your height.  A BMI of 18.5 to 24.9 is in the healthy range. A person with a BMI of 25 to 29.9 is considered overweight, and someone with a BMI of 30 or greater is considered obese.  Another way to find out if you are at risk for health problems caused by overweight and obesity is to measure your waist. If you are a woman and your waist is more than 35 inches, or if you are a man and your waist is more than 40 inches, your risk of disease may be higher.  More than two-thirds of American adults are considered overweight or obese. Being overweight or obese increases the risk for further weight gain.  Excess weight may lead to heart disease and diabetes. Creating and following plans for healthy eating and physical activity may help you improve your health.    Methods for maintaining or losing weight.    Weight control is part of healthy lifestyle and includes exercise, emotional health, and healthy eating habits.  Careful eating habits lifelong is the mainstay of weight control.   Though there are significant health benefits from weight loss, long-term weight loss with diet alone may be very difficult to achieve- studies show long-term success with dietary management in less than 10% of people. Attaining a healthy weight may be especially difficult to achieve in those with severe obesity. In some cases, medications, devices and surgical management might be considered.    What can you do?    If you are overweight or obese and are interested in methods for weight loss, you should discuss this with your provider. In addition, we recommend that you review healthy life styles and methods for weight loss available through the National Institutes of Health patient information sites:     http://win.niddk.nih.gov/publications/index.htm

## 2024-12-17 NOTE — NURSING NOTE
Current patient location: [address] [place] [id number]    Is the patient currently in the state of [place]? YES    Visit mode:VIDEO    If the visit is dropped, the patient can be reconnected by:VIDEO VISIT: Text to cell phone:   Telephone Information:   Mobile [phone]       Will anyone else be joining the visit? NO  (If patient encounters technical issues they should call [phone]    Are changes needed to the allergy or medication list? No    Are refills needed on medications prescribed by this physician? NO    Rooming Documentation:  Questionnaire(s) completed    Reason for visit: RECHECK    Idania ASHF

## 2024-12-17 NOTE — PROGRESS NOTES
Virtual Visit Details    Type of service:  Video Visit   Video Start Time: 11:35 AM  Video End Time:11:42 AM    Originating Location (pt. Location): Home    Distant Location (provider location):  Off-site  Platform used for Video Visit: [facility]      Sleep Apnea - Follow-up Visit:    Impression/Plan:  1. CRISTAL (obstructive sleep apnea) (Primary)     Moderate Sleep apnea. Tolerating PAP well. Daytime symptoms are improved.  The patient appears to have met compliance requirements at this time.  He continues to use and benefit from PAP therapy.  Of note, he does indicate times during the night when the pressure seems to increase, possibly due to positional changes or REM stage sleep requiring increased pressure.    A review of his APAP download data over the last 30 days shows excellent use and compliance and moderate CRISTAL that is well-controlled on current pressure settings.    Continue current plan of care.  No new orders placed at this visit.  Discussed consideration for changing to a fixed CPAP pressure if variable pressures are affecting his sleep, but, recommended further acclamation to PAP therapy as it is only been just over a month of treatment on APAP.  The patient is in agreement with this plan of care.    Dino Daniels will follow up with Dr. Celeste, primary sleep provider, or myself in about 1 year(s), sooner if needed.     20 minutes spent with patient, all of which were spent face-to-face counseling, consulting, chart review/documentation, and coordinating plan of care on the date of the encounter.      RADHA Lim CNP  Sleep Medicine      CC:  Abel Fernandez,         History of Present Illness:  Chief Complaint   Patient presents with    RECHECK    CPAP Follow Up       Dino Daniels is a 74-year-old male with a PMH significant for HTN, DM 2, HLD, and dysfunctional voiding who presents for compliance follow-up of their moderate sleep apnea, managed with CPAP.  He was last seen in a  virtual visit on 7/25/2024 with Dr. Celeste in sleep consultation and HST testing was ordered.  The results of the study were sent to the patient and a subsequent order for PAP therapy was placed at that time.  Patient has the following compliance requirements: using and visit requirements.     Previous Study Results: [facility] - WatchPAT HST  Date: 10/04/2024.  Weight 190 pounds.  Total Recording Time: 7 hrs, 10 min  Total Sleep Time: 5 hrs, 54 min  % of Sleep Time REM: 20.4%     Respiratory:  Snoring: Snoring was present.  Respiratory events: The PAT respiratory disturbance index [pRDI] was 30.3 events per hour.  The PAT apnea/hypopnea index [pAHI] was 25 events per hour.  RICHARD was 19.4 events per hour.  During REM sleep the pAHI was 51.9.  Sleep Associated Hypoxemia: sustained hypoxemia was present. Mean oxygen saturation was 91%.  Minimum was 80%.  Time with saturation less than 88% was 12.3 minutes.     Heart Rate: By pulse oximetry normal rate was noted.      Position: Percent of time spent: supine - 16.9%, prone - 0.6%, on right - 31.8%, on left - 50.7%.  pAHI was 40.6 per hour supine, 0 per hour prone, 28.0 per hour on right side, and 18.4 per hour on left side.      Assessment:   Moderate obstructive sleep apnea.  Sleep associated hypoxemia was present.      DME: [facility]      Do you use a CPAP Machine at home: (Patient-Rptd) Yes  Overall, on a scale of 0-10 how would you rate your CPAP (0 poor, 10 great): (Patient-Rptd) 7    What type of mask do you use: (Patient-Rptd) Nasal Pillow  Is your mask comfortable: (Patient-Rptd) Yes  If not, why:      Is your mask leaking: (Patient-Rptd) No  If yes, where do you feel it:    How many night per week does the mask leak (0-7):      Do you notice snoring with mask on: (Patient-Rptd) No  Do you notice gasping arousals with mask on: (Patient-Rptd) No  Are you having significant oral or nasal dryness: (Patient-Rptd) No  Is the pressure setting comfortable: (Patient-Rptd)  Yes  If not, why:      What is your typical bedtime: (Patient-Rptd) 9:30 PM  How long does it take you to go to sleep on PAP therapy: (Patient-Rptd) 5-10 minutes  What time do you typically get out of bed for the day: (Patient-Rptd) 4:30 AM  How many hours on average per night are you using PAP therapy: (Patient-Rptd) 7  How many hours are you sleeping per night: (Patient-Rptd) 7  Do you feel well rested in the morning: (Patient-Rptd) Yes      ResMed AirSense 10 (set up on 11/13/2024 at [facility])  Auto-PAP 5.0 - 15.0 cmH2O 30 day usage data:  11/17/24 - 12/16/24  90% of days with > 4 hours of use. 2/30 days with no use.   Average use 398 minutes per day.   95%ile Leak 20.57 L/min.   CPAP 95% pressure 8.3 cm.   AHI 1.51 events per hour.        EPWORTH SLEEPINESS SCALE         12/17/2024    11:14 AM    [place] Sleepiness Scale ( THOMAS [place]  [id number]<br>ESS - [place]/English - Final version - 21 Nov 07 - [facility] [place].)   Sitting and reading Slight chance of dozing   Watching TV Slight chance of dozing   Sitting, inactive in a public place (e.g. a theatre or a meeting) Would never doze   As a passenger in a car for an hour without a break Would never doze   Lying down to rest in the afternoon when circumstances permit High chance of dozing   Sitting and talking to someone Would never doze   Sitting quietly after a lunch without alcohol Would never doze   In a car, while stopped for a few minutes in traffic Would never doze   [place] Score (MC) 5   [place] Score (Sleep) 5        Patient-reported       INSOMNIA SEVERITY INDEX (RAFAEL)          12/17/2024    11:09 AM   Insomnia Severity Index (RAFAEL)   Difficulty falling asleep 0    Difficulty staying asleep 1    Problems waking up too early 1    How SATISFIED/DISSATISFIED are you with your CURRENT sleep pattern? 2    How NOTICEABLE to others do you think your sleep problem is in terms of impairing the quality of your life? 0    How WORRIED/DISTRESSED are you about  your current sleep problem? 0    To what extent do you consider your sleep problem to INTERFERE with your daily functioning (e.g. daytime fatigue, mood, ability to function at work/daily chores, concentration, memory, mood, etc.) CURRENTLY? 1    RAFAEL Total Score 5        Patient-reported       Guidelines for Scoring/Interpretation:  Total score categories:  0-7 = No clinically significant insomnia   8-14 = Subthreshold insomnia   15-21 = Clinical insomnia (moderate severity)  22-28 = Clinical insomnia (severe)  Used via courtesy of www.[employer].gov with permission from Austen Chilel PhD., [facility]      Past medical/surgical history, family history, social history, medications and allergies were reviewed.        Problem List:  Patient Active Problem List    Diagnosis Date Noted    Obstructive sleep apnea 07/25/2024     Priority: Medium    Major depressive disorder, single episode in full remission (H) 12/03/2015     Priority: Medium    Type 2 diabetes, HbA1c goal < 7% (H) 07/16/2012     Priority: Medium    HYPERLIPIDEMIA LDL GOAL <100 10/31/2010     Priority: Medium    Pain in joint, ankle and foot 10/26/2007     Priority: Medium      Current Outpatient Medications   Medication Sig Dispense Refill    aspirin (ASA) 81 MG EC tablet Take 1 tablet (81 mg) by mouth daily 90 tablet 3    blood glucose (CONTOUR NEXT TEST) test strip TEST 4 TIMES DAILY 400 strip 3    blood glucose calibration (CONTOUR NEXT CONTROL LOW VI SOLN) Low solution Use to calibrate blood glucose monitor as directed. 1 Bottle 1    blood glucose calibration (CONTOUR NEXT CONTROL NORMAL VI SOLN) Normal solution Use to calibrate blood glucose monitor as needed as directed. 1 Bottle 1    blood glucose monitoring (CONTOUR NEXT MONITOR W/DEVICE KIT) meter device kit Use to test blood sugar 4 times daily or as directed. 1 kit 1    Continuous Glucose Sensor (FREESTYLE RADHA 2 SENSOR) MISC 1 each every 14 days. Use 1 sensor every 14 days. Use to  "read blood sugars per 's instructions. 6 each 5    empagliflozin (JARDIANCE) 25 MG TABS tablet Take 1 tablet (25 mg) by mouth daily 90 tablet 3    evolocumab (REPATHA) 140 MG/ML prefilled autoinjector Inject 1 mL (140 mg) subcutaneously every 14 days. 7 mL 3    ezetimibe (ZETIA) 10 MG tablet Take 1 tablet (10 mg) by mouth daily. 45 tablet 3    finasteride (PROSCAR) 5 MG tablet Take 1 tablet (5 mg) by mouth daily.      glimepiride (AMARYL) 4 MG tablet Take 1 tablet (4 mg) by mouth 2 times daily (before meals). 180 tablet 3    insulin pen needle (BD PEN NEEDLE MICRO U/F) 32G X 6 MM miscellaneous USE 1 PEN NEEDLE DAILY OR AS DIRECTED 100 each 2    KRILL OIL OR None Entered      liraglutide (VICTOZA PEN) 18 MG/3ML solution Inject 1.8 mg subcutaneously daily. 27 mL 3    lisinopril (ZESTRIL) 20 MG tablet Take 1 tablet (20 mg) by mouth daily. 90 tablet 3    metFORMIN (GLUCOPHAGE) 1000 MG tablet Take 1 tablet (1,000 mg) by mouth 2 times daily (with meals). 180 tablet 3    Microlet Lancets MISC Use to test blood sugar 4 times daily. 300 each 3    MULTIPLE VITAMINS CAPS   OR 1 capsule qd       ONETOUCH DELICA LANCETS 33G MISC TEST 4 TIMES DAILY 300 each 1    rosuvastatin (CRESTOR) 40 MG tablet Take 1 tablet (40 mg) by mouth daily. 90 tablet 3    tamsulosin (FLOMAX) 0.4 MG capsule Take 1 capsule (0.4 mg) by mouth daily.      zolpidem (AMBIEN) 10 MG tablet TAKE 1 TABLET BY MOUTH AT BEDTIME AS NEEDED FOR SLEEP 10 tablet 3     No current facility-administered medications for this visit.     Ht 1.803 m (5' 11\")   Wt 87.5 kg (193 lb)   BMI 26.92 kg/m        This note was written with the assistance of the Dragon voice-dictation technology software. The final document, although reviewed, may contain errors. For corrections, please contact the office.    "

## 2025-01-17 ENCOUNTER — TELEPHONE (OUTPATIENT)
Dept: OTHER | Facility: CLINIC | Age: 75
End: 2025-01-17
Payer: COMMERCIAL

## 2025-01-17 NOTE — TELEPHONE ENCOUNTER
Prior Authorization Retail Medication Request    Medication/Dose: Liraglutide  Diagnosis and ICD code (if different than what is on RX):  Type 2 diabetes, HbA1c goal < 7% (H) [E11.9]   New/renewal/insurance change PA/secondary ins. PA:  Previously Tried and Failed:  Jardiance  Rationale:  Type 2 diabetes, HbA1c goal < 7%     Insurance   Primary: [employer] [place]  Insurance ID:  [id number]      Clinic Information  Preferred routing pool for dept communication: [facility] RN germania BORJAS, RN    [facility]  Office: [phone]  Fax: [phone]

## 2025-01-21 NOTE — TELEPHONE ENCOUNTER
PRIOR AUTHORIZATION DENIED    Medication: Victoza - DENIED    Denial Date: 1/21/2025    Denial Rational: INSURANCE STATES PATIENT MUST TRY/FAIL 2 OTHER FORMULARY ALTERNATIVES - BYDUREON BCISE, MOUNJARO, OZEMPIC, TRULICITY.              Appeal Information:  IF PATIENT IS UNABLE TO TRY/FAIL ALTERNATIVE(S) PLEASE SUPPLY PA TEAM WITH A LETTER OF MEDICAL NECESSITY WITH CLINICAL REASON.

## 2025-01-21 NOTE — TELEPHONE ENCOUNTER
Retail Pharmacy Prior Authorization Team   Phone: [phone]    PA Initiation    Medication:   Insurance Company: [employer] [place] - Phone [phone] Fax [phone]  Pharmacy Filling the Rx: [facility] - [address]  Filling Pharmacy Phone: [phone]  Filling Pharmacy Fax: [phone]  Start Date: 1/21/2025

## 2025-02-03 ENCOUNTER — VIRTUAL VISIT (OUTPATIENT)
Dept: OTHER | Facility: CLINIC | Age: 75
End: 2025-02-03
Attending: INTERNAL MEDICINE
Payer: COMMERCIAL

## 2025-02-03 DIAGNOSIS — E78.5 HYPERLIPIDEMIA LDL GOAL <70: ICD-10-CM

## 2025-02-03 DIAGNOSIS — E11.9 TYPE 2 DIABETES, HBA1C GOAL < 7% (H): Primary | ICD-10-CM

## 2025-02-03 DIAGNOSIS — I10 ESSENTIAL HYPERTENSION WITH GOAL BLOOD PRESSURE LESS THAN 130/80: ICD-10-CM

## 2025-02-03 PROCEDURE — 98014 SYNCH AUDIO-ONLY EST MOD 30: CPT | Performed by: INTERNAL MEDICINE

## 2025-02-03 NOTE — PROGRESS NOTES
VASCULAR MEDICINE FOLLOW UP VISIT              A/P:         (E11.00) Type 2 diabetes mellitus with A1C goal < 7 %  Comment: Not at goal on metformin, Jardiance, Amaryl, and Victoza. We discussed insulin use , he elects to not start insulin presently. He would like to improve metabolic conditioning by undertaking a HIIT exercise routine. He is also using a personal CGM , which is reasonable due to poor glycemic control on four meds for DM2 with persistent suboptimal glycemic control. He was on Victoza for over a year but his insurance then denied it. RTC 05/19/25 for labs, see me two weeks later. He is told to stop Jardiance for any UTI sxs.  Plan: Continue Metformin (GLUCOPHAGE) 1000 MG tablet, empagliflozin (JARDIANCE) 25 MG TABS tablet, Amaryl. Substitute Ozempic for Victoza. Start at 0.25 mg weekly fro tow dose, then 0.5 mg weekly for three doses, then 1 mg weekly for four doses, then 2 mg weekly thereafter.  He is warned of side effects of GI distress, and is informed of the need to not take the drug should he develop abdominal pain on this.         Comprehensive metabolic panel, Albumin Random         Urine Quantitative with Creat Ratio, C-Reactive        Protein, High Sensitivity, Hemoglobin A1c, in 05/2025, RTC two weeks later.         empagliflozin (JARDIANCE) 25 MG TABS tablet,             (E78.5) Hyperlipidemia LDL goal <70  Comment: LDL-C and Lp(a) at goal; cardiac CRP and LDL-P elevated on Crestor 40 mg daily so we added Zetia 5 mg daily. He called c/o recurrent myalgias. He stopped Crestor on 11/17/2024 and myalgias went away after two days. He had muscle aching on Zocor as well as Pravachol as well.   Plan: Stay off Crestor. He has had myalgias on Crestor. Continue zetia 5 mg daily. I advised he could use Repatha or Leqvio. He chooses Repatha. Reason for a PCSK9 inhibitor is inability tolerate three separate statins with myalgias, and not being at goal on no statin.         Check Lipoprotein (a),  LipoFit by NMR, T3 Free, T4         free, TSH in 05/2025, RCT two weeks later.                             32 minutes total medical care on today's date.     MD location: office  Pt location: home     Phone call start: 16:15  Phone call end: 16:47              HPI: Dino Daniels is a 74 year old male with htn, HLD, DM2, obesity here for f/u of DM2. He had howard Rxd Victoza but his insurance refused to pay for it, instead wanting him to go on Victoza.exam as well as lab and med rview. His A1C has gone up. He has no hypoglycemic episodes, he has no h/o UTIs.      Review Of Systems  Skin: negative  Eyes: negative  Ears/Nose/Throat: negative  Respiratory: No shortness of breath, dyspnea on exertion, cough, or hemoptysis  Cardiovascular: negative  Gastrointestinal: negative  Genitourinary: negative  Musculoskeletal: negative  Neurologic: negative  Psychiatric: negative  Hematologic/Lymphatic/Immunologic: negative  Endocrine: negative          PAST MEDICAL HISTORY:                  Past Medical History:   Diagnosis Date    Arthritis     knees, left ankle and right thumb    Bladder stone     DEPRESSIVE DISORDER NEC 10/9/2006    resolved in 2009    Hypertension     Mumps     Other and unspecified hyperlipidemia     abstracted 7/17/02.    Type II or unspecified type diabetes mellitus without mention of complication, not stated as uncontrolled        PAST SURGICAL HISTORY:                  Past Surgical History:   Procedure Laterality Date    LASER HOLMIUM LITHOTRIPSY BLADDER  6/22/2012    Procedure: LASER HOLMIUM LITHOTRIPSY BLADDER;  Cystoscopy with Removal of Bladder Stone with Holmium Laser;  Surgeon: Chaz Cobb MD;  Location: [facility] OR    TESTICLE SURGERY      VASECTOMY      VASECTOMY      wisdom teeth[         CURRENT MEDICATIONS:                  Current Outpatient Medications   Medication Sig Dispense Refill    aspirin (ASA) 81 MG EC tablet Take 1 tablet (81 mg) by mouth daily 90 tablet 3    blood glucose  (CONTOUR NEXT TEST) test strip TEST 4 TIMES DAILY 400 strip 3    blood glucose calibration (CONTOUR NEXT CONTROL LOW VI SOLN) Low solution Use to calibrate blood glucose monitor as directed. 1 Bottle 1    blood glucose calibration (CONTOUR NEXT CONTROL NORMAL VI SOLN) Normal solution Use to calibrate blood glucose monitor as needed as directed. 1 Bottle 1    blood glucose monitoring (CONTOUR NEXT MONITOR W/DEVICE KIT) meter device kit Use to test blood sugar 4 times daily or as directed. 1 kit 1    Continuous Glucose Sensor (FREESTYLE RADHA 2 SENSOR) MISC 1 each every 14 days. Use 1 sensor every 14 days. Use to read blood sugars per 's instructions. 6 each 5    empagliflozin (JARDIANCE) 25 MG TABS tablet Take 1 tablet (25 mg) by mouth daily 90 tablet 3    evolocumab (REPATHA) 140 MG/ML prefilled autoinjector Inject 1 mL (140 mg) subcutaneously every 14 days. 7 mL 3    ezetimibe (ZETIA) 10 MG tablet Take 1 tablet (10 mg) by mouth daily. 45 tablet 3    finasteride (PROSCAR) 5 MG tablet Take 1 tablet (5 mg) by mouth daily.      glimepiride (AMARYL) 4 MG tablet Take 1 tablet (4 mg) by mouth 2 times daily (before meals). 180 tablet 3    insulin pen needle (BD PEN NEEDLE MICRO U/F) 32G X 6 MM miscellaneous USE 1 PEN NEEDLE DAILY OR AS DIRECTED 100 each 2    KRILL OIL OR None Entered      liraglutide (VICTOZA PEN) 18 MG/3ML solution Inject 1.8 mg subcutaneously daily. 27 mL 3    lisinopril (ZESTRIL) 20 MG tablet Take 1 tablet (20 mg) by mouth daily. 90 tablet 3    metFORMIN (GLUCOPHAGE) 1000 MG tablet Take 1 tablet (1,000 mg) by mouth 2 times daily (with meals). 180 tablet 3    Microlet Lancets MISC Use to test blood sugar 4 times daily. 300 each 3    MULTIPLE VITAMINS CAPS   OR 1 capsule qd       ONETOUCH DELICA LANCETS 33G MISC TEST 4 TIMES DAILY 300 each 1    tamsulosin (FLOMAX) 0.4 MG capsule Take 1 capsule (0.4 mg) by mouth daily.      zolpidem (AMBIEN) 10 MG tablet TAKE 1 TABLET BY MOUTH AT BEDTIME AS  NEEDED FOR SLEEP 10 tablet 3       ALLERGIES:                  Allergies   Allergen Reactions    No Known Drug Allergy        SOCIAL HISTORY:                  Social History     Socioeconomic History    Marital status:      Spouse name: Not on file    Number of children: Not on file    Years of education: Not on file    Highest education level: Not on file   Occupational History    Not on file   Tobacco Use    Smoking status: Never     Passive exposure: Never    Smokeless tobacco: Never   Vaping Use    Vaping status: Never Used   Substance and Sexual Activity    Alcohol use: Not Currently     Comment: rarely    Drug use: No    Sexual activity: Not on file   Other Topics Concern    Parent/sibling w/ CABG, MI or angioplasty before 65F 55M? Not Asked   Social History Narrative    Not on file     Social Drivers of Health     Financial Resource Strain: Low Risk  (6/29/2023)    [facility]    Overall Financial Resource Strain (CARDIA)     Difficulty of Paying Living Expenses: Not hard at all   Food Insecurity: Patient Declined (10/13/2024)    Received from [facility]    Hunger Vital Sign     Worried About Running Out of Food in the Last Year: Patient declined     Ran Out of Food in the Last Year: Patient declined   Transportation Needs: Patient Declined (10/13/2024)    Received from [facility]    PRAPARE - Transportation     Lack of Transportation (Medical): Patient declined     Lack of Transportation (Non-Medical): Patient declined   Physical Activity: Insufficiently Active (10/13/2024)    Received from [facility]    Exercise Vital Sign     Days of Exercise per Week: 2 days     Minutes of Exercise per Session: 50 min   Stress: Not on file   Social Connections: Not on file   Interpersonal Safety: Patient Declined (6/29/2023)    Received from [facility], [facility]    Humiliation, Afraid, Rape, and Kick questionnaire     Fear of Current or Ex-Partner: Patient declined     Emotionally  Abused: Patient declined     Physically Abused: Patient declined     Sexually Abused: Patient declined   Housing Stability: Patient Declined (10/13/2024)    Received from [facility]    Housing Stability     What is your living situation today?: Patient declined       FAMILY HISTORY:                   Family History   Problem Relation Age of Onset    Other Problems  () Father          age 56 leukemia    Cerebrovascular Disease Mother     Other Problems  () Brother         b,    Other Problems  () Sister         b, back problems    Other Problems  () Brother         b, back problems             Physical exam was not undertaken as this was a billable video visit.          All relevant labs and imaging reviewed by myself on today's date.

## 2025-02-03 NOTE — PROGRESS NOTES
Dino is a 74 year old who is being evaluated via a billable video visit.    How would you like to obtain your AVS? MyChart  If the video visit is dropped, the invitation should be resent by: Text to cell phone: [phone]  Will anyone else be joining your video visit? No      Kerry Dooley MA

## 2025-02-11 ENCOUNTER — MYC REFILL (OUTPATIENT)
Dept: OTHER | Facility: CLINIC | Age: 75
End: 2025-02-11
Payer: COMMERCIAL

## 2025-02-11 DIAGNOSIS — E78.5 HYPERLIPIDEMIA LDL GOAL <70: ICD-10-CM

## 2025-02-11 RX ORDER — EZETIMIBE 10 MG/1
10 TABLET ORAL DAILY
Qty: 90 TABLET | Refills: 1 | Status: SHIPPED | OUTPATIENT
Start: 2025-02-11

## 2025-02-11 NOTE — TELEPHONE ENCOUNTER
Last Written Prescription Date:  10/9/24  Last Fill Quantity: 45,  # refills: 3   Last office visit: 10/9/2024 ; last virtual visit: 2/3/2025 with prescribing provider:      Future Office Visit:      Requested Prescriptions   Pending Prescriptions Disp Refills    ezetimibe (ZETIA) 10 MG tablet 45 tablet 3     Sig: Take 1 tablet (10 mg) by mouth daily.       Antihyperlipidemic agents Passed - 2/11/2025 11:12 AM        Passed - LDL on file in the past 12 months        Passed - Medication is active on med list        Passed - Recent (12 mo) or future (90 days) visit within the authorizing provider's specialty     The patient must have completed an in-person or virtual visit within the past 12 months or has a future visit scheduled within the next 90 days with the authorizing provider s specialty.  Urgent care and e-visits do not qualify as an office visit for this protocol.          Passed - Patient is age 18 years or older             Prescription approved per [facility] Refill Protocol.    Diya Pink RN  [facility]

## 2025-03-22 ENCOUNTER — HEALTH MAINTENANCE LETTER (OUTPATIENT)
Age: 75
End: 2025-03-22

## 2025-03-31 DIAGNOSIS — E11.00 TYPE 2 DIABETES MELLITUS WITH HYPEROSMOLARITY WITHOUT COMA, WITHOUT LONG-TERM CURRENT USE OF INSULIN (H): ICD-10-CM

## 2025-03-31 DIAGNOSIS — E11.9 TYPE 2 DIABETES, HBA1C GOAL < 7% (H): ICD-10-CM

## 2025-03-31 RX ORDER — SEMAGLUTIDE 0.68 MG/ML
INJECTION, SOLUTION SUBCUTANEOUS
Qty: 3 ML | Refills: 9 | Status: SHIPPED | OUTPATIENT
Start: 2025-03-31

## 2025-03-31 RX ORDER — EMPAGLIFLOZIN 25 MG/1
25 TABLET, FILM COATED ORAL DAILY
Qty: 90 TABLET | Refills: 3 | Status: SHIPPED | OUTPATIENT
Start: 2025-03-31

## 2025-03-31 NOTE — TELEPHONE ENCOUNTER
Unable to fill per [facility] protocol.  Medication and pharmacy loaded.    Nancy BORJAS, RN    [facility]  Office: [phone]  Fax: [phone]

## 2025-03-31 NOTE — TELEPHONE ENCOUNTER
Unable to fill per [facility] protocol.  Medication and pharmacy loaded.    Nancy BORJAS, RN    [facility]  Office: [phone]  Fax: [phone]       Detail Level: Detailed Quality 110: Preventive Care And Screening: Influenza Immunization: Influenza Immunization Administered during Influenza season Quality 226: Preventive Care And Screening: Tobacco Use: Screening And Cessation Intervention: Patient screened for tobacco use and is an ex/non-smoker Quality 111:Pneumonia Vaccination Status For Older Adults: Patient did not receive any pneumococcal conjugate or polysaccharide vaccine on or after their 60th birthday and before the end of the measurement period

## 2025-04-12 ENCOUNTER — MYC REFILL (OUTPATIENT)
Dept: OTHER | Facility: CLINIC | Age: 75
End: 2025-04-12
Payer: COMMERCIAL

## 2025-04-12 DIAGNOSIS — E11.00 TYPE 2 DIABETES MELLITUS WITH HYPEROSMOLARITY WITHOUT COMA, WITHOUT LONG-TERM CURRENT USE OF INSULIN (H): ICD-10-CM

## 2025-04-14 RX ORDER — PEN NEEDLE, DIABETIC 32 GX 1/4"
NEEDLE, DISPOSABLE MISCELLANEOUS
Qty: 100 EACH | Refills: 2 | Status: SHIPPED | OUTPATIENT
Start: 2025-04-14

## 2025-04-14 NOTE — TELEPHONE ENCOUNTER
Unable to fill per [facility] protocol.  Medication and pharmacy loaded.    Nancy BORJAS, RN    [facility]  Office: [phone]  Fax: [phone]

## 2025-04-24 DIAGNOSIS — E11.9 TYPE 2 DIABETES, HBA1C GOAL < 7% (H): ICD-10-CM

## 2025-04-25 NOTE — TELEPHONE ENCOUNTER
Unable to fill per [facility] protocol.  Medication and pharmacy loaded.    Nancy BORJAS, RN    [facility]  Office: [phone]  Fax: [phone]

## 2025-04-28 RX ORDER — GLIMEPIRIDE 4 MG/1
TABLET ORAL
Qty: 180 TABLET | Refills: 3 | Status: SHIPPED | OUTPATIENT
Start: 2025-04-28

## 2025-05-15 ENCOUNTER — MYC REFILL (OUTPATIENT)
Dept: OTHER | Facility: CLINIC | Age: 75
End: 2025-05-15
Payer: COMMERCIAL

## 2025-05-15 DIAGNOSIS — E11.9 TYPE 2 DIABETES, HBA1C GOAL < 7% (H): ICD-10-CM

## 2025-05-19 RX ORDER — SEMAGLUTIDE 0.68 MG/ML
0.5 INJECTION, SOLUTION SUBCUTANEOUS
Qty: 3 ML | Refills: 1 | Status: SHIPPED | OUTPATIENT
Start: 2025-05-19

## 2025-05-19 NOTE — TELEPHONE ENCOUNTER
Please see [employer] message below.  0.5 mg dose loaded below- please sign if appropriate or change to correct dose    Nancy BORJAS RN    [facility]  Office: [phone]  Fax: [phone]

## 2025-05-21 ENCOUNTER — LAB (OUTPATIENT)
Dept: LAB | Facility: CLINIC | Age: 75
End: 2025-05-21
Payer: COMMERCIAL

## 2025-05-21 DIAGNOSIS — E11.9 TYPE 2 DIABETES, HBA1C GOAL < 7% (H): ICD-10-CM

## 2025-05-21 DIAGNOSIS — E78.5 HYPERLIPIDEMIA LDL GOAL <70: ICD-10-CM

## 2025-05-21 LAB
ALBUMIN SERPL BCG-MCNC: 4.5 G/DL (ref 3.5–5.2)
ALP SERPL-CCNC: 41 U/L (ref 40–150)
ALT SERPL W P-5'-P-CCNC: 32 U/L (ref 0–70)
ANION GAP SERPL CALCULATED.3IONS-SCNC: 11 MMOL/L (ref 7–15)
AST SERPL W P-5'-P-CCNC: 28 U/L (ref 0–45)
BILIRUB SERPL-MCNC: 0.6 MG/DL
BUN SERPL-MCNC: 20.9 MG/DL (ref 8–23)
CALCIUM SERPL-MCNC: 9.9 MG/DL (ref 8.8–10.4)
CHLORIDE SERPL-SCNC: 103 MMOL/L (ref 98–107)
CREAT SERPL-MCNC: 1.22 MG/DL (ref 0.67–1.17)
CRP SERPL HS-MCNC: 0.28 MG/L
EGFRCR SERPLBLD CKD-EPI 2021: 62 ML/MIN/1.73M2
EST. AVERAGE GLUCOSE BLD GHB EST-MCNC: 137 MG/DL
GLUCOSE SERPL-MCNC: 143 MG/DL (ref 70–99)
HBA1C MFR BLD: 6.4 % (ref 0–5.6)
HCO3 SERPL-SCNC: 26 MMOL/L (ref 22–29)
POTASSIUM SERPL-SCNC: 4.6 MMOL/L (ref 3.4–5.3)
PROT SERPL-MCNC: 7.2 G/DL (ref 6.4–8.3)
SODIUM SERPL-SCNC: 140 MMOL/L (ref 135–145)
T3FREE SERPL-MCNC: 2.8 PG/ML (ref 2–4.4)
T4 FREE SERPL-MCNC: 1.12 NG/DL (ref 0.9–1.7)
TSH SERPL DL<=0.005 MIU/L-ACNC: 2.4 UIU/ML (ref 0.3–4.2)

## 2025-05-21 PROCEDURE — 84443 ASSAY THYROID STIM HORMONE: CPT

## 2025-05-21 PROCEDURE — 83695 ASSAY OF LIPOPROTEIN(A): CPT

## 2025-05-21 PROCEDURE — 80061 LIPID PANEL: CPT | Mod: 59

## 2025-05-21 PROCEDURE — 86141 C-REACTIVE PROTEIN HS: CPT

## 2025-05-21 PROCEDURE — 83704 LIPOPROTEIN BLD QUAN PART: CPT

## 2025-05-21 PROCEDURE — 83036 HEMOGLOBIN GLYCOSYLATED A1C: CPT

## 2025-05-21 PROCEDURE — 36415 COLL VENOUS BLD VENIPUNCTURE: CPT

## 2025-05-21 PROCEDURE — 99000 SPECIMEN HANDLING OFFICE-LAB: CPT

## 2025-05-21 PROCEDURE — 84481 FREE ASSAY (FT-3): CPT

## 2025-05-21 PROCEDURE — 84439 ASSAY OF FREE THYROXINE: CPT

## 2025-05-21 PROCEDURE — 80053 COMPREHEN METABOLIC PANEL: CPT

## 2025-05-22 ENCOUNTER — TELEPHONE (OUTPATIENT)
Dept: PHARMACY | Facility: OTHER | Age: 75
End: 2025-05-22
Payer: COMMERCIAL

## 2025-05-22 LAB — APO A-I SERPL-MCNC: 6 MG/DL

## 2025-05-22 NOTE — TELEPHONE ENCOUNTER
SIMON Recruitment: [facility]  insurance     Referral outreach attempt #1 on May 22, 2025      Outcome: call attempted, could not leave voicemail  and MyChart message sent    SIMON Dominique

## 2025-05-25 LAB
CHOLEST SERPL-MCNC: 105 MG/DL
HDL SERPL QN: 8 NM
HDL SERPL-SCNC: 32.8 UMOL/L
HDLC SERPL-MCNC: 37 MG/DL
HLD.LARGE SERPL-SCNC: <2.8 UMOL/L
LDL SERPL QN: 20.7 NM
LDL SERPL-SCNC: 849 NMOL/L
LDL SMALL SERPL-SCNC: 392 NMOL/L
LDLC SERPL CALC-MCNC: 32 MG/DL
PATHOLOGY STUDY: ABNORMAL
TRIGL SERPL-MCNC: 178 MG/DL
VLDL LARGE SERPL-SCNC: <1.5 NMOL/L
VLDL SERPL QN: 46.8 NM

## 2025-05-27 ENCOUNTER — RESULTS FOLLOW-UP (OUTPATIENT)
Dept: OTHER | Facility: CLINIC | Age: 75
End: 2025-05-27

## 2025-05-28 ENCOUNTER — TRANSFERRED RECORDS (OUTPATIENT)
Dept: HEALTH INFORMATION MANAGEMENT | Facility: CLINIC | Age: 75
End: 2025-05-28
Payer: COMMERCIAL

## 2025-06-04 ENCOUNTER — OFFICE VISIT (OUTPATIENT)
Dept: OTHER | Facility: CLINIC | Age: 75
End: 2025-06-04
Attending: INTERNAL MEDICINE
Payer: MEDICARE

## 2025-06-04 VITALS
BODY MASS INDEX: 27.09 KG/M2 | WEIGHT: 194.2 LBS | HEART RATE: 90 BPM | DIASTOLIC BLOOD PRESSURE: 73 MMHG | SYSTOLIC BLOOD PRESSURE: 108 MMHG | OXYGEN SATURATION: 95 %

## 2025-06-04 DIAGNOSIS — E11.59 TYPE 2 DIABETES MELLITUS WITH OTHER CIRCULATORY COMPLICATIONS (H): ICD-10-CM

## 2025-06-04 DIAGNOSIS — M79.604 PAIN IN BOTH LOWER EXTREMITIES: ICD-10-CM

## 2025-06-04 DIAGNOSIS — R33.9 URINARY RETENTION: ICD-10-CM

## 2025-06-04 DIAGNOSIS — Z00.00 MEDICARE ANNUAL WELLNESS VISIT, SUBSEQUENT: Primary | ICD-10-CM

## 2025-06-04 DIAGNOSIS — F51.11 PRIMARY HYPERSOMNIA: ICD-10-CM

## 2025-06-04 DIAGNOSIS — I10 ESSENTIAL HYPERTENSION WITH GOAL BLOOD PRESSURE LESS THAN 130/80: ICD-10-CM

## 2025-06-04 DIAGNOSIS — M79.605 PAIN IN BOTH LOWER EXTREMITIES: ICD-10-CM

## 2025-06-04 DIAGNOSIS — E78.5 HYPERLIPIDEMIA LDL GOAL <70: ICD-10-CM

## 2025-06-04 DIAGNOSIS — Z12.5 SCREENING FOR PROSTATE CANCER: ICD-10-CM

## 2025-06-04 DIAGNOSIS — E11.9 TYPE 2 DIABETES, HBA1C GOAL < 7% (H): ICD-10-CM

## 2025-06-04 PROCEDURE — G0463 HOSPITAL OUTPT CLINIC VISIT: HCPCS | Performed by: INTERNAL MEDICINE

## 2025-06-04 RX ORDER — EZETIMIBE 10 MG/1
10 TABLET ORAL DAILY
Qty: 90 TABLET | Refills: 1 | Status: SHIPPED | OUTPATIENT
Start: 2025-06-04

## 2025-06-04 RX ORDER — ZOLPIDEM TARTRATE 10 MG/1
TABLET ORAL
Qty: 10 TABLET | Refills: 3 | Status: SHIPPED | OUTPATIENT
Start: 2025-06-04

## 2025-06-04 RX ORDER — FINASTERIDE 5 MG/1
5 TABLET, FILM COATED ORAL DAILY
Qty: 90 TABLET | Refills: 3 | Status: SHIPPED | OUTPATIENT
Start: 2025-06-04

## 2025-06-04 RX ORDER — ASCORBIC ACID 1000 MG
10 TABLET ORAL
COMMUNITY

## 2025-06-04 RX ORDER — TAMSULOSIN HYDROCHLORIDE 0.4 MG/1
0.4 CAPSULE ORAL DAILY
Qty: 90 CAPSULE | Refills: 3 | Status: SHIPPED | OUTPATIENT
Start: 2025-06-04

## 2025-06-04 RX ORDER — LISINOPRIL 20 MG/1
20 TABLET ORAL DAILY
Qty: 90 TABLET | Refills: 3 | Status: SHIPPED | OUTPATIENT
Start: 2025-06-04

## 2025-06-04 RX ORDER — GLIMEPIRIDE 4 MG/1
4 TABLET ORAL
Qty: 180 TABLET | Refills: 3 | Status: SHIPPED | OUTPATIENT
Start: 2025-06-04

## 2025-06-04 NOTE — PROGRESS NOTES
VASCULAR MEDICINE FOLLOW UP VISIT            Assessment & Plan       (Z00.00) Medicare annual wellness visit, subsequent  (primary encounter diagnosis)  Comment: Doing well  Plan: RTC one year for annual physical.    (E11.9) Type 2 diabetes, HbA1c goal < 7% (H)  Comment: At goal, continue meds, check labs in six months.  Plan: glimepiride (AMARYL) 4 MG tablet, empagliflozin        (JARDIANCE) 25 MG TABS tablet, metFORMIN         (GLUCOPHAGE) 1000 MG tablet, Semaglutide, 2         MG/DOSE, (OZEMPIC) 8 MG/3ML pen, Hemoglobin         A1c, Comprehensive metabolic panel,         OFFICE/OUTPT VISIT,EST,LEVL III            (E78.5) Hyperlipidemia LDL goal <70  Comment: At goal, continue meds, check labs in six months.  Plan: evolocumab (REPATHA) 140 MG/ML prefilled         autoinjector, ezetimibe (ZETIA) 10 MG tablet,         C-Reactive Protein, High Sensitivity, LipoFit         by NMR, Lipoprotein (a), OFFICE/OUTPT         VISIT,EST,LEVL III            (I10) Essential hypertension with goal blood pressure less than 130/80  Comment: At goal, continue meds, check labs in six months.  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   Plan: zolpidem (AMBIEN) 10 MG tablet, OFFICE/OUTPT         VISIT,EST,LEVL III            (R33.9) Urinary retention  Comment: Needs the below.   Plan: finasteride (PROSCAR) 5 MG tablet, tamsulosin         (FLOMAX) 0.4 MG capsule, OFFICE/OUTPT         VISIT,EST,LEVL III            (Z12.5) Screening for prostate cancer  Comment: Needs the below.   Plan: Prostate Specific Antigen Screen, OFFICE/OUTPT         VISIT,EST,LEVL III            (M79.604,  M79.605) Pain in both lower extremities  Comment: Needs the below.   Plan: US ZOEY Doppler with Exercise Bilateral            The longitudinal care of plan for Dino  was addressed during this visit. Due to added complexity of care, we will continue to support  Dino BASS Brentchandrakant  and the subsequent management of these conditions and with ongoing continuity of care for these conditions.     Greater than one half the 32 minutes not spent on preventive care during this visit was spent providing medical care  regarding item(s) # 2 onward as delineated above.      Follow Up/Next Steps    No follow-ups on file.  Patient was scheduled for a follow up visit PCP to discuss htn, HLD, DM2, annual physical    Counseling and Education  Reviewed preventive health counseling, as reflected in patient instructions        Appropriate preventive services were discussed with this patient, including applicable screening as appropriate for cardiovascular disease, diabetes, osteopenia/osteoporosis, and glaucoma.  As appropriate for age/gender, discussed screening for colorectal cancer, prostate cancer, breast cancer, and cervical cancer. Checklist reviewing preventive services available has been given to the patient.    Reviewed patients plan of care and provided an AVS. The Basic Care Plan (routine screening as documented in Health Maintenance) for Dino meets the Care Plan requirement. This Care Plan has been established and reviewed with the Patient.    Visit Provider: Abel Fernandez MD  Supervising Provider: Abel Fernandez MD  [facility] LAM Sun is a 74 year old who is being seen for an Annual Wellness Visit  accompanied by himself.    HPI  Do you feel safe in your environment? Yes    Have you ever done Advance Care Planning? (For example, a Health Directive, POLST, or a discussion with a medical provider or your loved ones about your wishes): Yes, advance care planning is on file.    Fall risk  Fallen 2 or more times in the past year?: No  Cognitive Screening   1) Repeat 3 items (Leader, Season, Table)    2) Clock draw:   3) 3 item recall: Recalls 3 objects  Results: 3 items recalled: COGNITIVE IMPAIRMENT LESS  "LIKELY    Mini-CogTM Copyright S Bernardo. Licensed by the author for use in [facility]; reprinted with permission (ryan@.[facility]). All rights reserved.      Do you have sleep apnea, excessive snoring or daytime drowsiness? : yes uses CPAP    Reviewed and updated as needed this visit by clinical staff                  Social History     Tobacco Use    Smoking status: Never     Passive exposure: Never    Smokeless tobacco: Never   Substance Use Topics    Alcohol use: Not Currently     Comment: rarely       Current providers sharing in care for this patient include:   Patient Care Team:  Abel Fernandez MD as PCP - General  Abel Fernandez MD as Assigned Heart and Vascular Provider  Will Pearl APRN CNP as Assigned Sleep Provider    The following health maintenance items are reviewed in Epic and correct as of today:  Health Maintenance Due   Topic Date Due    DEPRESSION ACTION PLAN  Never done    ZOSTER VACCINE (1 of 2) Never done    DTAP/TDAP/TD VACCINE (1 - Tdap) 01/09/2007    RSV VACCINE (1 - Risk 60-74 years 1-dose series) Never done    DIABETIC FOOT EXAM  05/04/2017    PHQ-9  06/20/2017    PNEUMOCOCCAL VACCINE 50+ YEARS (2 of 2 - PCV) 01/01/2019    LIPID  12/12/2020    FALL RISK ASSESSMENT  11/10/2022    COVID-19 VACCINE (1 - 2024-25 season) Never done    Medicare Annual [place] Pharmacist Visit (once per calendar year)  Never done               Objective    /73 (BP Location: Right arm, Patient Position: Chair, Cuff Size: Adult Regular)   Pulse 90   Wt 194 lb 3.2 oz (88.1 kg)   SpO2 95%   BMI 27.09 kg/m   Estimated body mass index is 26.92 kg/m  as calculated from the following:    Height as of 12/17/24: 5' 11\" (1.803 m).    Weight as of 12/17/24: 193 lb (87.5 kg).  Physical Exam  Patient appears comfortable and in no acute distress.        Identified Health Risks: CRISTAL, overweight , DM2                      "

## 2025-06-04 NOTE — PROGRESS NOTES
[facility]        Patient is here for a  follow up.    Pt is currently taking Aspirin.    /73 (BP Location: Right arm, Patient Position: Chair, Cuff Size: Adult Regular)   Pulse 90   Wt 194 lb 3.2 oz (88.1 kg)   SpO2 95%   BMI 27.09 kg/m      The provider has been notified that the patient has no concerns.     Questions patient would like addressed today are: N/A.    Refills are needed: N/A    Has homecare services and agency name:  Kiera Dooley MA

## 2025-06-10 DIAGNOSIS — E11.9 TYPE 2 DIABETES, HBA1C GOAL < 7% (H): ICD-10-CM

## 2025-06-10 RX ORDER — SEMAGLUTIDE 0.68 MG/ML
0.5 INJECTION, SOLUTION SUBCUTANEOUS
Qty: 3 ML | Refills: 1 | Status: SHIPPED | OUTPATIENT
Start: 2025-06-10

## 2025-06-11 ENCOUNTER — TELEPHONE (OUTPATIENT)
Dept: OTHER | Facility: CLINIC | Age: 75
End: 2025-06-11
Payer: COMMERCIAL

## 2025-06-11 DIAGNOSIS — M79.605 PAIN IN BOTH LOWER EXTREMITIES: Primary | ICD-10-CM

## 2025-06-11 DIAGNOSIS — M79.604 PAIN IN BOTH LOWER EXTREMITIES: Primary | ICD-10-CM

## 2025-06-11 NOTE — TELEPHONE ENCOUNTER
Routing to scheduling to coordinate the following:    US ZOEY Doppler with Exercise bilateral  In clinic appt with Dr. Fernandez same day  Please schedule this in approximately 3-4 weeks    Appt note: In clinic follow up to 06/04/25 appt with Dr Rebecca HEADLEYN, RN    [facility]  Vascular [facility]  Office: [phone]  Fax: [phone]

## 2025-06-12 ENCOUNTER — TELEPHONE (OUTPATIENT)
Dept: OTHER | Facility: CLINIC | Age: 75
End: 2025-06-12
Payer: COMMERCIAL

## 2025-06-12 NOTE — TELEPHONE ENCOUNTER
[facility] VASCULAR [facility]    Who is the name of the provider?:  DENNIS HOROWITZ   What is the location you see this provider at/preferred location?: [place]  Person calling / Facility: Britany Pereira with [employer] re: Dino Daniels  Phone number:  [phone]   Nurse call back needed:  y     Reason for call:    A message was left on the [facility] phone line from Britany Pereira with [facility] [employer].    Patient has a prescription for Ozempic 2 mg.      The last fill was Ozempic .5 mg    The next regular dose should be titrated to 1 mg.     Please call back to clarify.    Reference # [id number]    Pharmacy location:     [facility] - [facility], [place] - [address] [place] [address] PHARMACY - [address] PHARMACY [place] - [address]  Outside Imaging: n/a   Can we leave a detailed message on this number?  YES     6/12/2025, 3:20 PM

## 2025-06-12 NOTE — TELEPHONE ENCOUNTER
Spoke to patient he is scheduled longer then requested time frame due to his schedule and doctor gould schedule.

## 2025-06-13 NOTE — TELEPHONE ENCOUNTER
Writer called patient and clarified dose. Pt reports he is currently taking 0.5mg dose.    Per chart review, pt has 2mg, 1mg, and 0.5mg prescriptions.    Routing to Dr Fernandez for clarification on correct dose to prescribe, RN unable to discontinue medications. Dr Fernandez needs to discontinue doses that are incorrect.    Sosa BORJAS, RN    [facility]  Office: [phone]  Fax: [phone]

## 2025-06-18 ENCOUNTER — TELEPHONE (OUTPATIENT)
Dept: PHARMACY | Facility: OTHER | Age: 75
End: 2025-06-18
Payer: COMMERCIAL

## 2025-06-18 NOTE — TELEPHONE ENCOUNTER
SIMON Recruitment: [facility]  insurance     Referral outreach attempt #2 on June 18, 2025      Outcome: left voicemail- Call back number [phone]    SIMON Dominique

## 2025-07-01 ENCOUNTER — MYC MEDICAL ADVICE (OUTPATIENT)
Dept: OTHER | Facility: CLINIC | Age: 75
End: 2025-07-01
Payer: COMMERCIAL

## 2025-07-01 DIAGNOSIS — E11.9 TYPE 2 DIABETES, HBA1C GOAL < 7% (H): ICD-10-CM

## 2025-07-02 RX ORDER — SEMAGLUTIDE 0.68 MG/ML
0.5 INJECTION, SOLUTION SUBCUTANEOUS
Qty: 3 ML | Refills: 1 | Status: SHIPPED | OUTPATIENT
Start: 2025-07-02

## 2025-07-02 NOTE — TELEPHONE ENCOUNTER
Writer called patient to discuss this further with no answer.      Nancy BORJAS, RN    [facility]  Vascular [facility]  Office: [phone]  Fax: [phone]

## 2025-07-02 NOTE — TELEPHONE ENCOUNTER
I really can not sort out why this patietn is often on different doses of Ozempic than I prescribed. Originally I prescribed a dose uptitration (0.25 mg weekly for a month, then 0.5 mg weekly for a month, then 1 mg weekly for a month, then 2 mg weekly thereafter). I don't know why he did not follow that.     If he is on 0.5 mg weekly, then I will prescribe that until I see him next. I furthermore do not known why his insurance company has a say on what I prescribe. Should he wish to get 0.5 mg weekly , please let me know an I will send in that Rx.    I suggest you call him rather than communicate electronically.

## 2025-07-02 NOTE — TELEPHONE ENCOUNTER
Please see mychart below.  LOV note states pt is on Ozempic 2mg, pt previously stated he is taking 0.5mg and not 2mg and message below saying insurance approving 0.25mg. What dose should pt be on?    Nancy BORJAS, RN    [facility]  Vascular [facility]  Office: [phone]  Fax: [phone]

## 2025-07-15 NOTE — PROGRESS NOTES
VASCULAR MEDICINE FOLLOW UP VISIT                                                                             Assessment & Plan      (M79.604,  M79.605) Pain in both lower extremities  Comment: US ZOEY Doppler with Exercise Bilateral were normal. His pain sounds orthopedic in nature.  Plan: He lives closer to the [facility]. He was advised to seek a referral to ortho foot and ankle at [place].             (E11.9) Type 2 diabetes, HbA1c goal < 7% (H)  Comment: At goal, continue meds, check labs in 12/2025.  Plan: glimepiride (AMARYL) 4 MG tablet, empagliflozin        (JARDIANCE) 25 MG TABS tablet, metFORMIN         (GLUCOPHAGE) 1000 MG tablet, Semaglutide, 2         MG/DOSE, (OZEMPIC) 8 MG/3ML pen, Hemoglobin         A1c, Comprehensive metabolic panel             (E78.5) Hyperlipidemia LDL goal <70  Comment: At goal, continue meds, check labs in 12/2025.  Plan: evolocumab (REPATHA) 140 MG/ML prefilled         autoinjector, ezetimibe (ZETIA) 10 MG tablet,         C-Reactive Protein, High Sensitivity, LipoFit         by NMR, Lipoprotein (a)             (I10) Essential hypertension with goal blood pressure less than 130/80  Comment: At goal, continue meds, check labs in 12/2025.  Plan: lisinopril (ZESTRIL) 20 MG tablet                           The longitudinal care of julia for Dino  was addressed during this visit. Due to added complexity of care, we will continue to support Dino Daniels  and the subsequent management of these conditions and with ongoing continuity of care for these conditions.      35 minutes total medical care on today's date.     HPI    Dino Daniels is a 74 year old hypertensive hyperlipidemic type 2 diabetic with BLE pain after walking brief distances. He does not have pain immediately upon changing positions. He has peripheral neuropathy but sxs are not paresthesiae in nature. It is worse in the left were he has ankle OA. It is made worse particularly with weight bearing.                     Social History            Tobacco Use    Smoking status: Never       Passive exposure: Never    Smokeless tobacco: Never   Substance Use Topics    Alcohol use: Not Currently       Comment: rarely         Current providers sharing in care for this patient include:   Patient Care Team:  Abel Fernandez MD as PCP - General  Abel Fernandez MD as Assigned Heart and Vascular Provider  Will Pearl APRN CNP as Assigned Sleep Provider     The following health maintenance items are reviewed in Epic and correct as of today:       Health Maintenance Due   Topic Date Due    DEPRESSION ACTION PLAN  Never done    ZOSTER VACCINE (1 of 2) Never done    DTAP/TDAP/TD VACCINE (1 - Tdap) 01/09/2007    RSV VACCINE (1 - Risk 60-74 years 1-dose series) Never done    DIABETIC FOOT EXAM  05/04/2017    PHQ-9  06/20/2017    PNEUMOCOCCAL VACCINE 50+ YEARS (2 of 2 - PCV) 01/01/2019    LIPID  12/12/2020    FALL RISK ASSESSMENT  11/10/2022    COVID-19 VACCINE (1 - 2024-25 season) Never done    Medicare Annual MTM Pharmacist Visit (once per calendar year)  Never done           Review Of Systems  Skin: negative  Eyes: negative  Ears/Nose/Throat: negative  Respiratory: No shortness of breath, dyspnea on exertion, cough, or hemoptysis  Cardiovascular: negative  Gastrointestinal: negative  Genitourinary: negative  Musculoskeletal: as above  Neurologic: negative  Psychiatric: negative  Hematologic/Lymphatic/Immunologic: negative  Endocrine: negative                        Physical exam was not undertaken.       All relevant labs and imaging reviewed by myself on today's date.

## 2025-07-16 ENCOUNTER — HOSPITAL ENCOUNTER (OUTPATIENT)
Dept: ULTRASOUND IMAGING | Facility: CLINIC | Age: 75
Discharge: HOME | End: 2025-07-16
Attending: INTERNAL MEDICINE
Payer: MEDICARE

## 2025-07-16 ENCOUNTER — OFFICE VISIT (OUTPATIENT)
Dept: OTHER | Facility: CLINIC | Age: 75
End: 2025-07-16
Attending: INTERNAL MEDICINE
Payer: MEDICARE

## 2025-07-16 ENCOUNTER — MYC MEDICAL ADVICE (OUTPATIENT)
Dept: OTHER | Facility: CLINIC | Age: 75
End: 2025-07-16
Payer: COMMERCIAL

## 2025-07-16 VITALS
HEART RATE: 72 BPM | BODY MASS INDEX: 27.06 KG/M2 | SYSTOLIC BLOOD PRESSURE: 114 MMHG | OXYGEN SATURATION: 97 % | DIASTOLIC BLOOD PRESSURE: 79 MMHG | WEIGHT: 194 LBS

## 2025-07-16 DIAGNOSIS — M79.604 PAIN IN BOTH LOWER EXTREMITIES: ICD-10-CM

## 2025-07-16 DIAGNOSIS — M79.605 PAIN IN BOTH LOWER EXTREMITIES: ICD-10-CM

## 2025-07-16 DIAGNOSIS — E11.59 TYPE 2 DIABETES MELLITUS WITH OTHER CIRCULATORY COMPLICATIONS (H): ICD-10-CM

## 2025-07-16 DIAGNOSIS — E11.9 TYPE 2 DIABETES, HBA1C GOAL < 7% (H): ICD-10-CM

## 2025-07-16 PROBLEM — H90.3 BILATERAL SENSORINEURAL HEARING LOSS: Status: ACTIVE | Noted: 2025-07-16

## 2025-07-16 PROBLEM — N40.1 LOWER URINARY TRACT SYMPTOMS DUE TO BENIGN PROSTATIC HYPERPLASIA: Status: ACTIVE | Noted: 2023-10-20

## 2025-07-16 PROBLEM — R35.0 INCREASED FREQUENCY OF URINATION: Status: ACTIVE | Noted: 2018-05-31

## 2025-07-16 PROBLEM — E11.65 TYPE 2 DIABETES MELLITUS WITH HYPERGLYCEMIA (H): Status: ACTIVE | Noted: 2023-10-20

## 2025-07-16 PROBLEM — N52.9 PRIMARY ERECTILE DYSFUNCTION: Status: ACTIVE | Noted: 2022-01-01

## 2025-07-16 PROBLEM — R97.20 HIGH PROSTATE SPECIFIC ANTIGEN (PSA): Status: ACTIVE | Noted: 2023-10-20

## 2025-07-16 PROBLEM — I10 PRIMARY HYPERTENSION: Status: ACTIVE | Noted: 2023-04-26

## 2025-07-16 PROCEDURE — G0463 HOSPITAL OUTPT CLINIC VISIT: HCPCS | Performed by: INTERNAL MEDICINE

## 2025-07-16 PROCEDURE — 93924 LWR XTR VASC STDY BILAT: CPT

## 2025-07-16 PROCEDURE — 99214 OFFICE O/P EST MOD 30 MIN: CPT | Performed by: INTERNAL MEDICINE

## 2025-07-16 PROCEDURE — G2211 COMPLEX E/M VISIT ADD ON: HCPCS | Performed by: INTERNAL MEDICINE

## 2025-07-16 PROCEDURE — 3078F DIAST BP <80 MM HG: CPT | Performed by: INTERNAL MEDICINE

## 2025-07-16 PROCEDURE — 3074F SYST BP LT 130 MM HG: CPT | Performed by: INTERNAL MEDICINE

## 2025-07-16 NOTE — TELEPHONE ENCOUNTER
Prescription approved per [facility] Refill Protocol.    Zulema Levine RN  [facility]  Office: [phone]  Fax: [phone]

## 2025-07-16 NOTE — PROGRESS NOTES
[facility]        Patient is here for a  follow up.    Pt is currently taking Aspirin.    /79 (BP Location: Right arm, Patient Position: Chair, Cuff Size: Adult Regular)   Pulse 72   Wt 194 lb (88 kg)   SpO2 97%   BMI 27.06 kg/m      The provider has been notified that the patient has no concerns.     Questions patient would like addressed today are: N/A.    Refills are needed: N/A    Has homecare services and agency name:  Kiera Dooley MA

## 2025-07-23 ENCOUNTER — TELEPHONE (OUTPATIENT)
Dept: OTHER | Facility: CLINIC | Age: 75
End: 2025-07-23
Payer: COMMERCIAL

## 2025-07-23 NOTE — TELEPHONE ENCOUNTER
[facility]ASCULAR HE[facility] CENTER    Who is the name of the provider?:  DENNIS HOROWITZ   What is the location you see this provider at/preferred location?: [place]  Person calling / Facility: [employer]  Phone number: [phone]  Nurse call back needed:  yes     Reason for call:  [facility] called regarding patients Ozempic prescription and asked to speak to a nurse. Please use reference number [id number]    Pharmacy location:     [facility] - [address] PHARMACY - [address] PHARMACY [employer] [place] [address] [place]  Outside Imaging: n/a   Can we leave a detailed message on this number?  YES     7/23/2025, 11:33 AM

## 2025-07-24 NOTE — TELEPHONE ENCOUNTER
Returned call to [facility] and spoke with pharmacist Sharri who wanted to clarify current dose of Ozempic. Per chart review patient has RX for 0.5 mg Ozempic which was discussed with Sharri.    Nancy BORJAS, RN    [facility]  Office: [phone]  Fax: [phone]